# Patient Record
Sex: MALE | Race: WHITE | NOT HISPANIC OR LATINO | Employment: OTHER | URBAN - METROPOLITAN AREA
[De-identification: names, ages, dates, MRNs, and addresses within clinical notes are randomized per-mention and may not be internally consistent; named-entity substitution may affect disease eponyms.]

---

## 2017-03-28 LAB — HBA1C MFR BLD HPLC: 6.2 %

## 2017-04-17 DIAGNOSIS — R31.0 GROSS HEMATURIA: ICD-10-CM

## 2017-04-17 RX ORDER — ACETAMINOPHEN 325 MG/1
650 TABLET ORAL EVERY 6 HOURS PRN
Status: CANCELLED | OUTPATIENT
Start: 2017-04-17

## 2017-04-17 RX ORDER — ONDANSETRON 2 MG/ML
4 INJECTION INTRAMUSCULAR; INTRAVENOUS EVERY 6 HOURS PRN
Status: CANCELLED | OUTPATIENT
Start: 2017-04-17

## 2017-04-17 RX ORDER — SIMETHICONE 80 MG
80 TABLET,CHEWABLE ORAL 4 TIMES DAILY PRN
Status: CANCELLED | OUTPATIENT
Start: 2017-04-17

## 2017-04-17 RX ORDER — OXYCODONE HYDROCHLORIDE 10 MG/1
10 TABLET ORAL EVERY 4 HOURS PRN
Status: CANCELLED | OUTPATIENT
Start: 2017-04-17

## 2017-04-17 RX ORDER — DOCUSATE SODIUM 100 MG/1
100 CAPSULE, LIQUID FILLED ORAL 2 TIMES DAILY
Status: CANCELLED | OUTPATIENT
Start: 2017-04-17

## 2017-04-17 RX ORDER — TRAMADOL HYDROCHLORIDE 50 MG/1
50 TABLET ORAL EVERY 4 HOURS PRN
Status: CANCELLED | OUTPATIENT
Start: 2017-04-17

## 2017-04-17 RX ORDER — ACETAMINOPHEN 325 MG/1
650 TABLET ORAL EVERY 4 HOURS PRN
Status: CANCELLED | OUTPATIENT
Start: 2017-04-17

## 2017-04-17 RX ORDER — SODIUM CHLORIDE, SODIUM LACTATE, POTASSIUM CHLORIDE, CALCIUM CHLORIDE 600; 310; 30; 20 MG/100ML; MG/100ML; MG/100ML; MG/100ML
100 INJECTION, SOLUTION INTRAVENOUS CONTINUOUS
Status: CANCELLED | OUTPATIENT
Start: 2017-04-17

## 2017-04-17 RX ORDER — OXYCODONE HYDROCHLORIDE 5 MG/1
5 TABLET ORAL EVERY 4 HOURS PRN
Status: CANCELLED | OUTPATIENT
Start: 2017-04-17

## 2017-04-17 RX ORDER — METHOCARBAMOL 500 MG/1
500 TABLET, FILM COATED ORAL EVERY 6 HOURS PRN
Status: CANCELLED | OUTPATIENT
Start: 2017-04-17

## 2017-04-17 RX ORDER — PANTOPRAZOLE SODIUM 40 MG/1
40 TABLET, DELAYED RELEASE ORAL DAILY
Status: CANCELLED | OUTPATIENT
Start: 2017-04-18

## 2017-04-17 RX ORDER — SENNOSIDES 8.6 MG
1 TABLET ORAL DAILY
Status: CANCELLED | OUTPATIENT
Start: 2017-04-18

## 2017-04-17 RX ORDER — CALCIUM CARBONATE 200(500)MG
1000 TABLET,CHEWABLE ORAL DAILY PRN
Status: CANCELLED | OUTPATIENT
Start: 2017-04-17

## 2017-04-18 RX ORDER — LISINOPRIL 10 MG/1
10 TABLET ORAL DAILY
COMMUNITY
End: 2019-02-23 | Stop reason: HOSPADM

## 2017-04-18 RX ORDER — FUROSEMIDE 40 MG/1
40 TABLET ORAL DAILY
Status: ON HOLD | COMMUNITY
End: 2019-02-12

## 2017-04-18 RX ORDER — INSULIN ASPART 100 [IU]/ML
24 INJECTION, SUSPENSION SUBCUTANEOUS EVERY MORNING
Status: ON HOLD | COMMUNITY
End: 2018-11-09

## 2017-04-18 RX ORDER — INSULIN ASPART 100 [IU]/ML
16 INJECTION, SUSPENSION SUBCUTANEOUS EVERY EVENING
COMMUNITY
End: 2018-11-09 | Stop reason: HOSPADM

## 2017-04-18 RX ORDER — ATORVASTATIN CALCIUM 40 MG/1
40 TABLET, FILM COATED ORAL EVERY EVENING
COMMUNITY

## 2017-04-18 RX ORDER — METOPROLOL SUCCINATE 50 MG/1
50 TABLET, EXTENDED RELEASE ORAL DAILY
COMMUNITY
End: 2018-11-09 | Stop reason: HOSPADM

## 2017-04-18 RX ORDER — POTASSIUM CHLORIDE 20 MEQ/1
TABLET, EXTENDED RELEASE ORAL DAILY
COMMUNITY

## 2017-04-20 ENCOUNTER — ANESTHESIA EVENT (INPATIENT)
Dept: PERIOP | Facility: HOSPITAL | Age: 67
DRG: 464 | End: 2017-04-20
Payer: MEDICARE

## 2017-04-21 ENCOUNTER — HOSPITAL ENCOUNTER (INPATIENT)
Facility: HOSPITAL | Age: 67
LOS: 4 days | Discharge: HOME HEALTH | DRG: 464 | End: 2017-04-25
Attending: ORTHOPAEDIC SURGERY | Admitting: ORTHOPAEDIC SURGERY
Payer: MEDICARE

## 2017-04-21 ENCOUNTER — APPOINTMENT (INPATIENT)
Dept: RADIOLOGY | Facility: HOSPITAL | Age: 67
DRG: 464 | End: 2017-04-21
Payer: MEDICARE

## 2017-04-21 ENCOUNTER — ANESTHESIA (INPATIENT)
Dept: PERIOP | Facility: HOSPITAL | Age: 67
DRG: 464 | End: 2017-04-21
Payer: MEDICARE

## 2017-04-21 DIAGNOSIS — T84.012A FAILED TOTAL RIGHT KNEE REPLACEMENT, INITIAL ENCOUNTER (HCC): Primary | ICD-10-CM

## 2017-04-21 DIAGNOSIS — R31.9 HEMATURIA: ICD-10-CM

## 2017-04-21 LAB
ABO GROUP BLD: NORMAL
BLD GP AB SCN SERPL QL: NEGATIVE
GLUCOSE SERPL-MCNC: 162 MG/DL (ref 65–140)
INR PPP: 1.07 (ref 0.86–1.16)
PROTHROMBIN TIME: 13.9 SECONDS (ref 12–14.3)
RH BLD: POSITIVE
SPECIMEN EXPIRATION DATE: NORMAL

## 2017-04-21 PROCEDURE — 86850 RBC ANTIBODY SCREEN: CPT | Performed by: ORTHOPAEDIC SURGERY

## 2017-04-21 PROCEDURE — 82948 REAGENT STRIP/BLOOD GLUCOSE: CPT

## 2017-04-21 PROCEDURE — C1776 JOINT DEVICE (IMPLANTABLE): HCPCS | Performed by: ORTHOPAEDIC SURGERY

## 2017-04-21 PROCEDURE — 0SPC0JC REMOVAL OF SYNTHETIC SUBSTITUTE FROM RIGHT KNEE JOINT, PATELLAR SURFACE, OPEN APPROACH: ICD-10-PCS | Performed by: ORTHOPAEDIC SURGERY

## 2017-04-21 PROCEDURE — 0SPC09Z REMOVAL OF LINER FROM RIGHT KNEE JOINT, OPEN APPROACH: ICD-10-PCS | Performed by: ORTHOPAEDIC SURGERY

## 2017-04-21 PROCEDURE — 86900 BLOOD TYPING SEROLOGIC ABO: CPT | Performed by: ORTHOPAEDIC SURGERY

## 2017-04-21 PROCEDURE — C1713 ANCHOR/SCREW BN/BN,TIS/BN: HCPCS | Performed by: ORTHOPAEDIC SURGERY

## 2017-04-21 PROCEDURE — 73560 X-RAY EXAM OF KNEE 1 OR 2: CPT

## 2017-04-21 PROCEDURE — 0SUC09C SUPPLEMENT RIGHT KNEE JOINT WITH LINER, PATELLAR SURFACE, OPEN APPROACH: ICD-10-PCS | Performed by: ORTHOPAEDIC SURGERY

## 2017-04-21 PROCEDURE — 86901 BLOOD TYPING SEROLOGIC RH(D): CPT | Performed by: ORTHOPAEDIC SURGERY

## 2017-04-21 PROCEDURE — 85610 PROTHROMBIN TIME: CPT | Performed by: ORTHOPAEDIC SURGERY

## 2017-04-21 DEVICE — IMPLANTABLE DEVICE: Type: IMPLANTABLE DEVICE | Site: PATELLA | Status: FUNCTIONAL

## 2017-04-21 RX ORDER — OXYCODONE HYDROCHLORIDE 10 MG/1
10 TABLET ORAL EVERY 4 HOURS PRN
Status: DISCONTINUED | OUTPATIENT
Start: 2017-04-22 | End: 2017-04-25 | Stop reason: HOSPADM

## 2017-04-21 RX ORDER — OXYCODONE HYDROCHLORIDE 5 MG/1
5 TABLET ORAL EVERY 4 HOURS PRN
Status: DISCONTINUED | OUTPATIENT
Start: 2017-04-22 | End: 2017-04-25 | Stop reason: HOSPADM

## 2017-04-21 RX ORDER — MAGNESIUM HYDROXIDE 1200 MG/15ML
LIQUID ORAL AS NEEDED
Status: DISCONTINUED | OUTPATIENT
Start: 2017-04-21 | End: 2017-04-21 | Stop reason: HOSPADM

## 2017-04-21 RX ORDER — MIDAZOLAM HYDROCHLORIDE 1 MG/ML
INJECTION INTRAMUSCULAR; INTRAVENOUS AS NEEDED
Status: DISCONTINUED | OUTPATIENT
Start: 2017-04-21 | End: 2017-04-21 | Stop reason: SURG

## 2017-04-21 RX ORDER — NALBUPHINE HCL 10 MG/ML
2 AMPUL (ML) INJECTION
Status: ACTIVE | OUTPATIENT
Start: 2017-04-21 | End: 2017-04-22

## 2017-04-21 RX ORDER — MORPHINE SULFATE 0.5 MG/ML
INJECTION, SOLUTION EPIDURAL; INTRATHECAL; INTRAVENOUS AS NEEDED
Status: DISCONTINUED | OUTPATIENT
Start: 2017-04-21 | End: 2017-04-21 | Stop reason: SURG

## 2017-04-21 RX ORDER — FUROSEMIDE 40 MG/1
40 TABLET ORAL DAILY
Status: DISCONTINUED | OUTPATIENT
Start: 2017-04-22 | End: 2017-04-25 | Stop reason: HOSPADM

## 2017-04-21 RX ORDER — INSULIN ASPART 100 [IU]/ML
16 INJECTION, SUSPENSION SUBCUTANEOUS
Status: DISCONTINUED | OUTPATIENT
Start: 2017-04-22 | End: 2017-04-25 | Stop reason: HOSPADM

## 2017-04-21 RX ORDER — SODIUM CHLORIDE 9 MG/ML
125 INJECTION, SOLUTION INTRAVENOUS CONTINUOUS
Status: DISCONTINUED | OUTPATIENT
Start: 2017-04-21 | End: 2017-04-25 | Stop reason: HOSPADM

## 2017-04-21 RX ORDER — SENNOSIDES 8.6 MG
1 TABLET ORAL DAILY
Status: DISCONTINUED | OUTPATIENT
Start: 2017-04-22 | End: 2017-04-25 | Stop reason: HOSPADM

## 2017-04-21 RX ORDER — FENTANYL CITRATE 50 UG/ML
INJECTION, SOLUTION INTRAMUSCULAR; INTRAVENOUS AS NEEDED
Status: DISCONTINUED | OUTPATIENT
Start: 2017-04-21 | End: 2017-04-21 | Stop reason: SURG

## 2017-04-21 RX ORDER — ONDANSETRON 4 MG/1
4 TABLET, ORALLY DISINTEGRATING ORAL EVERY 8 HOURS SCHEDULED
Status: DISCONTINUED | OUTPATIENT
Start: 2017-04-21 | End: 2017-04-25 | Stop reason: HOSPADM

## 2017-04-21 RX ORDER — METHOCARBAMOL 500 MG/1
500 TABLET, FILM COATED ORAL EVERY 6 HOURS PRN
Status: DISCONTINUED | OUTPATIENT
Start: 2017-04-21 | End: 2017-04-25 | Stop reason: HOSPADM

## 2017-04-21 RX ORDER — ONDANSETRON 2 MG/ML
4 INJECTION INTRAMUSCULAR; INTRAVENOUS EVERY 4 HOURS PRN
Status: DISCONTINUED | OUTPATIENT
Start: 2017-04-21 | End: 2017-04-22

## 2017-04-21 RX ORDER — ONDANSETRON 2 MG/ML
4 INJECTION INTRAMUSCULAR; INTRAVENOUS EVERY 6 HOURS PRN
Status: DISCONTINUED | OUTPATIENT
Start: 2017-04-22 | End: 2017-04-25 | Stop reason: HOSPADM

## 2017-04-21 RX ORDER — METOCLOPRAMIDE HYDROCHLORIDE 5 MG/ML
5 INJECTION INTRAMUSCULAR; INTRAVENOUS EVERY 6 HOURS PRN
Status: DISCONTINUED | OUTPATIENT
Start: 2017-04-21 | End: 2017-04-22

## 2017-04-21 RX ORDER — INSULIN ASPART 100 [IU]/ML
24 INJECTION, SUSPENSION SUBCUTANEOUS
Status: DISCONTINUED | OUTPATIENT
Start: 2017-04-22 | End: 2017-04-25 | Stop reason: HOSPADM

## 2017-04-21 RX ORDER — METOPROLOL SUCCINATE 50 MG/1
50 TABLET, EXTENDED RELEASE ORAL DAILY
Status: DISCONTINUED | OUTPATIENT
Start: 2017-04-22 | End: 2017-04-25 | Stop reason: HOSPADM

## 2017-04-21 RX ORDER — LISINOPRIL 10 MG/1
10 TABLET ORAL DAILY
Status: DISCONTINUED | OUTPATIENT
Start: 2017-04-22 | End: 2017-04-25 | Stop reason: HOSPADM

## 2017-04-21 RX ORDER — ACETAMINOPHEN 325 MG/1
650 TABLET ORAL EVERY 6 HOURS PRN
Status: DISCONTINUED | OUTPATIENT
Start: 2017-04-21 | End: 2017-04-25 | Stop reason: HOSPADM

## 2017-04-21 RX ORDER — PANTOPRAZOLE SODIUM 40 MG/1
40 TABLET, DELAYED RELEASE ORAL
Status: DISCONTINUED | OUTPATIENT
Start: 2017-04-22 | End: 2017-04-25 | Stop reason: HOSPADM

## 2017-04-21 RX ORDER — POTASSIUM CHLORIDE 20 MEQ/1
20 TABLET, EXTENDED RELEASE ORAL DAILY
Status: DISCONTINUED | OUTPATIENT
Start: 2017-04-22 | End: 2017-04-25 | Stop reason: HOSPADM

## 2017-04-21 RX ORDER — ONDANSETRON 2 MG/ML
4 INJECTION INTRAMUSCULAR; INTRAVENOUS EVERY 6 HOURS PRN
Status: DISCONTINUED | OUTPATIENT
Start: 2017-04-21 | End: 2017-04-21 | Stop reason: HOSPADM

## 2017-04-21 RX ORDER — BRIMONIDINE TARTRATE 0.15 %
1 DROPS OPHTHALMIC (EYE) 3 TIMES DAILY
Status: DISCONTINUED | OUTPATIENT
Start: 2017-04-21 | End: 2017-04-25 | Stop reason: HOSPADM

## 2017-04-21 RX ORDER — PROPOFOL 10 MG/ML
INJECTION, EMULSION INTRAVENOUS CONTINUOUS PRN
Status: DISCONTINUED | OUTPATIENT
Start: 2017-04-21 | End: 2017-04-21 | Stop reason: SURG

## 2017-04-21 RX ORDER — CALCIUM CARBONATE 200(500)MG
1000 TABLET,CHEWABLE ORAL DAILY PRN
Status: DISCONTINUED | OUTPATIENT
Start: 2017-04-21 | End: 2017-04-25 | Stop reason: HOSPADM

## 2017-04-21 RX ORDER — DOCUSATE SODIUM 100 MG/1
100 CAPSULE, LIQUID FILLED ORAL 2 TIMES DAILY
Status: DISCONTINUED | OUTPATIENT
Start: 2017-04-21 | End: 2017-04-25 | Stop reason: HOSPADM

## 2017-04-21 RX ORDER — SODIUM CHLORIDE, SODIUM LACTATE, POTASSIUM CHLORIDE, CALCIUM CHLORIDE 600; 310; 30; 20 MG/100ML; MG/100ML; MG/100ML; MG/100ML
100 INJECTION, SOLUTION INTRAVENOUS CONTINUOUS
Status: DISCONTINUED | OUTPATIENT
Start: 2017-04-21 | End: 2017-04-25 | Stop reason: HOSPADM

## 2017-04-21 RX ORDER — SIMETHICONE 80 MG
80 TABLET,CHEWABLE ORAL 4 TIMES DAILY PRN
Status: DISCONTINUED | OUTPATIENT
Start: 2017-04-21 | End: 2017-04-25 | Stop reason: HOSPADM

## 2017-04-21 RX ORDER — ACETAMINOPHEN 325 MG/1
650 TABLET ORAL EVERY 4 HOURS PRN
Status: DISCONTINUED | OUTPATIENT
Start: 2017-04-21 | End: 2017-04-25 | Stop reason: HOSPADM

## 2017-04-21 RX ORDER — MELATONIN
3000 DAILY
Status: DISCONTINUED | OUTPATIENT
Start: 2017-04-22 | End: 2017-04-25 | Stop reason: HOSPADM

## 2017-04-21 RX ORDER — OXYCODONE HYDROCHLORIDE AND ACETAMINOPHEN 5; 325 MG/1; MG/1
2 TABLET ORAL EVERY 6 HOURS PRN
Status: DISCONTINUED | OUTPATIENT
Start: 2017-04-22 | End: 2017-04-25 | Stop reason: HOSPADM

## 2017-04-21 RX ORDER — ATORVASTATIN CALCIUM 40 MG/1
40 TABLET, FILM COATED ORAL EVERY EVENING
Status: DISCONTINUED | OUTPATIENT
Start: 2017-04-21 | End: 2017-04-25 | Stop reason: HOSPADM

## 2017-04-21 RX ORDER — ZOLPIDEM TARTRATE 5 MG/1
5 TABLET ORAL
Status: DISCONTINUED | OUTPATIENT
Start: 2017-04-22 | End: 2017-04-25 | Stop reason: HOSPADM

## 2017-04-21 RX ORDER — ASPIRIN 81 MG/1
81 TABLET, CHEWABLE ORAL
Status: DISCONTINUED | OUTPATIENT
Start: 2017-04-21 | End: 2017-04-25 | Stop reason: HOSPADM

## 2017-04-21 RX ORDER — TRANEXAMIC ACID 100 MG/ML
INJECTION, SOLUTION INTRAVENOUS AS NEEDED
Status: DISCONTINUED | OUTPATIENT
Start: 2017-04-21 | End: 2017-04-21 | Stop reason: HOSPADM

## 2017-04-21 RX ORDER — ONDANSETRON 2 MG/ML
INJECTION INTRAMUSCULAR; INTRAVENOUS AS NEEDED
Status: DISCONTINUED | OUTPATIENT
Start: 2017-04-21 | End: 2017-04-21 | Stop reason: SURG

## 2017-04-21 RX ORDER — ALLOPURINOL 300 MG/1
300 TABLET ORAL DAILY
Status: DISCONTINUED | OUTPATIENT
Start: 2017-04-22 | End: 2017-04-25 | Stop reason: HOSPADM

## 2017-04-21 RX ORDER — FOLIC ACID 1 MG/1
1 TABLET ORAL DAILY
Status: DISCONTINUED | OUTPATIENT
Start: 2017-04-22 | End: 2017-04-25 | Stop reason: HOSPADM

## 2017-04-21 RX ORDER — ROPIVACAINE HYDROCHLORIDE 5 MG/ML
INJECTION, SOLUTION EPIDURAL; INFILTRATION; PERINEURAL AS NEEDED
Status: DISCONTINUED | OUTPATIENT
Start: 2017-04-21 | End: 2017-04-21 | Stop reason: SURG

## 2017-04-21 RX ORDER — DIPHENHYDRAMINE HYDROCHLORIDE 50 MG/ML
25 INJECTION INTRAMUSCULAR; INTRAVENOUS EVERY 6 HOURS PRN
Status: DISCONTINUED | OUTPATIENT
Start: 2017-04-21 | End: 2017-04-22

## 2017-04-21 RX ORDER — BUPIVACAINE HYDROCHLORIDE 7.5 MG/ML
INJECTION, SOLUTION INTRASPINAL AS NEEDED
Status: DISCONTINUED | OUTPATIENT
Start: 2017-04-21 | End: 2017-04-21 | Stop reason: SURG

## 2017-04-21 RX ORDER — TRAMADOL HYDROCHLORIDE 50 MG/1
50 TABLET ORAL EVERY 4 HOURS PRN
Status: DISCONTINUED | OUTPATIENT
Start: 2017-04-22 | End: 2017-04-25 | Stop reason: HOSPADM

## 2017-04-21 RX ADMIN — DIPHENHYDRAMINE HYDROCHLORIDE 25 MG: 50 INJECTION, SOLUTION INTRAMUSCULAR; INTRAVENOUS at 19:08

## 2017-04-21 RX ADMIN — SODIUM CHLORIDE, SODIUM LACTATE, POTASSIUM CHLORIDE, AND CALCIUM CHLORIDE 100 ML/HR: .6; .31; .03; .02 INJECTION, SOLUTION INTRAVENOUS at 16:16

## 2017-04-21 RX ADMIN — ROPIVACAINE HYDROCHLORIDE 20 ML: 5 INJECTION, SOLUTION EPIDURAL; INFILTRATION; PERINEURAL at 12:24

## 2017-04-21 RX ADMIN — DOCUSATE SODIUM 100 MG: 100 CAPSULE, LIQUID FILLED ORAL at 18:52

## 2017-04-21 RX ADMIN — ATORVASTATIN CALCIUM 40 MG: 40 TABLET, FILM COATED ORAL at 22:25

## 2017-04-21 RX ADMIN — ASPIRIN 81 MG: 81 TABLET, CHEWABLE ORAL at 22:25

## 2017-04-21 RX ADMIN — BUPIVACAINE HYDROCHLORIDE IN DEXTROSE 2 ML: 7.5 INJECTION, SOLUTION SUBARACHNOID at 12:52

## 2017-04-21 RX ADMIN — BRIMONIDINE TARTRATE 1 DROP: 1.5 SOLUTION/ DROPS OPHTHALMIC at 22:26

## 2017-04-21 RX ADMIN — VANCOMYCIN HYDROCHLORIDE 2000 MG: 1 INJECTION, POWDER, LYOPHILIZED, FOR SOLUTION INTRAVENOUS at 13:01

## 2017-04-21 RX ADMIN — PROPOFOL 100 MCG/KG/MIN: 10 INJECTION, EMULSION INTRAVENOUS at 12:41

## 2017-04-21 RX ADMIN — Medication 3000 MG: at 12:41

## 2017-04-21 RX ADMIN — SODIUM CHLORIDE 125 ML/HR: 0.9 INJECTION, SOLUTION INTRAVENOUS at 11:37

## 2017-04-21 RX ADMIN — MORPHINE SULFATE 0.2 MG: 0.5 INJECTION, SOLUTION EPIDURAL; INTRATHECAL; INTRAVENOUS at 12:52

## 2017-04-21 RX ADMIN — SODIUM CHLORIDE: 0.9 INJECTION, SOLUTION INTRAVENOUS at 13:11

## 2017-04-21 RX ADMIN — ONDANSETRON HYDROCHLORIDE 4 MG: 2 INJECTION, SOLUTION INTRAVENOUS at 14:43

## 2017-04-21 RX ADMIN — CEFAZOLIN SODIUM 2000 MG: 10 INJECTION, POWDER, FOR SOLUTION INTRAVENOUS at 22:30

## 2017-04-21 RX ADMIN — SODIUM CHLORIDE: 0.9 INJECTION, SOLUTION INTRAVENOUS at 14:24

## 2017-04-21 RX ADMIN — BIMATOPROST 1 DROP: 0.1 SOLUTION/ DROPS OPHTHALMIC at 22:26

## 2017-04-21 RX ADMIN — MIDAZOLAM HYDROCHLORIDE 4 MG: 1 INJECTION, SOLUTION INTRAMUSCULAR; INTRAVENOUS at 12:21

## 2017-04-21 RX ADMIN — FENTANYL CITRATE 100 MCG: 50 INJECTION, SOLUTION INTRAMUSCULAR; INTRAVENOUS at 12:21

## 2017-04-22 LAB
ANION GAP SERPL CALCULATED.3IONS-SCNC: 7 MMOL/L (ref 4–13)
BACTERIA UR QL AUTO: ABNORMAL /HPF
BILIRUB UR QL STRIP: NEGATIVE
BUN SERPL-MCNC: 11 MG/DL (ref 5–25)
CALCIUM SERPL-MCNC: 8.4 MG/DL (ref 8.3–10.1)
CHLORIDE SERPL-SCNC: 108 MMOL/L (ref 100–108)
CLARITY UR: CLEAR
CO2 SERPL-SCNC: 28 MMOL/L (ref 21–32)
COLOR UR: YELLOW
CREAT SERPL-MCNC: 0.69 MG/DL (ref 0.6–1.3)
EST. AVERAGE GLUCOSE BLD GHB EST-MCNC: 134 MG/DL
GFR SERPL CREATININE-BSD FRML MDRD: >60 ML/MIN/1.73SQ M
GLUCOSE SERPL-MCNC: 105 MG/DL (ref 65–140)
GLUCOSE SERPL-MCNC: 114 MG/DL (ref 65–140)
GLUCOSE SERPL-MCNC: 119 MG/DL (ref 65–140)
GLUCOSE SERPL-MCNC: 122 MG/DL (ref 65–140)
GLUCOSE SERPL-MCNC: 124 MG/DL (ref 65–140)
GLUCOSE SERPL-MCNC: 186 MG/DL (ref 65–140)
GLUCOSE UR STRIP-MCNC: NEGATIVE MG/DL
HBA1C MFR BLD: 6.3 % (ref 4.2–6.3)
HCT VFR BLD AUTO: 33.4 % (ref 36.5–49.3)
HGB BLD-MCNC: 10.9 G/DL (ref 12–17)
HGB UR QL STRIP.AUTO: ABNORMAL
KETONES UR STRIP-MCNC: NEGATIVE MG/DL
LEUKOCYTE ESTERASE UR QL STRIP: ABNORMAL
NITRITE UR QL STRIP: NEGATIVE
NON-SQ EPI CELLS URNS QL MICRO: ABNORMAL /HPF
PH UR STRIP.AUTO: 5.5 [PH] (ref 4.5–8)
POTASSIUM SERPL-SCNC: 3.5 MMOL/L (ref 3.5–5.3)
PROT UR STRIP-MCNC: NEGATIVE MG/DL
RBC #/AREA URNS AUTO: ABNORMAL /HPF
SODIUM SERPL-SCNC: 143 MMOL/L (ref 136–145)
SP GR UR STRIP.AUTO: 1.01 (ref 1–1.03)
UROBILINOGEN UR QL STRIP.AUTO: 0.2 E.U./DL
WBC #/AREA URNS AUTO: ABNORMAL /HPF

## 2017-04-22 PROCEDURE — 97166 OT EVAL MOD COMPLEX 45 MIN: CPT

## 2017-04-22 PROCEDURE — G8978 MOBILITY CURRENT STATUS: HCPCS

## 2017-04-22 PROCEDURE — 97116 GAIT TRAINING THERAPY: CPT

## 2017-04-22 PROCEDURE — 85018 HEMOGLOBIN: CPT | Performed by: PHYSICIAN ASSISTANT

## 2017-04-22 PROCEDURE — 82948 REAGENT STRIP/BLOOD GLUCOSE: CPT

## 2017-04-22 PROCEDURE — 87086 URINE CULTURE/COLONY COUNT: CPT | Performed by: INTERNAL MEDICINE

## 2017-04-22 PROCEDURE — 80048 BASIC METABOLIC PNL TOTAL CA: CPT | Performed by: PHYSICIAN ASSISTANT

## 2017-04-22 PROCEDURE — 97163 PT EVAL HIGH COMPLEX 45 MIN: CPT

## 2017-04-22 PROCEDURE — 85014 HEMATOCRIT: CPT | Performed by: PHYSICIAN ASSISTANT

## 2017-04-22 PROCEDURE — G8987 SELF CARE CURRENT STATUS: HCPCS

## 2017-04-22 PROCEDURE — 83036 HEMOGLOBIN GLYCOSYLATED A1C: CPT | Performed by: INTERNAL MEDICINE

## 2017-04-22 PROCEDURE — 97110 THERAPEUTIC EXERCISES: CPT

## 2017-04-22 PROCEDURE — G8988 SELF CARE GOAL STATUS: HCPCS

## 2017-04-22 PROCEDURE — 81001 URINALYSIS AUTO W/SCOPE: CPT | Performed by: INTERNAL MEDICINE

## 2017-04-22 PROCEDURE — G8979 MOBILITY GOAL STATUS: HCPCS

## 2017-04-22 RX ORDER — POTASSIUM CHLORIDE 20 MEQ/1
20 TABLET, EXTENDED RELEASE ORAL DAILY
Status: DISCONTINUED | OUTPATIENT
Start: 2017-04-22 | End: 2017-04-25 | Stop reason: SDUPTHER

## 2017-04-22 RX ORDER — TAMSULOSIN HYDROCHLORIDE 0.4 MG/1
0.4 CAPSULE ORAL
Status: DISCONTINUED | OUTPATIENT
Start: 2017-04-22 | End: 2017-04-25 | Stop reason: HOSPADM

## 2017-04-22 RX ADMIN — DOCUSATE SODIUM 100 MG: 100 CAPSULE, LIQUID FILLED ORAL at 08:12

## 2017-04-22 RX ADMIN — BRIMONIDINE TARTRATE 1 DROP: 1.5 SOLUTION/ DROPS OPHTHALMIC at 16:09

## 2017-04-22 RX ADMIN — FOLIC ACID 1 MG: 1 TABLET ORAL at 08:14

## 2017-04-22 RX ADMIN — SENNOSIDES 8.6 MG: 8.6 TABLET, FILM COATED ORAL at 08:15

## 2017-04-22 RX ADMIN — DIPHENHYDRAMINE HYDROCHLORIDE 25 MG: 50 INJECTION, SOLUTION INTRAMUSCULAR; INTRAVENOUS at 08:07

## 2017-04-22 RX ADMIN — FUROSEMIDE 40 MG: 40 TABLET ORAL at 08:13

## 2017-04-22 RX ADMIN — METOPROLOL SUCCINATE 50 MG: 50 TABLET, EXTENDED RELEASE ORAL at 08:13

## 2017-04-22 RX ADMIN — TAMSULOSIN HYDROCHLORIDE 0.4 MG: 0.4 CAPSULE ORAL at 16:09

## 2017-04-22 RX ADMIN — RIVAROXABAN 10 MG: 20 TABLET, FILM COATED ORAL at 05:57

## 2017-04-22 RX ADMIN — PANTOPRAZOLE SODIUM 40 MG: 40 TABLET, DELAYED RELEASE ORAL at 06:01

## 2017-04-22 RX ADMIN — ALLOPURINOL 300 MG: 300 TABLET ORAL at 08:15

## 2017-04-22 RX ADMIN — BRIMONIDINE TARTRATE 1 DROP: 1.5 SOLUTION/ DROPS OPHTHALMIC at 21:44

## 2017-04-22 RX ADMIN — Medication 400 MG: at 08:15

## 2017-04-22 RX ADMIN — ASPIRIN 81 MG: 81 TABLET, CHEWABLE ORAL at 17:37

## 2017-04-22 RX ADMIN — OXYCODONE HYDROCHLORIDE AND ACETAMINOPHEN 2 TABLET: 5; 325 TABLET ORAL at 19:35

## 2017-04-22 RX ADMIN — BRIMONIDINE TARTRATE 1 DROP: 1.5 SOLUTION/ DROPS OPHTHALMIC at 08:17

## 2017-04-22 RX ADMIN — CEFAZOLIN SODIUM 2000 MG: 10 INJECTION, POWDER, FOR SOLUTION INTRAVENOUS at 05:45

## 2017-04-22 RX ADMIN — SODIUM CHLORIDE, SODIUM LACTATE, POTASSIUM CHLORIDE, AND CALCIUM CHLORIDE 100 ML/HR: .6; .31; .03; .02 INJECTION, SOLUTION INTRAVENOUS at 02:19

## 2017-04-22 RX ADMIN — INSULIN ASPART 24 UNITS: 100 INJECTION, SUSPENSION SUBCUTANEOUS at 07:48

## 2017-04-22 RX ADMIN — ATORVASTATIN CALCIUM 40 MG: 40 TABLET, FILM COATED ORAL at 17:37

## 2017-04-22 RX ADMIN — METFORMIN HYDROCHLORIDE 1000 MG: 500 TABLET, FILM COATED ORAL at 07:48

## 2017-04-22 RX ADMIN — LISINOPRIL 10 MG: 10 TABLET ORAL at 08:16

## 2017-04-22 RX ADMIN — POTASSIUM CHLORIDE 20 MEQ: 1500 TABLET, EXTENDED RELEASE ORAL at 14:34

## 2017-04-22 RX ADMIN — BIMATOPROST 1 DROP: 0.1 SOLUTION/ DROPS OPHTHALMIC at 21:22

## 2017-04-22 RX ADMIN — DIPHENHYDRAMINE HYDROCHLORIDE 25 MG: 50 INJECTION, SOLUTION INTRAMUSCULAR; INTRAVENOUS at 01:13

## 2017-04-22 RX ADMIN — INSULIN LISPRO 1 UNITS: 100 INJECTION, SOLUTION INTRAVENOUS; SUBCUTANEOUS at 16:13

## 2017-04-22 RX ADMIN — CHOLECALCIFEROL TAB 25 MCG (1000 UNIT) 3000 UNITS: 25 TAB at 08:16

## 2017-04-22 RX ADMIN — METFORMIN HYDROCHLORIDE 1000 MG: 500 TABLET, FILM COATED ORAL at 16:10

## 2017-04-22 RX ADMIN — POTASSIUM CHLORIDE 20 MEQ: 1500 TABLET, EXTENDED RELEASE ORAL at 08:15

## 2017-04-22 RX ADMIN — INSULIN ASPART 16 UNITS: 100 INJECTION, SUSPENSION SUBCUTANEOUS at 16:09

## 2017-04-22 RX ADMIN — DOCUSATE SODIUM 100 MG: 100 CAPSULE, LIQUID FILLED ORAL at 17:35

## 2017-04-23 LAB
BACTERIA UR CULT: NORMAL
GLUCOSE SERPL-MCNC: 106 MG/DL (ref 65–140)
GLUCOSE SERPL-MCNC: 122 MG/DL (ref 65–140)
GLUCOSE SERPL-MCNC: 125 MG/DL (ref 65–140)
GLUCOSE SERPL-MCNC: 160 MG/DL (ref 65–140)
GLUCOSE SERPL-MCNC: 192 MG/DL (ref 65–140)
HCT VFR BLD AUTO: 34.6 % (ref 36.5–49.3)
HGB BLD-MCNC: 11.3 G/DL (ref 12–17)

## 2017-04-23 PROCEDURE — 85018 HEMOGLOBIN: CPT | Performed by: PHYSICIAN ASSISTANT

## 2017-04-23 PROCEDURE — 85014 HEMATOCRIT: CPT | Performed by: PHYSICIAN ASSISTANT

## 2017-04-23 PROCEDURE — 97530 THERAPEUTIC ACTIVITIES: CPT

## 2017-04-23 PROCEDURE — 82948 REAGENT STRIP/BLOOD GLUCOSE: CPT

## 2017-04-23 PROCEDURE — 97116 GAIT TRAINING THERAPY: CPT

## 2017-04-23 RX ORDER — TAMSULOSIN HYDROCHLORIDE 0.4 MG/1
0.4 CAPSULE ORAL
Qty: 30 CAPSULE | Refills: 0 | Status: SHIPPED | OUTPATIENT
Start: 2017-04-23 | End: 2018-03-01 | Stop reason: SDUPTHER

## 2017-04-23 RX ADMIN — OXYCODONE HYDROCHLORIDE AND ACETAMINOPHEN 2 TABLET: 5; 325 TABLET ORAL at 22:09

## 2017-04-23 RX ADMIN — ASPIRIN 81 MG: 81 TABLET, CHEWABLE ORAL at 17:58

## 2017-04-23 RX ADMIN — INSULIN ASPART 24 UNITS: 100 INJECTION, SUSPENSION SUBCUTANEOUS at 08:42

## 2017-04-23 RX ADMIN — CHOLECALCIFEROL TAB 25 MCG (1000 UNIT) 3000 UNITS: 25 TAB at 08:40

## 2017-04-23 RX ADMIN — LISINOPRIL 10 MG: 10 TABLET ORAL at 08:41

## 2017-04-23 RX ADMIN — POTASSIUM CHLORIDE 20 MEQ: 1500 TABLET, EXTENDED RELEASE ORAL at 08:41

## 2017-04-23 RX ADMIN — METFORMIN HYDROCHLORIDE 1000 MG: 500 TABLET, FILM COATED ORAL at 17:57

## 2017-04-23 RX ADMIN — ATORVASTATIN CALCIUM 40 MG: 40 TABLET, FILM COATED ORAL at 17:58

## 2017-04-23 RX ADMIN — FOLIC ACID 1 MG: 1 TABLET ORAL at 08:40

## 2017-04-23 RX ADMIN — BRIMONIDINE TARTRATE 1 DROP: 1.5 SOLUTION/ DROPS OPHTHALMIC at 08:43

## 2017-04-23 RX ADMIN — BIMATOPROST 1 DROP: 0.1 SOLUTION/ DROPS OPHTHALMIC at 22:09

## 2017-04-23 RX ADMIN — TAMSULOSIN HYDROCHLORIDE 0.4 MG: 0.4 CAPSULE ORAL at 17:57

## 2017-04-23 RX ADMIN — ALLOPURINOL 300 MG: 300 TABLET ORAL at 08:41

## 2017-04-23 RX ADMIN — DOCUSATE SODIUM 100 MG: 100 CAPSULE, LIQUID FILLED ORAL at 08:41

## 2017-04-23 RX ADMIN — FUROSEMIDE 40 MG: 40 TABLET ORAL at 11:10

## 2017-04-23 RX ADMIN — BRIMONIDINE TARTRATE 1 DROP: 1.5 SOLUTION/ DROPS OPHTHALMIC at 22:22

## 2017-04-23 RX ADMIN — OXYCODONE HYDROCHLORIDE AND ACETAMINOPHEN 2 TABLET: 5; 325 TABLET ORAL at 01:36

## 2017-04-23 RX ADMIN — DOCUSATE SODIUM 100 MG: 100 CAPSULE, LIQUID FILLED ORAL at 17:58

## 2017-04-23 RX ADMIN — PANTOPRAZOLE SODIUM 40 MG: 40 TABLET, DELAYED RELEASE ORAL at 06:15

## 2017-04-23 RX ADMIN — INSULIN ASPART 16 UNITS: 100 INJECTION, SUSPENSION SUBCUTANEOUS at 17:58

## 2017-04-23 RX ADMIN — SENNOSIDES 8.6 MG: 8.6 TABLET, FILM COATED ORAL at 08:40

## 2017-04-23 RX ADMIN — METFORMIN HYDROCHLORIDE 1000 MG: 500 TABLET, FILM COATED ORAL at 08:40

## 2017-04-23 RX ADMIN — METOPROLOL SUCCINATE 50 MG: 50 TABLET, EXTENDED RELEASE ORAL at 08:40

## 2017-04-23 RX ADMIN — RIVAROXABAN 10 MG: 20 TABLET, FILM COATED ORAL at 08:41

## 2017-04-23 RX ADMIN — OXYCODONE HYDROCHLORIDE AND ACETAMINOPHEN 2 TABLET: 5; 325 TABLET ORAL at 08:42

## 2017-04-23 RX ADMIN — Medication 400 MG: at 08:41

## 2017-04-23 RX ADMIN — BRIMONIDINE TARTRATE 1 DROP: 1.5 SOLUTION/ DROPS OPHTHALMIC at 16:15

## 2017-04-24 ENCOUNTER — APPOINTMENT (INPATIENT)
Dept: PHYSICAL THERAPY | Facility: HOSPITAL | Age: 67
DRG: 464 | End: 2017-04-24
Payer: MEDICARE

## 2017-04-24 LAB
GLUCOSE SERPL-MCNC: 101 MG/DL (ref 65–140)
GLUCOSE SERPL-MCNC: 122 MG/DL (ref 65–140)
GLUCOSE SERPL-MCNC: 129 MG/DL (ref 65–140)
GLUCOSE SERPL-MCNC: 159 MG/DL (ref 65–140)
HCT VFR BLD AUTO: 32.2 % (ref 36.5–49.3)
HGB BLD-MCNC: 10.7 G/DL (ref 12–17)

## 2017-04-24 PROCEDURE — 97116 GAIT TRAINING THERAPY: CPT

## 2017-04-24 PROCEDURE — 85014 HEMATOCRIT: CPT | Performed by: PHYSICIAN ASSISTANT

## 2017-04-24 PROCEDURE — 85018 HEMOGLOBIN: CPT | Performed by: PHYSICIAN ASSISTANT

## 2017-04-24 PROCEDURE — 82948 REAGENT STRIP/BLOOD GLUCOSE: CPT

## 2017-04-24 PROCEDURE — 97110 THERAPEUTIC EXERCISES: CPT

## 2017-04-24 PROCEDURE — 97530 THERAPEUTIC ACTIVITIES: CPT

## 2017-04-24 RX ADMIN — RIVAROXABAN 10 MG: 20 TABLET, FILM COATED ORAL at 08:17

## 2017-04-24 RX ADMIN — BRIMONIDINE TARTRATE 1 DROP: 1.5 SOLUTION/ DROPS OPHTHALMIC at 16:43

## 2017-04-24 RX ADMIN — SENNOSIDES 8.6 MG: 8.6 TABLET, FILM COATED ORAL at 08:26

## 2017-04-24 RX ADMIN — BRIMONIDINE TARTRATE 1 DROP: 1.5 SOLUTION/ DROPS OPHTHALMIC at 21:00

## 2017-04-24 RX ADMIN — Medication 400 MG: at 08:17

## 2017-04-24 RX ADMIN — DOCUSATE SODIUM 100 MG: 100 CAPSULE, LIQUID FILLED ORAL at 08:17

## 2017-04-24 RX ADMIN — DOCUSATE SODIUM 100 MG: 100 CAPSULE, LIQUID FILLED ORAL at 19:43

## 2017-04-24 RX ADMIN — INSULIN ASPART 24 UNITS: 100 INJECTION, SUSPENSION SUBCUTANEOUS at 08:29

## 2017-04-24 RX ADMIN — METOPROLOL SUCCINATE 50 MG: 50 TABLET, EXTENDED RELEASE ORAL at 08:18

## 2017-04-24 RX ADMIN — OXYCODONE HYDROCHLORIDE AND ACETAMINOPHEN 2 TABLET: 5; 325 TABLET ORAL at 21:13

## 2017-04-24 RX ADMIN — INSULIN ASPART 16 UNITS: 100 INJECTION, SUSPENSION SUBCUTANEOUS at 16:43

## 2017-04-24 RX ADMIN — INSULIN LISPRO 1 UNITS: 100 INJECTION, SOLUTION INTRAVENOUS; SUBCUTANEOUS at 12:23

## 2017-04-24 RX ADMIN — POTASSIUM CHLORIDE 20 MEQ: 1500 TABLET, EXTENDED RELEASE ORAL at 08:17

## 2017-04-24 RX ADMIN — FOLIC ACID 1 MG: 1 TABLET ORAL at 08:26

## 2017-04-24 RX ADMIN — BRIMONIDINE TARTRATE 1 DROP: 1.5 SOLUTION/ DROPS OPHTHALMIC at 08:29

## 2017-04-24 RX ADMIN — PANTOPRAZOLE SODIUM 40 MG: 40 TABLET, DELAYED RELEASE ORAL at 06:14

## 2017-04-24 RX ADMIN — ATORVASTATIN CALCIUM 40 MG: 40 TABLET, FILM COATED ORAL at 19:43

## 2017-04-24 RX ADMIN — BIMATOPROST 1 DROP: 0.1 SOLUTION/ DROPS OPHTHALMIC at 21:13

## 2017-04-24 RX ADMIN — METFORMIN HYDROCHLORIDE 1000 MG: 500 TABLET, FILM COATED ORAL at 16:43

## 2017-04-24 RX ADMIN — ALLOPURINOL 300 MG: 300 TABLET ORAL at 08:28

## 2017-04-24 RX ADMIN — LISINOPRIL 10 MG: 10 TABLET ORAL at 08:26

## 2017-04-24 RX ADMIN — METFORMIN HYDROCHLORIDE 1000 MG: 500 TABLET, FILM COATED ORAL at 08:17

## 2017-04-24 RX ADMIN — FUROSEMIDE 40 MG: 40 TABLET ORAL at 08:26

## 2017-04-24 RX ADMIN — CHOLECALCIFEROL TAB 25 MCG (1000 UNIT) 3000 UNITS: 25 TAB at 08:17

## 2017-04-24 RX ADMIN — ASPIRIN 81 MG: 81 TABLET, CHEWABLE ORAL at 19:43

## 2017-04-24 RX ADMIN — OXYCODONE HYDROCHLORIDE AND ACETAMINOPHEN 2 TABLET: 5; 325 TABLET ORAL at 08:26

## 2017-04-24 RX ADMIN — TAMSULOSIN HYDROCHLORIDE 0.4 MG: 0.4 CAPSULE ORAL at 16:43

## 2017-04-24 RX ADMIN — OXYCODONE HYDROCHLORIDE 10 MG: 10 TABLET ORAL at 03:35

## 2017-04-25 VITALS
DIASTOLIC BLOOD PRESSURE: 90 MMHG | SYSTOLIC BLOOD PRESSURE: 164 MMHG | HEIGHT: 77 IN | OXYGEN SATURATION: 96 % | TEMPERATURE: 98 F | BODY MASS INDEX: 37.19 KG/M2 | WEIGHT: 315 LBS | HEART RATE: 86 BPM | RESPIRATION RATE: 20 BRPM

## 2017-04-25 LAB
ANION GAP SERPL CALCULATED.3IONS-SCNC: 8 MMOL/L (ref 4–13)
BUN SERPL-MCNC: 7 MG/DL (ref 5–25)
CALCIUM SERPL-MCNC: 8.4 MG/DL (ref 8.3–10.1)
CHLORIDE SERPL-SCNC: 105 MMOL/L (ref 100–108)
CO2 SERPL-SCNC: 29 MMOL/L (ref 21–32)
CREAT SERPL-MCNC: 0.59 MG/DL (ref 0.6–1.3)
ERYTHROCYTE [DISTWIDTH] IN BLOOD BY AUTOMATED COUNT: 13.1 % (ref 11.6–15.1)
GFR SERPL CREATININE-BSD FRML MDRD: >60 ML/MIN/1.73SQ M
GLUCOSE SERPL-MCNC: 101 MG/DL (ref 65–140)
GLUCOSE SERPL-MCNC: 115 MG/DL (ref 65–140)
GLUCOSE SERPL-MCNC: 122 MG/DL (ref 65–140)
GLUCOSE SERPL-MCNC: 89 MG/DL (ref 65–140)
HCT VFR BLD AUTO: 32.4 % (ref 36.5–49.3)
HGB BLD-MCNC: 10.7 G/DL (ref 12–17)
MCH RBC QN AUTO: 31.3 PG (ref 26.8–34.3)
MCHC RBC AUTO-ENTMCNC: 33 G/DL (ref 31.4–37.4)
MCV RBC AUTO: 95 FL (ref 82–98)
PLATELET # BLD AUTO: 189 THOUSANDS/UL (ref 149–390)
PMV BLD AUTO: 10.4 FL (ref 8.9–12.7)
POTASSIUM SERPL-SCNC: 3.2 MMOL/L (ref 3.5–5.3)
RBC # BLD AUTO: 3.42 MILLION/UL (ref 3.88–5.62)
SODIUM SERPL-SCNC: 142 MMOL/L (ref 136–145)
WBC # BLD AUTO: 5.6 THOUSAND/UL (ref 4.31–10.16)

## 2017-04-25 PROCEDURE — 80048 BASIC METABOLIC PNL TOTAL CA: CPT | Performed by: INTERNAL MEDICINE

## 2017-04-25 PROCEDURE — 97535 SELF CARE MNGMENT TRAINING: CPT

## 2017-04-25 PROCEDURE — 97116 GAIT TRAINING THERAPY: CPT

## 2017-04-25 PROCEDURE — 82948 REAGENT STRIP/BLOOD GLUCOSE: CPT

## 2017-04-25 PROCEDURE — 97530 THERAPEUTIC ACTIVITIES: CPT

## 2017-04-25 PROCEDURE — 97110 THERAPEUTIC EXERCISES: CPT

## 2017-04-25 PROCEDURE — 85027 COMPLETE CBC AUTOMATED: CPT | Performed by: INTERNAL MEDICINE

## 2017-04-25 RX ORDER — OXYCODONE HYDROCHLORIDE 5 MG/1
5 TABLET ORAL EVERY 4 HOURS PRN
Qty: 30 TABLET | Refills: 0
Start: 2017-04-25 | End: 2017-05-05

## 2017-04-25 RX ADMIN — SENNOSIDES 8.6 MG: 8.6 TABLET, FILM COATED ORAL at 09:04

## 2017-04-25 RX ADMIN — ATORVASTATIN CALCIUM 40 MG: 40 TABLET, FILM COATED ORAL at 17:00

## 2017-04-25 RX ADMIN — ALLOPURINOL 300 MG: 300 TABLET ORAL at 09:03

## 2017-04-25 RX ADMIN — Medication 400 MG: at 09:04

## 2017-04-25 RX ADMIN — ASPIRIN 81 MG: 81 TABLET, CHEWABLE ORAL at 17:00

## 2017-04-25 RX ADMIN — INSULIN ASPART 24 UNITS: 100 INJECTION, SUSPENSION SUBCUTANEOUS at 09:06

## 2017-04-25 RX ADMIN — RIVAROXABAN 10 MG: 20 TABLET, FILM COATED ORAL at 09:04

## 2017-04-25 RX ADMIN — CHOLECALCIFEROL TAB 25 MCG (1000 UNIT) 3000 UNITS: 25 TAB at 09:03

## 2017-04-25 RX ADMIN — LISINOPRIL 10 MG: 10 TABLET ORAL at 09:03

## 2017-04-25 RX ADMIN — PANTOPRAZOLE SODIUM 40 MG: 40 TABLET, DELAYED RELEASE ORAL at 06:29

## 2017-04-25 RX ADMIN — TAMSULOSIN HYDROCHLORIDE 0.4 MG: 0.4 CAPSULE ORAL at 17:00

## 2017-04-25 RX ADMIN — BRIMONIDINE TARTRATE 1 DROP: 1.5 SOLUTION/ DROPS OPHTHALMIC at 09:05

## 2017-04-25 RX ADMIN — POTASSIUM CHLORIDE 20 MEQ: 1500 TABLET, EXTENDED RELEASE ORAL at 09:05

## 2017-04-25 RX ADMIN — FUROSEMIDE 40 MG: 40 TABLET ORAL at 09:05

## 2017-04-25 RX ADMIN — METOPROLOL SUCCINATE 50 MG: 50 TABLET, EXTENDED RELEASE ORAL at 09:04

## 2017-04-25 RX ADMIN — METFORMIN HYDROCHLORIDE 1000 MG: 500 TABLET, FILM COATED ORAL at 09:05

## 2017-04-25 RX ADMIN — DOCUSATE SODIUM 100 MG: 100 CAPSULE, LIQUID FILLED ORAL at 17:00

## 2017-04-25 RX ADMIN — METFORMIN HYDROCHLORIDE 1000 MG: 500 TABLET, FILM COATED ORAL at 17:00

## 2017-04-25 RX ADMIN — FOLIC ACID 1 MG: 1 TABLET ORAL at 09:04

## 2017-04-25 RX ADMIN — INSULIN ASPART 16 UNITS: 100 INJECTION, SUSPENSION SUBCUTANEOUS at 17:01

## 2017-04-25 RX ADMIN — DOCUSATE SODIUM 100 MG: 100 CAPSULE, LIQUID FILLED ORAL at 09:04

## 2017-04-28 ENCOUNTER — ALLSCRIPTS OFFICE VISIT (OUTPATIENT)
Dept: OTHER | Facility: OTHER | Age: 67
End: 2017-04-28

## 2017-10-25 LAB
CREAT ?TM UR-SCNC: 197 UMOL/L
HBA1C MFR BLD HPLC: 6 %
MICROALBUMIN UR-MCNC: 1.5 MG/L (ref 0–20)
MICROALBUMIN/CREAT UR: 8 MG/G{CREAT}

## 2018-01-15 NOTE — PROGRESS NOTES
Chief Complaint  Pt returns to office for PVR  Pt developed post op urinary retention following knee surgery  Soriano placed 4/23/2017 and discharged home with soriano  VNA removed soriano this AM   Pt's PVR 271ml and pt refusing insertion of soriano  Discussed with Dr Laura Batista  Plan to increase Flomax to BID and follow up in 3-4 weeks with PVR and formal urinalysis PTV  Pt aware and agrees  Active Problems    1  Diabetes mellitus type 2, uncontrolled (250 02) (E11 65)   2  Diabetic foot ulcer (250 80,707 15) (E11 621,L97 509)   3  Diabetic neuropathy (250 60,357 2) (E11 40)   4  Gross hematuria (599 71) (R31 0)   5  Urinary retention (788 20) (R33 9)    Current Meds   1  Aspirin 81 MG TABS; Therapy: (Recorded:35Idv1333) to Recorded   2  Cephalexin 500 MG Oral Capsule; TAKE 1 CAPSULE 4 TIMES DAILY; Therapy: [id number] to (Radha Zaidi)  Requested for: 71GYQ8184; Last   Rx:06Arb0154 Ordered   3  Daily Multi Oral Tablet; Therapy: (Recorded:66Lpe7839) to Recorded   4  Glucophage 1000 MG Oral Tablet; Therapy: (Recorded:44Ynr2720) to Recorded   5  Iron 28 MG Oral Tablet; Therapy: (Recorded:66Dht4240) to Recorded   6  Lisinopril 20 MG Oral Tablet; Therapy: (Recorded:39Wiy7932) to Recorded   7  NovoFine 32G X 6 MM Miscellaneous; Therapy: 59GQL2729 to Recorded   8  NovoLOG Mix 70/30 FlexPen (70-30) 100 UNIT/ML Subcutaneous Suspension   Pen-injector; Therapy: 77YHH3682 to Recorded   9  Vitamin B12 100 MCG Oral Tablet; Therapy: (Recorded:90Zik0881) to Recorded    Allergies    1  No Known Drug Allergies    Vitals  Signs    Heart Rate: 80  Respiration: 20  Systolic: 155  Diastolic: 80  Height: 6 ft 5 in  Weight: 342 lb   BMI Calculated: 40 56  BSA Calculated: 2 81    Procedure    Procedure: Bladder Ultrasound Post Void Residual    Test indication: Urinary Retention  Equipment And Procedure: The patient did not measure voided amount  U/S Findings: us pvr 271 31ml  Pt's PVR      Assessment    1  Urinary retention (788 20) (R33 9)   2  Gross hematuria (599 71) (R31 0)    Plan  Gross hematuria    · (1) URINALYSIS WITH MICROSCOPIC; Status:Active; Requested for:28Apr2017;    Perform:[facility]; Due:28Apr2018; Ordered; For:Gross hematuria; Ordered By:Edgard Luna;  Urinary retention    · Tamsulosin HCl - 0 4 MG Oral Capsule; TAKE 1 CAPSULE TWICE DAILY  Jacky Ingles Rx By: Kobi Fuller; Dispense: 0 Days ; #:60 Capsule; Refill: 6; For: Urinary retention; ROMAINE = N; Verified Transmission to [id number] Last Updated By: [facility]; 4/28/2017 3:50:56 PM   · Follow-up visit in 3 weeks Evaluation and Treatment  Follow-up in 3-4 weeks with PVR  and urinalysis PTV  Status: Hold For - Scheduling,Retrospective By Protocol  Authorization  Requested for: 28Apr2017   Ordered;  For: Urinary retention; Ordered By: Kobi Fuller Performed:  Due: 20OLR3575; Last Updated By: Aysha Nova; 4/28/2017 3:56:03 PM    Future Appointments    Date/Time Provider Specialty Site   05/19/2017 01:30 PM [facility] [address]   Electronically signed by : Daksha Wilder, ; Apr 28 2017  3:56PM EST                       (Author)    Electronically signed by : CEE Shipman ; May  7 2017  7:32PM EST

## 2018-01-22 VITALS
BODY MASS INDEX: 37.19 KG/M2 | HEART RATE: 80 BPM | RESPIRATION RATE: 20 BRPM | DIASTOLIC BLOOD PRESSURE: 80 MMHG | WEIGHT: 315 LBS | SYSTOLIC BLOOD PRESSURE: 120 MMHG | HEIGHT: 77 IN

## 2018-03-01 DIAGNOSIS — N40.0 BENIGN PROSTATIC HYPERPLASIA WITHOUT LOWER URINARY TRACT SYMPTOMS: Primary | ICD-10-CM

## 2018-03-02 RX ORDER — TAMSULOSIN HYDROCHLORIDE 0.4 MG/1
CAPSULE ORAL
Qty: 60 CAPSULE | Refills: 6 | Status: SHIPPED | OUTPATIENT
Start: 2018-03-02 | End: 2018-11-09 | Stop reason: HOSPADM

## 2018-05-23 LAB — HBA1C MFR BLD HPLC: 6.2 %

## 2018-09-28 LAB — HBA1C MFR BLD HPLC: 5.9 %

## 2018-10-30 ENCOUNTER — HOSPITAL ENCOUNTER (OUTPATIENT)
Facility: HOSPITAL | Age: 68
Discharge: HOME | End: 2018-11-09
Attending: PHYSICAL MEDICINE & REHABILITATION | Admitting: PHYSICAL MEDICINE & REHABILITATION
Payer: MEDICARE

## 2018-10-30 DIAGNOSIS — E11.49 DIABETES MELLITUS TYPE 2 WITH NEUROLOGICAL MANIFESTATIONS (HCC): ICD-10-CM

## 2018-10-30 DIAGNOSIS — N40.0 PROSTATE HYPERTROPHY: ICD-10-CM

## 2018-10-30 DIAGNOSIS — J69.0 ASPIRATION PNEUMONITIS (HCC): Primary | ICD-10-CM

## 2018-10-30 DIAGNOSIS — I50.22 CHRONIC SYSTOLIC CHF (CONGESTIVE HEART FAILURE) (HCC): ICD-10-CM

## 2018-10-30 DIAGNOSIS — I10 ESSENTIAL HYPERTENSION: ICD-10-CM

## 2018-10-30 DIAGNOSIS — G20 PARKINSONISM (HCC): ICD-10-CM

## 2018-10-30 DIAGNOSIS — I48.20 ATRIAL FIBRILLATION, CHRONIC (HCC): ICD-10-CM

## 2018-10-30 DIAGNOSIS — R26.9 ABNORMAL GAIT: ICD-10-CM
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
## 2018-10-30 PROBLEM — I42.2 HYPERTROPHIC CARDIOMYOPATHY (HCC): Status: ACTIVE | Noted: 2018-10-30

## 2018-10-30 PROBLEM — G20.C PARKINSONISM (HCC): Status: ACTIVE | Noted: 2018-10-30

## 2018-10-30 LAB
GLUCOSE SERPL-MCNC: 142 MG/DL (ref 70–99)
GLUCOSE SERPL-MCNC: 142 MG/DL (ref 70–99)

## 2018-10-30 PROCEDURE — 82948 REAGENT STRIP/BLOOD GLUCOSE: CPT

## 2018-10-30 RX ORDER — LATANOPROST 50 UG/ML
1 SOLUTION/ DROPS OPHTHALMIC
Status: DISCONTINUED | OUTPATIENT
Start: 2018-10-30 | End: 2018-11-09 | Stop reason: HOSPADM

## 2018-10-30 RX ORDER — ALLOPURINOL 300 MG/1
300 TABLET ORAL DAILY
Status: DISCONTINUED | OUTPATIENT
Start: 2018-10-31 | End: 2018-11-09 | Stop reason: HOSPADM

## 2018-10-30 RX ORDER — POLYETHYLENE GLYCOL 3350 17 G/17G
17 POWDER, FOR SOLUTION ORAL DAILY
Status: DISCONTINUED | OUTPATIENT
Start: 2018-10-31 | End: 2018-11-09 | Stop reason: HOSPADM

## 2018-10-30 RX ORDER — FUROSEMIDE 40 MG/1
40 TABLET ORAL DAILY
Status: DISCONTINUED | OUTPATIENT
Start: 2018-10-31 | End: 2018-11-09 | Stop reason: HOSPADM

## 2018-10-30 RX ORDER — BRIMONIDINE TARTRATE 0.15 %
1 DROPS OPHTHALMIC (EYE) 3 TIMES DAILY
Status: DISCONTINUED | OUTPATIENT
Start: 2018-10-30 | End: 2018-11-09 | Stop reason: HOSPADM

## 2018-10-30 RX ORDER — AMIODARONE HYDROCHLORIDE 200 MG/1
200 TABLET ORAL
Status: DISCONTINUED | OUTPATIENT
Start: 2018-10-31 | End: 2018-11-09 | Stop reason: HOSPADM

## 2018-10-30 RX ORDER — TAMSULOSIN HYDROCHLORIDE 0.4 MG/1
0.4 CAPSULE ORAL
Status: DISCONTINUED | OUTPATIENT
Start: 2018-10-30 | End: 2018-11-09 | Stop reason: HOSPADM

## 2018-10-30 RX ORDER — FOLIC ACID 1 MG/1
1 TABLET ORAL DAILY
Status: DISCONTINUED | OUTPATIENT
Start: 2018-10-31 | End: 2018-11-09 | Stop reason: HOSPADM

## 2018-10-30 RX ORDER — MELATONIN
3000 DAILY
Status: DISCONTINUED | OUTPATIENT
Start: 2018-10-31 | End: 2018-10-31

## 2018-10-30 RX ORDER — METOPROLOL SUCCINATE 50 MG/1
100 TABLET, EXTENDED RELEASE ORAL DAILY
Status: DISCONTINUED | OUTPATIENT
Start: 2018-10-31 | End: 2018-11-09 | Stop reason: HOSPADM

## 2018-10-30 RX ORDER — POTASSIUM CHLORIDE 750 MG/1
20 TABLET, EXTENDED RELEASE ORAL DAILY
Status: DISCONTINUED | OUTPATIENT
Start: 2018-10-31 | End: 2018-11-09 | Stop reason: HOSPADM

## 2018-10-30 RX ORDER — LISINOPRIL 10 MG/1
10 TABLET ORAL DAILY
Status: DISCONTINUED | OUTPATIENT
Start: 2018-10-31 | End: 2018-11-09 | Stop reason: HOSPADM

## 2018-10-30 RX ORDER — ACETAMINOPHEN 325 MG/1
650 TABLET ORAL EVERY 6 HOURS PRN
Status: DISCONTINUED | OUTPATIENT
Start: 2018-10-30 | End: 2018-11-09 | Stop reason: HOSPADM

## 2018-10-30 RX ORDER — AMOXICILLIN 250 MG
1 CAPSULE ORAL
Status: DISCONTINUED | OUTPATIENT
Start: 2018-10-30 | End: 2018-11-09 | Stop reason: HOSPADM

## 2018-10-30 RX ORDER — ATORVASTATIN CALCIUM 40 MG/1
40 TABLET, FILM COATED ORAL EVERY EVENING
Status: DISCONTINUED | OUTPATIENT
Start: 2018-10-30 | End: 2018-11-09 | Stop reason: HOSPADM

## 2018-10-30 RX ORDER — ASPIRIN 81 MG/1
81 TABLET ORAL DAILY
Status: DISCONTINUED | OUTPATIENT
Start: 2018-10-31 | End: 2018-11-09 | Stop reason: HOSPADM

## 2018-10-30 RX ORDER — INSULIN GLARGINE 100 [IU]/ML
18 INJECTION, SOLUTION SUBCUTANEOUS
Status: DISCONTINUED | OUTPATIENT
Start: 2018-10-30 | End: 2018-10-31

## 2018-10-30 RX ADMIN — METFORMIN HYDROCHLORIDE 1000 MG: 500 TABLET ORAL at 18:59

## 2018-10-30 RX ADMIN — TAMSULOSIN HYDROCHLORIDE 0.4 MG: 0.4 CAPSULE ORAL at 19:05

## 2018-10-30 RX ADMIN — RIVAROXABAN 20 MG: 20 TABLET, FILM COATED ORAL at 18:59

## 2018-10-30 RX ADMIN — SENNOSIDES AND DOCUSATE SODIUM 1 TABLET: 8.6; 5 TABLET ORAL at 21:41

## 2018-10-30 RX ADMIN — LATANOPROST 1 DROP: 50 SOLUTION OPHTHALMIC at 21:41

## 2018-10-30 RX ADMIN — ATORVASTATIN CALCIUM 40 MG: 40 TABLET, FILM COATED ORAL at 21:40

## 2018-10-30 RX ADMIN — INSULIN GLARGINE 18 UNITS: 100 INJECTION, SOLUTION SUBCUTANEOUS at 21:40

## 2018-10-30 RX ADMIN — ACETAMINOPHEN 650 MG: 325 TABLET ORAL at 23:29

## 2018-10-30 RX ADMIN — BRIMONIDINE TARTRATE 1 DROP: 1.5 SOLUTION OPHTHALMIC at 21:40

## 2018-10-31 LAB
GLUCOSE SERPL-MCNC: 128 MG/DL (ref 70–99)
GLUCOSE SERPL-MCNC: 140 MG/DL (ref 70–99)
GLUCOSE SERPL-MCNC: 191 MG/DL (ref 70–99)
GLUCOSE SERPL-MCNC: 213 MG/DL (ref 70–99)

## 2018-10-31 PROCEDURE — 82948 REAGENT STRIP/BLOOD GLUCOSE: CPT

## 2018-10-31 PROCEDURE — 93005 ELECTROCARDIOGRAM TRACING: CPT

## 2018-10-31 RX ORDER — LANOLIN ALCOHOL/MO/W.PET/CERES
1000 CREAM (GRAM) TOPICAL DAILY
Status: DISCONTINUED | OUTPATIENT
Start: 2018-10-31 | End: 2018-11-09 | Stop reason: HOSPADM

## 2018-10-31 RX ORDER — MELATONIN
1000 DAILY
Status: DISCONTINUED | OUTPATIENT
Start: 2018-11-01 | End: 2018-11-09 | Stop reason: HOSPADM

## 2018-10-31 RX ORDER — INSULIN GLARGINE 100 [IU]/ML
18 INJECTION, SOLUTION SUBCUTANEOUS
Status: DISCONTINUED | OUTPATIENT
Start: 2018-11-01 | End: 2018-11-05

## 2018-10-31 RX ADMIN — ACETAMINOPHEN 650 MG: 325 TABLET ORAL at 08:08

## 2018-10-31 RX ADMIN — BRIMONIDINE TARTRATE 1 DROP: 1.5 SOLUTION OPHTHALMIC at 08:11

## 2018-10-31 RX ADMIN — METFORMIN HYDROCHLORIDE 1000 MG: 500 TABLET ORAL at 18:07

## 2018-10-31 RX ADMIN — ATORVASTATIN CALCIUM 40 MG: 40 TABLET, FILM COATED ORAL at 18:08

## 2018-10-31 RX ADMIN — CYANOCOBALAMIN TAB 1000 MCG 1000 MCG: 1000 TAB at 11:46

## 2018-10-31 RX ADMIN — METFORMIN HYDROCHLORIDE 1000 MG: 500 TABLET ORAL at 08:05

## 2018-10-31 RX ADMIN — LATANOPROST 1 DROP: 50 SOLUTION OPHTHALMIC at 21:50

## 2018-10-31 RX ADMIN — LISINOPRIL 10 MG: 10 TABLET ORAL at 08:05

## 2018-10-31 RX ADMIN — INSULIN LISPRO 1 UNITS: 100 INJECTION, SOLUTION INTRAVENOUS; SUBCUTANEOUS at 16:44

## 2018-10-31 RX ADMIN — POTASSIUM CHLORIDE 20 MEQ: 750 TABLET, EXTENDED RELEASE ORAL at 08:05

## 2018-10-31 RX ADMIN — FUROSEMIDE 40 MG: 40 TABLET ORAL at 08:05

## 2018-10-31 RX ADMIN — VITAMIN D, TAB 1000IU (100/BT) 3000 UNITS: 25 TAB at 08:06

## 2018-10-31 RX ADMIN — RIVAROXABAN 20 MG: 20 TABLET, FILM COATED ORAL at 18:08

## 2018-10-31 RX ADMIN — ALLOPURINOL 300 MG: 300 TABLET ORAL at 08:05

## 2018-10-31 RX ADMIN — Medication 1 TABLET: at 08:05

## 2018-10-31 RX ADMIN — SENNOSIDES AND DOCUSATE SODIUM 1 TABLET: 8.6; 5 TABLET ORAL at 21:43

## 2018-10-31 RX ADMIN — METOPROLOL SUCCINATE 100 MG: 50 TABLET, EXTENDED RELEASE ORAL at 08:05

## 2018-10-31 RX ADMIN — MAGNESIUM OXIDE TAB 400 MG (241.3 MG ELEMENTAL MG) 400 MG: 400 (241.3 MG) TAB at 08:05

## 2018-10-31 RX ADMIN — INSULIN LISPRO 1 UNITS: 100 INJECTION, SOLUTION INTRAVENOUS; SUBCUTANEOUS at 09:54

## 2018-10-31 RX ADMIN — INSULIN LISPRO 1 UNITS: 100 INJECTION, SOLUTION INTRAVENOUS; SUBCUTANEOUS at 21:42

## 2018-10-31 RX ADMIN — ASPIRIN 81 MG: 81 TABLET, COATED ORAL at 08:08

## 2018-10-31 RX ADMIN — BRIMONIDINE TARTRATE 1 DROP: 1.5 SOLUTION OPHTHALMIC at 16:45

## 2018-10-31 RX ADMIN — POLYETHYLENE GLYCOL 3350 17 G: 17 POWDER, FOR SOLUTION ORAL at 08:06

## 2018-10-31 RX ADMIN — BRIMONIDINE TARTRATE 1 DROP: 1.5 SOLUTION OPHTHALMIC at 21:50

## 2018-10-31 RX ADMIN — TAMSULOSIN HYDROCHLORIDE 0.4 MG: 0.4 CAPSULE ORAL at 18:08

## 2018-10-31 RX ADMIN — AMIODARONE HYDROCHLORIDE 200 MG: 200 TABLET ORAL at 08:06

## 2018-10-31 RX ADMIN — FOLIC ACID 1 MG: 1 TABLET ORAL at 08:06

## 2018-10-31 NOTE — ASSESSMENT & PLAN NOTE
As above, the patient's abnormal gait is likely multifactorial in nature and will likely benefit from rehabilitation therapies

## 2018-10-31 NOTE — ASSESSMENT & PLAN NOTE
The patient has the abnormal gait, likely multifactorial with contributions from parkinsonism and diabetic peripheral polyneuropathy and physical deconditioning and cerebral microangiopathy, among others  He is an appropriate candidate for rehabilitation therapies to improve gait quality and safety  He needs to be independent with elevations at home  He currently needs assistance  We will proceed with a comprehensive rehabilitation program for gait assessment and improvement

## 2018-10-31 NOTE — ASSESSMENT & PLAN NOTE
We will follow daily weights as well as symptoms and continue the current regimen  Internal Medicine consultation is pending

## 2018-10-31 NOTE — PROGRESS NOTES
10/31/18 0930   Vital Signs   Pulse 67   Blood Pressure 125/66   BP Location Right arm   BP Method Automatic   Patient Position - Orthostatic VS Sitting   Oxygen Therapy   SpO2 94 %

## 2018-10-31 NOTE — ASSESSMENT & PLAN NOTE
As above, cardiology management is ongoing  We will continue the current medication regimen as he is hemodynamically stable

## 2018-10-31 NOTE — ASSESSMENT & PLAN NOTE
I reviewed the progress note from the [facility] indicating that the patient should continue amiodarone and metoprolol as well as anticoagulation  He continues on these medications  EKG today indicates sinus rhythm with bundle branch block  He states that he has to cardiologists and is not sure Danni Ndiaye is in charge"  I will try to contact the primary care physician to clarify medications and management strategies  The patient is currently hemodynamically stable without signs of decompensation

## 2018-10-31 NOTE — ASSESSMENT & PLAN NOTE
He has evidence of pneumonitis on CT scan and aspiration on video swallow  Speech language pathology has been consulted for swallowing  He is on the modified liquids

## 2018-10-31 NOTE — PROGRESS NOTES
10/31/18 0900   Restrictions/Precautions   Precautions Fall Risk   QI: [address] Provided by [place] No physical assistance   Eating CARE Score 6   QI: Oral Hygiene   Assistance Needed Set-up / [address] Provided by [place] No physical assistance   Oral Hygiene CARE Score 5   Grooming   Able To Shave;Comb/Brush Hair;Wash/Dry Face;Brush/Clean Teeth;Wash/Dry Hands   Grooming (FIM) 5 - [place] sets up supplies or applies device   QI: Shower/Bathe Self   Assistance Needed Physical assistance   Assistance Provided by [place] 25%-49%   Shower/Bathe Self CARE Score 3   Bathing   Assessed Bath Style Shower   Able to Gather/Transport No   Able to Adjust Water Temperature No   Able to Wash/Rinse/Dry (body part) Left Arm;Right Arm;L Upper Leg;R Upper Leg;L Lower Leg/Foot;R Lower Leg/Foot;Chest;Perineal Area   Limitations Noted in Balance; Safety   Positioning Seated;Standing   Bathing (FIM) 4 - Patient completes 8/10 or 9/10 parts   Tub/Shower Transfer   Limitations Noted In Balance;Problem Solving; Safety   Adaptive Equipment Grab Bars;Transfer Bench   Shower Transfer (FIM) 3 - [place] needs to lift, boost or assist to stand OR sit   QI: Upper Body Dressing   Assistance Needed Physical assistance   Assistance Provided by [place] 25%-49%   Upper Body Dressing CARE Score 3   QI: Lower Body Dressing   Assistance Needed Physical assistance   Assistance Provided by [place] 25%-49%   Lower Body Dressing CARE Score 3   QI: Putting On/Taking Off Footwear   Assistance Needed Supervision   Putting On/Taking Off Footwear CARE Score 4   Dressing/Undressing Clothing   Remove UB Clothes Button Shirt   Remove LB Clothes Pants; Shoes   Don UB Clothes Button Shirt   Don LB Clothes Pants; Shoes   Limitations Noted In Balance; Safety   Positioning Supported Sit   UB Dressing (FIM) 4 - Patient completes 75% of all tasks   LB Dressing (FIM) 4 - Patient completes 75% of all tasks   QI: [place] Dunn Assistance Needed Physical assistance   Assistance Provided by [place] 25%-49%   Toileting Hygiene CARE Score 3   Toileting   Able to [address] down yes, up no  Limitations Noted In Balance;Problem Solving; Safety   Adaptive Equipment Grab Bar   Toileting (FIM) 3 - Patient completes  50-74% of all tasks   QI: Toilet Transfer   Assistance Needed Physical assistance   Assistance Provided by [place] 50%-74%   Toilet Transfer CARE Score 2   Toilet Transfer   Surface Assessed Raised Toilet   Limitations Noted In Balance;Problem Solving; Safety   Adaptive Equipment Grab Bar   Toilet Transfer (FIM) 3 - Patient completes 50 - 74% of all tasks   Transfers   Sit to Stand 4  Minimal assistance   Stand to Sit 3  Moderate assistance   Other Comments   Assessment Pt  seen for mod  complex  eval , 60 minutes  total  (4814-2708) tolerated well with stable vitals  Noted impulsivity and poor walker safety throughout

## 2018-10-31 NOTE — ASSESSMENT & PLAN NOTE
He has the diagnosis of parkinsonism but is on no medications  I will contact the neurologist regarding further management and possible starting medication  In addition, rehabilitation therapies to work on strength and balance and gait quality and movement pattern will proceed

## 2018-10-31 NOTE — ASSESSMENT & PLAN NOTE
Lab Results   Component Value Date    HGBA1C 6 3 04/22/2017     The Accu-Chek blood glucose values are variable  The patient was on a stable dose of 70/ 30 insulin, 2 times per day  He is currently on Lantus with coverage  I will review with Internal Medicine and plan to reestablish the patient's regimen of 2 times per day 70 /30 insulin if appropriate    Recent Labs      10/30/18   2117  10/31/18   0634  10/31/18   0949  10/31/18   1145   POCGLU  142*  128*  213*  140*       Blood Sugar Average: Last 72 hrs:  (P) 153

## 2018-10-31 NOTE — PLAN OF CARE
Problem: PAIN - ADULT  Goal: Verbalizes/displays adequate comfort level or baseline comfort level  Interventions:  - Encourage patient to monitor pain and request assistance  - Assess pain using appropriate pain scale  - Administer analgesics based on type and severity of pain and evaluate response  - Implement non-pharmacological measures as appropriate and evaluate response  - Consider cultural and social influences on pain and pain management  - Notify physician/advanced practitioner if interventions unsuccessful or patient reports new pain  Outcome: Progressing      Problem: SAFETY ADULT  Goal: Maintain or return to baseline ADL function  INTERVENTIONS:  -  Assess patient's ability to carry out ADLs; assess patient's baseline for ADL function and identify physical deficits which impact ability to perform ADLs (bathing, care of mouth/teeth, toileting, grooming, dressing, etc )  - Assess/evaluate cause of self-care deficits   - Assess range of motion  - Assess patient's mobility; develop plan if impaired  - Assess patient's need for assistive devices and provide as appropriate  - Encourage maximum independence but intervene and supervise when necessary  ¯ Involve family in performance of ADLs  ¯ Assess for home care needs following discharge   ¯ Request OT consult to assist with ADL evaluation and planning for discharge  ¯ Provide patient education as appropriate  Outcome: Progressing    Goal: Maintain or return mobility status to optimal level  INTERVENTIONS:  - Assess patient's baseline mobility status (ambulation, transfers, stairs, etc )    - Identify cognitive and physical deficits and behaviors that affect mobility  - Identify mobility aids required to assist with transfers and/or ambulation (gait belt, sit-to-stand, lift, walker, cane, etc )  - [place] fall precautions as indicated by assessment  - Record patient progress and toleration of activity level on Mobility SBAR; progress patient to next Phase/Stage  - Instruct patient to call for assistance with activity based on assessment  - Request Rehabilitation consult to assist with strengthening/weightbearing, etc   Outcome: Progressing      Problem: DISCHARGE PLANNING  Goal: Discharge to home or other facility with appropriate resources  INTERVENTIONS:  - Identify barriers to discharge w/patient and caregiver  - Arrange for needed discharge resources and transportation as appropriate  - Identify discharge learning needs (meds, wound care, etc )  - Arrange for interpretive services to assist at discharge as needed  - Refer to Case Management Department for coordinating discharge planning if the patient needs post-hospital services based on physician/advanced practitioner order or complex needs related to functional status, cognitive ability, or social support system  Outcome: Progressing

## 2018-10-31 NOTE — ASSESSMENT & PLAN NOTE
Blood pressure is adequate on the current regimen  Will continue to monitor the blood pressure  Internal Medicine is consulted for further blood pressure management

## 2018-10-31 NOTE — PLAN OF CARE
Problem: Potential for Falls  Goal: Patient will remain free of falls  INTERVENTIONS:  - Assess patient frequently for physical needs  -  Identify cognitive and physical deficits and behaviors that affect risk of falls    -  [place] fall precautions as indicated by assessment   - Educate patient/family on patient safety including physical limitations  - Instruct patient to call for assistance with activity based on assessment  - Modify environment to reduce risk of injury  - Consider OT/PT consult to assist with strengthening/mobility   Outcome: Progressing      Problem: MUSCULOSKELETAL - ADULT  Goal: Maintain or return mobility to safest level of function  INTERVENTIONS:  - Assess patient's ability to carry out ADLs; assess patient's baseline for ADL function and identify physical deficits which impact ability to perform ADLs (bathing, care of mouth/teeth, toileting, grooming, dressing, etc )  - Assess/evaluate cause of self-care deficits   - Assess range of motion  - Assess patient's mobility; develop plan if impaired  - Assess patient's need for assistive devices and provide as appropriate  - Encourage maximum independence but intervene and supervise when necessary  - Involve family in performance of ADLs  - Assess for home care needs following discharge   - Request OT consult to assist with ADL evaluation and planning for discharge  - Provide patient education as appropriate  Outcome: Progressing    Goal: Maintain proper alignment of affected body part  INTERVENTIONS:  - Support, maintain and protect limb and body alignment  - Provide pt/fam with appropriate education  Outcome: Progressing

## 2018-10-31 NOTE — PROGRESS NOTES
Progress Note - Miguel Ángel Sheth 1950, 77 y o  male MRN: [id number]    Unit/Bed#: [facility] 269-02 Encounter: [id number]    Primary Care Provider: No primary care provider on file  Date and time admitted to hospital: 10/30/2018  4:37 PM        Chronic systolic CHF (congestive heart failure) ([facility] )   Assessment & Plan    We will follow daily weights as well as symptoms and continue the current regimen  Internal Medicine consultation is pending  Hypertrophic cardiomyopathy ([facility] )   Assessment & Plan    As above, cardiology management is ongoing  We will continue the current medication regimen as he is hemodynamically stable  Atrial fibrillation, chronic (HCC)   Assessment & Plan    I reviewed the progress note from the [facility] indicating that the patient should continue amiodarone and metoprolol as well as anticoagulation  He continues on these medications  EKG today indicates sinus rhythm with bundle branch block  He states that he has to cardiologists and is not sure Claudio Metzegr is in charge"  I will try to contact the primary care physician to clarify medications and management strategies  The patient is currently hemodynamically stable without signs of decompensation  Diabetes mellitus type 2 with neurological manifestations [facility])   Assessment & Plan    Lab Results   Component Value Date    HGBA1C 6 3 04/22/2017     The Accu-Chek blood glucose values are variable  The patient was on a stable dose of 70/ 30 insulin, 2 times per day  He is currently on Lantus with coverage  I will review with Internal Medicine and plan to reestablish the patient's regimen of 2 times per day 70 /30 insulin if appropriate  Recent Labs      10/30/18   2117  10/31/18   0634  10/31/18   0949  10/31/18   1145   POCGLU  142*  128*  213*  140*       Blood Sugar Average: Last 72 hrs:  (P) 153     HTN (hypertension)   Assessment & Plan    Blood pressure is adequate on the current regimen    Will continue to monitor the blood pressure  Internal Medicine is consulted for further blood pressure management  Aspiration pneumonitis [facility])   Assessment & Plan    He has evidence of pneumonitis on CT scan and aspiration on video swallow  Speech language pathology has been consulted for swallowing  He is on the modified liquids  Abnormal gait   Assessment & Plan    As above, the patient's abnormal gait is likely multifactorial in nature and will likely benefit from rehabilitation therapies  Fall (on) (from) unspecified stairs and steps, sequela   Assessment & Plan    The patient has the abnormal gait, likely multifactorial with contributions from parkinsonism and diabetic peripheral polyneuropathy and physical deconditioning and cerebral microangiopathy, among others  He is an appropriate candidate for rehabilitation therapies to improve gait quality and safety  He needs to be independent with elevations at home  He currently needs assistance  We will proceed with a comprehensive rehabilitation program for gait assessment and improvement  * Parkinsonism [facility])   Assessment & Plan    He has the diagnosis of parkinsonism but is on no medications  I will contact the neurologist regarding further management and possible starting medication  In addition, rehabilitation therapies to work on strength and balance and gait quality and movement pattern will proceed  Subjective/Objective     Subjective:   He reports feeling well today  He states that sleep is adequate and appetite is adequate as well  He reports no new difficulties with bowel or bladder function  He admits chronic constipation and is currently on increased bowel medications  He reports no chest pain or shortness of breath or other new discomfort or difficulties  Objective:  Vitals: Blood pressure 130/69, pulse 69, temperature 98 1 °F (36 7 °C), temperature source Temporal, resp   rate 18, height 6' 5" (1 956 m), weight (!) 147 kg (324 lb 5 oz), SpO2 95 %  ,Body mass index is 38 46 kg/m²  Intake/Output Summary (Last 24 hours) at 10/31/18 1231  Last data filed at 10/31/18 0257   Gross per 24 hour   Intake                0 ml   Output              400 ml   Net             -400 ml       Invasive Devices          No matching active lines, drains, or airways          Physical Exam: Vital signs are reviewed  The head is normocephalic and atraumatic  Neck has functional range of motion  Mucous membranes are moist without erythema or exudate  Cardiac rhythm is regular without significant ectopy  Lungs are clear without wheeze or rhonchi  Abdomen is soft and nontender and obese  Limb examination reveals diminished pulses in the feet but pulses are present at the wrists  Both lower limbs have no calf tenderness or edema  There is significant deformity of the left foot, consistent with his history of Charcot foot and surgery to correct it  The skin of the feet is intact except for small superficial abrasions on the dorsum of toes bilaterally  Neurologic examination is remarkable for diminished facial expression and diminished blinking and bradykinesia  There is no resting tremor  Lab, Imaging and other studies: I have personally reviewed pertinent reports      VTE Pharmacologic Prophylaxis: Reason for no pharmacologic prophylaxis He is on Xarelto  VTE Mechanical Prophylaxis: reason for no mechanical VTE prophylaxis He is on Xarelto

## 2018-11-01 PROBLEM — R06.00 DYSPNEA ON EXERTION: Status: ACTIVE | Noted: 2018-11-01

## 2018-11-01 PROBLEM — R06.09 DYSPNEA ON EXERTION: Status: ACTIVE | Noted: 2018-11-01

## 2018-11-01 LAB
ALBUMIN SERPL BCP-MCNC: 3.4 G/DL (ref 3–5.2)
ALP SERPL-CCNC: 67 U/L (ref 43–122)
ALT SERPL W P-5'-P-CCNC: 28 U/L (ref 9–52)
ANION GAP SERPL CALCULATED.3IONS-SCNC: 8 MMOL/L (ref 5–14)
AST SERPL W P-5'-P-CCNC: 18 U/L (ref 17–59)
BILIRUB SERPL-MCNC: 1 MG/DL
BUN SERPL-MCNC: 13 MG/DL (ref 5–25)
CALCIUM SERPL-MCNC: 8.8 MG/DL (ref 8.4–10.2)
CHLORIDE SERPL-SCNC: 99 MMOL/L (ref 97–108)
CO2 SERPL-SCNC: 31 MMOL/L (ref 22–30)
CREAT SERPL-MCNC: 0.65 MG/DL (ref 0.7–1.5)
D-DIMER: 2.07 MG/L
ERYTHROCYTE [DISTWIDTH] IN BLOOD BY AUTOMATED COUNT: 13.3 %
GFR SERPL CREATININE-BSD FRML MDRD: 99 ML/MIN/1.73SQ M
GLUCOSE P FAST SERPL-MCNC: 115 MG/DL (ref 70–99)
GLUCOSE SERPL-MCNC: 115 MG/DL (ref 70–99)
GLUCOSE SERPL-MCNC: 125 MG/DL (ref 70–99)
GLUCOSE SERPL-MCNC: 139 MG/DL (ref 70–99)
GLUCOSE SERPL-MCNC: 144 MG/DL (ref 70–99)
GLUCOSE SERPL-MCNC: 163 MG/DL (ref 70–99)
GLUCOSE SERPL-MCNC: 188 MG/DL (ref 70–99)
HCT VFR BLD AUTO: 33.9 % (ref 41–53)
HGB BLD-MCNC: 11.4 G/DL (ref 13.5–17.5)
MCH RBC QN AUTO: 32.2 PG (ref 26–34)
MCHC RBC AUTO-ENTMCNC: 33.7 G/DL (ref 31–36)
MCV RBC AUTO: 96 FL (ref 80–100)
NT-PROBNP SERPL-MCNC: 63.5 PG/ML (ref 0–299)
PLATELET # BLD AUTO: 240 THOUSANDS/UL (ref 150–450)
PMV BLD AUTO: 8.1 FL (ref 8.9–12.7)
POTASSIUM SERPL-SCNC: 3.8 MMOL/L (ref 3.6–5)
PROT SERPL-MCNC: 5.8 G/DL (ref 5.9–8.4)
RBC # BLD AUTO: 3.54 MILLION/UL (ref 4.5–5.9)
SODIUM SERPL-SCNC: 138 MMOL/L (ref 137–147)
TROPONIN I SERPL-MCNC: <0.01 NG/ML (ref 0–0.03)
WBC # BLD AUTO: 6.6 THOUSAND/UL (ref 4.5–11)

## 2018-11-01 PROCEDURE — 80053 COMPREHEN METABOLIC PANEL: CPT | Performed by: PHYSICAL MEDICINE & REHABILITATION

## 2018-11-01 PROCEDURE — 85379 FIBRIN DEGRADATION QUANT: CPT | Performed by: PHYSICAL MEDICINE & REHABILITATION

## 2018-11-01 PROCEDURE — 83880 ASSAY OF NATRIURETIC PEPTIDE: CPT | Performed by: PHYSICAL MEDICINE & REHABILITATION

## 2018-11-01 PROCEDURE — 84484 ASSAY OF TROPONIN QUANT: CPT | Performed by: INTERNAL MEDICINE

## 2018-11-01 PROCEDURE — 85027 COMPLETE CBC AUTOMATED: CPT | Performed by: PHYSICAL MEDICINE & REHABILITATION

## 2018-11-01 PROCEDURE — 82948 REAGENT STRIP/BLOOD GLUCOSE: CPT

## 2018-11-01 PROCEDURE — 93005 ELECTROCARDIOGRAM TRACING: CPT

## 2018-11-01 PROCEDURE — 99305 1ST NF CARE MODERATE MDM 35: CPT | Performed by: INTERNAL MEDICINE

## 2018-11-01 RX ORDER — THIAMINE MONONITRATE (VIT B1) 100 MG
100 TABLET ORAL DAILY
Status: DISCONTINUED | OUTPATIENT
Start: 2018-11-01 | End: 2018-11-09 | Stop reason: HOSPADM

## 2018-11-01 RX ADMIN — INSULIN GLARGINE 18 UNITS: 100 INJECTION, SOLUTION SUBCUTANEOUS at 08:29

## 2018-11-01 RX ADMIN — BRIMONIDINE TARTRATE 1 DROP: 1.5 SOLUTION OPHTHALMIC at 08:33

## 2018-11-01 RX ADMIN — INSULIN LISPRO 1 UNITS: 100 INJECTION, SOLUTION INTRAVENOUS; SUBCUTANEOUS at 12:21

## 2018-11-01 RX ADMIN — POTASSIUM CHLORIDE 20 MEQ: 750 TABLET, EXTENDED RELEASE ORAL at 08:29

## 2018-11-01 RX ADMIN — LATANOPROST 1 DROP: 50 SOLUTION OPHTHALMIC at 21:36

## 2018-11-01 RX ADMIN — METFORMIN HYDROCHLORIDE 1000 MG: 500 TABLET ORAL at 17:59

## 2018-11-01 RX ADMIN — TAMSULOSIN HYDROCHLORIDE 0.4 MG: 0.4 CAPSULE ORAL at 17:59

## 2018-11-01 RX ADMIN — FOLIC ACID 1 MG: 1 TABLET ORAL at 08:28

## 2018-11-01 RX ADMIN — SENNOSIDES AND DOCUSATE SODIUM 1 TABLET: 8.6; 5 TABLET ORAL at 21:33

## 2018-11-01 RX ADMIN — ATORVASTATIN CALCIUM 40 MG: 40 TABLET, FILM COATED ORAL at 18:00

## 2018-11-01 RX ADMIN — CYANOCOBALAMIN TAB 1000 MCG 1000 MCG: 1000 TAB at 08:28

## 2018-11-01 RX ADMIN — METOPROLOL SUCCINATE 100 MG: 50 TABLET, EXTENDED RELEASE ORAL at 08:29

## 2018-11-01 RX ADMIN — BRIMONIDINE TARTRATE 1 DROP: 1.5 SOLUTION OPHTHALMIC at 21:36

## 2018-11-01 RX ADMIN — RIVAROXABAN 20 MG: 20 TABLET, FILM COATED ORAL at 17:59

## 2018-11-01 RX ADMIN — METFORMIN HYDROCHLORIDE 1000 MG: 500 TABLET ORAL at 08:28

## 2018-11-01 RX ADMIN — VITAMIN D, TAB 1000IU (100/BT) 1000 UNITS: 25 TAB at 08:28

## 2018-11-01 RX ADMIN — AMIODARONE HYDROCHLORIDE 200 MG: 200 TABLET ORAL at 08:28

## 2018-11-01 RX ADMIN — MAGNESIUM OXIDE TAB 400 MG (241.3 MG ELEMENTAL MG) 400 MG: 400 (241.3 MG) TAB at 08:28

## 2018-11-01 RX ADMIN — ALLOPURINOL 300 MG: 300 TABLET ORAL at 08:29

## 2018-11-01 RX ADMIN — BRIMONIDINE TARTRATE 1 DROP: 1.5 SOLUTION OPHTHALMIC at 16:56

## 2018-11-01 RX ADMIN — THIAMINE HCL TAB 100 MG 100 MG: 100 TAB at 14:25

## 2018-11-01 RX ADMIN — FUROSEMIDE 40 MG: 40 TABLET ORAL at 08:28

## 2018-11-01 RX ADMIN — ASPIRIN 81 MG: 81 TABLET, COATED ORAL at 08:29

## 2018-11-01 RX ADMIN — Medication 1 TABLET: at 08:28

## 2018-11-01 RX ADMIN — LISINOPRIL 10 MG: 10 TABLET ORAL at 08:29

## 2018-11-01 NOTE — ASSESSMENT & PLAN NOTE
77-year-old gentleman hospitalized at [facility] for rehab program after a fall admitted at [facility] due to unsteady gait transferred to [facility] for rehab program patient fall was accidental no syncope no major injury no fracture but he was unsteady on gait patient was workup prior to that for Parkinson's disease and follow by Neurology but no antiparkinson medication given patient here during physiotherapy he did at least 2 hr p  hysiotherapy while going from 1 place to another short of breath block was very short distance denied any dizziness no lightheaded no chest pain no other symptom no postural hypotension blood pressure 128/65 standing  122/65 mm sitting  130/68 standing pulse ox 95 on room exam was negative no fluid overload no shortness of breath time of exam lungs was clear EKG normal sinus rhythm with left bundle branch block no change from admission he had a long history of AFib but now in sinus rhythm on anti called relation heart rate well controlled 68 per minute all the lab reviewed all within normal range BNP normal  Patient does have a long history of drinking drink vadka 4-6 glass daily he 5 drinks almost half a gal on daily basis noted not on thiamine previously had MRI CT of the head and 2D echo  2D echo ejection fraction 35-40 range MRI of the brain did show some vascular ischemia but no acute distress   Most probably shortness of breath have came from over exertion patient is heavy and activity is very limited at with exercise he became short of breath but at present time no evidence of acute congestive heart failure  No change in treatment plan with continue same medication he is fully anticoagulated  And his symptoms all resolved continue to observe but he can continue physiotherapy

## 2018-11-01 NOTE — NURSING NOTE
Patient was seen by speech therapist earlier this afternoon  No new orders  Noticed in patient's room that he has a drinking container with ice and he said that it was brought by his wife  Advised the patient that he's not allowed to have ice because when it melts it's no longer nectar thick and he's currently on nectar thick fluids only  Patient agreed  Will continue to monitor

## 2018-11-01 NOTE — PLAN OF CARE
DISCHARGE PLANNING     Discharge to home or other facility with appropriate resources Progressing        MUSCULOSKELETAL - ADULT     Maintain or return mobility to safest level of function Progressing     Maintain proper alignment of affected body part Progressing        PAIN - ADULT     Verbalizes/displays adequate comfort level or baseline comfort level Progressing        Potential for Falls     Patient will remain free of falls Progressing        SAFETY ADULT     Maintain or return to baseline ADL function Progressing     Maintain or return mobility status to optimal level Progressing

## 2018-11-01 NOTE — ASSESSMENT & PLAN NOTE
Patient with long-standing history of AFib had ablation done in [place] on 2 /18 then patient became had AFib again then cardioversion done now patient is still in sinus rhythm EKG yesterday and today sinus rhythm with rate 68 per minute bundle branch block patient is on amidrone and beta-blocker tolerating but also fully anticoagulated

## 2018-11-01 NOTE — ASSESSMENT & PLAN NOTE
Patient was seen by neurologist for abnormal gait questionable Parkinson's diagnosis made no medication is started may consider 2nd opinion of Neurology see medication may be helpful Will discuss with Dr Vickey Bradley

## 2018-11-01 NOTE — ASSESSMENT & PLAN NOTE
History of fall at home for [facility] no acute injury multiple x-ray negative no loss of consciousness

## 2018-11-01 NOTE — NURSING NOTE
Patient was in physiotherapy when had c/o of sob and some chest pain dr Katie Shine was here and did check patient vital signs ok pulse ox ok but md wanted 02 on which was done patient was on therapy mat sitting straight up did feel better patient taken back to his room ekg ordered and done also lab work ordered and done dr Katie Shine paged dr Lizzette Rayo  She did come see the patient looked at ekg and labs were back md was thinking that with his back to back therapies was too much due to his not doing much activitiy at home patient back on program

## 2018-11-01 NOTE — ASSESSMENT & PLAN NOTE
Denied any chest pain continue Lasix 40 mg p o   Daily continue metoprolol and lisinopril  Keep record of intake output  Daily weight at present time not in CHF

## 2018-11-01 NOTE — ASSESSMENT & PLAN NOTE
Follow by Cardiology had a cardiac catheterization no intervention done  Ejection [id number]  35-45  No evidence of CHF at present time neck pain not in go chest clear continue Lasix BNP stat done63 5 no evidence fluid overload  Continue present treatment

## 2018-11-01 NOTE — ASSESSMENT & PLAN NOTE
Follow by neurologist will need 2nd opinion patient does not have shoveling gait and  regitty also not present but facial impression more mass face  MRI done at [facility] review

## 2018-11-01 NOTE — CONSULTS
VTE Prophylaxis: Sequential compression device Mickiebam Molina)   / sequential compression device     Recommendations for Discharge:  · For [facility] rehab    Counseling / Coordination of Care Time: 45 minutes  Greater than 50% of total time spent on patient counseling and coordination of care  Collaboration of Care: Were Recommendations Directly Discussed with Primary Treatment Team? - Yes     History of Present Illness:    Angelika Wong is a 77 y o  male who is originally admitted to the [facility] rehab service due to unsteady gait  We are consulted for medical management due to multiple medical problems patient with history of AFib hypertension cardiomyopathy diabetes acute episode happened today rehab became acutely short of breath patient was seen right away +heart most probably secondary to over exertion patient is obese no evidence of acute congestive heart failure Cardiac Is status remained stable denied any chest vital was stable lungs was clear no fluid overload no evidence of infection patient became stable after rest denied any complaint  Review of Systems:    Review of Systems   Constitutional: Negative for appetite change, chills, fatigue and fever  HENT: Negative for hearing loss, sore throat and trouble swallowing  Eyes: Negative for photophobia, discharge and visual disturbance  Respiratory: Positive for shortness of breath  Negative for chest tightness  Cardiovascular: Negative for chest pain and palpitations  Gastrointestinal: Positive for constipation  Negative for abdominal pain, blood in stool and vomiting  Endocrine: Negative for polydipsia and polyuria  Genitourinary: Negative for difficulty urinating, dysuria, flank pain and hematuria  Musculoskeletal: Negative for back pain and gait problem  Skin: Negative for rash  Allergic/Immunologic: Negative for environmental allergies and food allergies  Neurological: Negative for dizziness, seizures, syncope and headaches  Hematological: Does not bruise/bleed easily  Psychiatric/Behavioral: Negative for behavioral problems  All other systems reviewed and are negative  Past Medical and Surgical History:     Past Medical History:   Diagnosis Date    A-fib ([address] )     Arthritis     Balance problems     sometimes    CHF (congestive heart failure) (HCC)     CPAP (continuous positive airway pressure) dependence     Diabetes mellitus (HCC)     Diabetic neuropathy (HCC)     bilat feet    Glaucoma     bilat    Gout     Hard of hearing     doesn t use his hearing aids    Hiatal hernia     History of total right knee replacement     History of total shoulder replacement     left    Hyperlipidemia     Hypertension     Knee pain, left     Knee pain, right     Obesity     Sleep apnea     Use of cane as ambulatory aid     Wears glasses        Past Surgical History:   Procedure Laterality Date    CARDIAC CATHETERIZATION      x2    CARDIAC CATHETERIZATION      COLONOSCOPY      COLONOSCOPY N/A 7/15/2016    Procedure: COLONOSCOPY;  Surgeon: Wilmer Crews MD;  Location: [facility];   Service:     EYE SURGERY      laser eye surgery    FOOT SURGERY Left     HIATAL HERNIA REPAIR      JOINT REPLACEMENT      left shoulder/right knee    DE REVISE KNEE JOINT REPLACE,1 PART Right 4/21/2017    Procedure: KNEE REVISION PARTIAL VERSUS COMPLETE REVISION;  Surgeon: Lesa Llamas MD;  Location: [facility];  Service: Orthopedics    WISDOM TOOTH EXTRACTION         Meds/Allergies:    all medications and allergies reviewed    Allergies: No Known Allergies    Social History:     Marital Status: /Civil Union    Substance Use History:   History   Alcohol Use    Yes     Comment: socially     History   Smoking Status    Never Smoker   Smokeless Tobacco    Never Used     History   Drug Use No       Family History:    non-contributory    Physical Exam:     Vitals:   Blood Pressure: 117/59 (11/01/18 0745)  Pulse: 72 (11/01/18 0745)  Temperature: (!) 95 9 °F (35 5 °C) (11/01/18 0745)  Temp Source: Temporal (11/01/18 0745)  Respirations: 18 (11/01/18 0745)  Height: 6' 5" (195 6 cm) (10/30/18 2045)  Weight - Scale: (!) 148 kg (325 lb 14 4 oz) (11/01/18 0638)  SpO2: 97 % (11/01/18 0745)    Physical Exam   Constitutional: He is oriented to person, place, and time  He appears well-developed and well-nourished  HENT:   Head: Normocephalic and atraumatic  Right Ear: External ear normal    Left Ear: External ear normal    Mouth/Throat: Oropharynx is clear and moist    Eyes: Pupils are equal, round, and reactive to light  Conjunctivae and EOM are normal    Neck: Normal range of motion  Neck supple  Cardiovascular: Normal rate, regular rhythm, normal heart sounds and intact distal pulses  Pulmonary/Chest: Effort normal and breath sounds normal    Abdominal: Soft  Bowel sounds are normal  He exhibits no mass  There is no tenderness  There is no rebound and no guarding  Obese   Genitourinary:   Genitourinary Comments: deferred   Musculoskeletal: Normal range of motion  Neurological: He is alert and oriented to person, place, and time  Slow to respond but alert orient back memory to give me all doctor's name most of medical information   Skin: Skin is warm and dry  No rash noted  On right toe small ecchymotic area size of dileep   Psychiatric: He has a normal mood and affect  Nursing note and vitals reviewed  Additional Data:     Lab Results: I have personally reviewed pertinent reports          Results from last 7 days  Lab Units 11/01/18  0630   WBC Thousand/uL 6 60   HEMOGLOBIN g/dL 11 4*   HEMATOCRIT % 33 9*   PLATELETS Thousands/uL 240       Results from last 7 days  Lab Units 11/01/18  0630   POTASSIUM mmol/L 3 8   CHLORIDE mmol/L 99   CO2 mmol/L 31*   BUN mg/dL 13   CREATININE mg/dL 0 65*   ANION GAP mmol/L 8   CALCIUM mg/dL 8 8   ALBUMIN g/dL 3 4   TOTAL BILIRUBIN mg/dL 1 00   ALK PHOS U/L 67   ALT U/L 28   AST U/L 18 Lab Results   Component Value Date/Time    HGBA1C 6 3 04/22/2017 05:51 AM       Results from last 7 days  Lab Units 11/01/18  1122 11/01/18  0622 10/31/18  2106 10/31/18  1609 10/31/18  1145 10/31/18  0949 10/31/18  0634 10/30/18  2117 10/30/18  1718   POC GLUCOSE mg/dl 188* 144* 191* 163* 140* 213* 128* 142* 142*           Imaging: I have personally reviewed pertinent reports  No orders to display       EKG, Pathology, and Other Studies Reviewed on Admission:   · EKG:  Normal sinus with bundle-branch block    ** Please Note: This note has been constructed using a voice recognition system  **    Roge Ocampo 1950, 77 y o  male MRN: [id number]    Unit/Bed#: Samantha Florez 269-02 Encounter: [id number]    Primary Care Provider: No primary care provider on file     Date and time admitted to hospital: 10/30/2018  4:37 PM      Consults    Dyspnea on exertion   Assessment & Plan    77year-old gentleman hospitalized at [facility] for rehab program after a fall admitted at [facility] due to unsteady gait transferred to [facility] for rehab program patient fall was accidental no syncope no major injury no fracture but he was unsteady on gait patient was workup prior to that for Parkinson's disease and follow by Neurology but no antiparkinson medication given patient here during physiotherapy he did at least 2 hr p  hysiotherapy while going from 1 place to another short of breath block was very short distance denied any dizziness no lightheaded no chest pain no other symptom no postural hypotension blood pressure 128/65 standing  122/65 mm sitting  130/68 standing pulse ox 95 on room exam was negative no fluid overload no shortness of breath time of exam lungs was clear EKG normal sinus rhythm with left bundle branch block no change from admission he had a long history of AFib but now in sinus rhythm on anti called relation heart rate well controlled 68 per minute all the lab reviewed all within normal range BNP normal  Patient does have a long history of drinking drink vadka 4-6 glass daily he 5 drinks almost half a gal on daily basis noted not on thiamine previously had MRI CT of the head and 2D echo  2D echo ejection fraction 35-40 range MRI of the brain did show some vascular ischemia but no acute distress   Most probably shortness of breath have came from over exertion patient is heavy and activity is very limited at with exercise he became short of breath but at present time no evidence of acute congestive heart failure  No change in treatment plan with continue same medication he is fully anticoagulated  And his symptoms all resolved continue to observe but he can continue physiotherapy       Chronic systolic CHF (congestive heart failure) ([facility])   Assessment & Plan    Denied any chest pain continue Lasix 40 mg p o   Daily continue metoprolol and lisinopril  Keep record of intake output  Daily weight at present time not in CHF     Hypertrophic cardiomyopathy [facility])   Assessment & Plan    Follow by Cardiology had a cardiac catheterization no intervention done  Ejection [id number]  35-45  No evidence of CHF at present time neck pain not in go chest clear continue Lasix BNP stat done63 5 no evidence fluid overload  Continue present treatment     Atrial fibrillation, chronic [facility])   Assessment & Plan    Patient with long-standing history of AFib had ablation done in [place] on 2 /18 then patient became had AFib again then cardioversion done now patient is still in sinus rhythm EKG yesterday and today sinus rhythm with rate 68 per minute bundle branch block patient is on amidrone and beta-blocker tolerating but also fully anticoagulated     Diabetes mellitus type 2 with neurological manifestations [facility])   Assessment & Plan    Lab Results   Component Value Date    HGBA1C 6 3 04/22/2017       Recent Labs      10/31/18   1609  10/31/18   2106  11/01/18   0622  11/01/18   1122   POCGLU  163*  191* 144*  188*       Blood Sugar Average: Last 72 hrs:  (P) 161 [id number]     HTN (hypertension)   Assessment & Plan    Blood pressure well controlled continue present medication number postural hypertension     Aspiration pneumonitis (HCC)   Assessment & Plan    Problem resolved no evidence of aspiration pneumonia at present time     Abnormal gait   Assessment & Plan    Patient was seen by neurologist for abnormal gait questionable Parkinson's diagnosis made no medication is started may consider 2nd opinion of Neurology see medication may be helpful Will discuss with Dr Dameon Jensen (on) (from) unspecified stairs and steps, sequela   Assessment & Plan    History of fall at home for went [facility] no acute injury multiple x-ray negative no loss of consciousness     * Parkinsonism [facility])   Assessment & Plan    Follow by neurologist will need 2nd opinion patient does not have shoveling gait and  regitty also not present but facial impression more mass face  MRI done at [facility] review

## 2018-11-02 LAB
ANION GAP SERPL CALCULATED.3IONS-SCNC: 9 MMOL/L (ref 5–14)
ATRIAL RATE: 67 BPM
ATRIAL RATE: 68 BPM
BUN SERPL-MCNC: 13 MG/DL (ref 5–25)
CALCIUM SERPL-MCNC: 9.1 MG/DL (ref 8.4–10.2)
CHLORIDE SERPL-SCNC: 99 MMOL/L (ref 97–108)
CO2 SERPL-SCNC: 30 MMOL/L (ref 22–30)
CREAT SERPL-MCNC: 0.6 MG/DL (ref 0.7–1.5)
GFR SERPL CREATININE-BSD FRML MDRD: 102 ML/MIN/1.73SQ M
GLUCOSE P FAST SERPL-MCNC: 112 MG/DL (ref 70–99)
GLUCOSE SERPL-MCNC: 112 MG/DL (ref 70–99)
GLUCOSE SERPL-MCNC: 114 MG/DL (ref 70–99)
GLUCOSE SERPL-MCNC: 122 MG/DL (ref 70–99)
GLUCOSE SERPL-MCNC: 136 MG/DL (ref 70–99)
GLUCOSE SERPL-MCNC: 153 MG/DL (ref 70–99)
GLUCOSE SERPL-MCNC: 154 MG/DL (ref 70–99)
MAGNESIUM SERPL-MCNC: 1.8 MG/DL (ref 1.6–2.3)
P AXIS: 47 DEGREES
P AXIS: 59 DEGREES
POTASSIUM SERPL-SCNC: 3.9 MMOL/L (ref 3.6–5)
PR INTERVAL: 220 MS
PR INTERVAL: 224 MS
QRS AXIS: -7 DEGREES
QRS AXIS: 11 DEGREES
QRSD INTERVAL: 180 MS
QRSD INTERVAL: 182 MS
QT INTERVAL: 510 MS
QT INTERVAL: 510 MS
QTC INTERVAL: 538 MS
QTC INTERVAL: 542 MS
SODIUM SERPL-SCNC: 138 MMOL/L (ref 137–147)
T WAVE AXIS: 155 DEGREES
T WAVE AXIS: 156 DEGREES
TSH SERPL DL<=0.05 MIU/L-ACNC: 2.84 UIU/ML (ref 0.47–4.68)
VENTRICULAR RATE: 67 BPM
VENTRICULAR RATE: 68 BPM

## 2018-11-02 PROCEDURE — 80048 BASIC METABOLIC PNL TOTAL CA: CPT | Performed by: INTERNAL MEDICINE

## 2018-11-02 PROCEDURE — 99304 1ST NF CARE SF/LOW MDM 25: CPT | Performed by: PSYCHIATRY & NEUROLOGY

## 2018-11-02 PROCEDURE — 83735 ASSAY OF MAGNESIUM: CPT | Performed by: INTERNAL MEDICINE

## 2018-11-02 PROCEDURE — 93010 ELECTROCARDIOGRAM REPORT: CPT | Performed by: INTERNAL MEDICINE

## 2018-11-02 PROCEDURE — 84443 ASSAY THYROID STIM HORMONE: CPT | Performed by: INTERNAL MEDICINE

## 2018-11-02 PROCEDURE — 82948 REAGENT STRIP/BLOOD GLUCOSE: CPT

## 2018-11-02 RX ADMIN — FOLIC ACID 1 MG: 1 TABLET ORAL at 08:06

## 2018-11-02 RX ADMIN — BRIMONIDINE TARTRATE 1 DROP: 1.5 SOLUTION OPHTHALMIC at 22:07

## 2018-11-02 RX ADMIN — LATANOPROST 1 DROP: 50 SOLUTION OPHTHALMIC at 22:06

## 2018-11-02 RX ADMIN — BRIMONIDINE TARTRATE 1 DROP: 1.5 SOLUTION OPHTHALMIC at 16:45

## 2018-11-02 RX ADMIN — THIAMINE HCL TAB 100 MG 100 MG: 100 TAB at 08:01

## 2018-11-02 RX ADMIN — METFORMIN HYDROCHLORIDE 1000 MG: 500 TABLET ORAL at 16:43

## 2018-11-02 RX ADMIN — BRIMONIDINE TARTRATE 1 DROP: 1.5 SOLUTION OPHTHALMIC at 08:07

## 2018-11-02 RX ADMIN — POTASSIUM CHLORIDE 20 MEQ: 750 TABLET, EXTENDED RELEASE ORAL at 08:04

## 2018-11-02 RX ADMIN — VITAMIN D, TAB 1000IU (100/BT) 1000 UNITS: 25 TAB at 08:03

## 2018-11-02 RX ADMIN — AMIODARONE HYDROCHLORIDE 200 MG: 200 TABLET ORAL at 08:03

## 2018-11-02 RX ADMIN — ATORVASTATIN CALCIUM 40 MG: 40 TABLET, FILM COATED ORAL at 18:07

## 2018-11-02 RX ADMIN — ACETAMINOPHEN 650 MG: 325 TABLET ORAL at 08:02

## 2018-11-02 RX ADMIN — FUROSEMIDE 40 MG: 40 TABLET ORAL at 08:02

## 2018-11-02 RX ADMIN — ASPIRIN 81 MG: 81 TABLET, COATED ORAL at 08:03

## 2018-11-02 RX ADMIN — METOPROLOL SUCCINATE 100 MG: 50 TABLET, EXTENDED RELEASE ORAL at 08:01

## 2018-11-02 RX ADMIN — TAMSULOSIN HYDROCHLORIDE 0.4 MG: 0.4 CAPSULE ORAL at 16:43

## 2018-11-02 RX ADMIN — CYANOCOBALAMIN TAB 1000 MCG 1000 MCG: 1000 TAB at 08:03

## 2018-11-02 RX ADMIN — INSULIN GLARGINE 18 UNITS: 100 INJECTION, SOLUTION SUBCUTANEOUS at 07:58

## 2018-11-02 RX ADMIN — POLYETHYLENE GLYCOL 3350 17 G: 17 POWDER, FOR SOLUTION ORAL at 08:01

## 2018-11-02 RX ADMIN — INSULIN LISPRO 1 UNITS: 100 INJECTION, SOLUTION INTRAVENOUS; SUBCUTANEOUS at 21:07

## 2018-11-02 RX ADMIN — METFORMIN HYDROCHLORIDE 1000 MG: 500 TABLET ORAL at 08:01

## 2018-11-02 RX ADMIN — Medication 1 TABLET: at 08:02

## 2018-11-02 RX ADMIN — MAGNESIUM OXIDE TAB 400 MG (241.3 MG ELEMENTAL MG) 400 MG: 400 (241.3 MG) TAB at 08:02

## 2018-11-02 RX ADMIN — LISINOPRIL 10 MG: 10 TABLET ORAL at 08:02

## 2018-11-02 RX ADMIN — ALLOPURINOL 300 MG: 300 TABLET ORAL at 08:03

## 2018-11-02 RX ADMIN — RIVAROXABAN 20 MG: 20 TABLET, FILM COATED ORAL at 16:43

## 2018-11-02 RX ADMIN — SENNOSIDES AND DOCUSATE SODIUM 1 TABLET: 8.6; 5 TABLET ORAL at 21:09

## 2018-11-02 NOTE — PROGRESS NOTES
Progress Note - Ruperto Higgins 1950, 77 y o  male MRN: [id number]    Unit/Bed#: Marizol Howard 269-02 Encounter: [id number]    Primary Care Provider: No primary care provider on file  Date and time admitted to hospital: 10/30/2018  4:37 PM        Dyspnea on exertion   Assessment & Plan    He had an episode of shortness of breath during physical therapy this morning  He reported increased effort of breathing but denied chest pain or shortness of breath or nausea or diaphoresis  During this time, the pulse oximetry was generally around 90%  The heart rate remained between 60 in 80  The blood pressure was in the acceptable ranges well  His symptoms gradually resolved with change in posture  He noted that he felt more short of breath when he was sitting upright and felt that his chest was being compressed by his abdominal bulk  He also felt that he was more short of breath lying flat when his abdominal bulk was compressing his chest   He felt comfortable at 45° elevation  I reviewed this issue with Dr Amy Graves from Internal Medicine  We agreed that in the absence of any significant pathology noted on EKG and lab work, we  believe that the symptoms were related to physical deconditioning and a close monitoring at this point will be appropriate  Chronic systolic CHF (congestive heart failure) ([facility] [address] )   Assessment & Plan    We will follow daily weights as well as symptoms and continue the current regimen  He had an episode of shortness of breath during activity and physical therapy  During this time, his pulse oximetry remained over 95% and his heart rate remained generally in the normal range as did his blood pressure he returns to comfortable reading after changes in posture  He continues on Lasix  Troponin today was negative  ProBNP was in the normal range as well  Hypertrophic cardiomyopathy ([facility] [address] )   Assessment & Plan    As above, cardiology management is ongoing    We will continue the current medication regimen as he is hemodynamically stable  Atrial fibrillation, chronic (HCC)   Assessment & Plan    I reviewed the progress note from the [facility] indicating that the patient should continue amiodarone and metoprolol as well as anticoagulation  He continues on these medications  EKG today indicates sinus rhythm with bundle branch block  He states that he has 2 cardiologists and is not sure Ramsey Milder is in charge"  I did discuss this with the primary care physician Dr Julio César Nogueira  The patient has the local cardiologist Dr Clara Anand who is directing his care  His cardiologist at [facility] was involved primarily for cardiac dysrhythmia and ablation procedure  Follow-up is scheduled  Diabetes mellitus type 2 with neurological manifestations [facility])   Assessment & Plan    Lab Results   Component Value Date    HGBA1C 6 3 04/22/2017     The Accu-Chek blood glucose values are variable  The patient was on a stable dose of 70/ 30 insulin, 2 times per day  He is currently on Lantus with coverage  I will review with Internal Medicine and plan to reestablish the patient's regimen of 2 times per day 70 /30 insulin if appropriate  I discussed this with the patient's primary care physician, Dr Julio César Nogueira, and he is in agreement  Recent Labs      10/31/18   2106  11/01/18   0622  11/01/18   1122  11/01/18   1605   POCGLU  191*  144*  188*  139*       Blood Sugar Average: Last 72 hrs:  (P) 159     HTN (hypertension)   Assessment & Plan    Blood pressure is adequate on the current regimen  Will continue to monitor the blood pressure  Internal Medicine is consulted for further blood pressure management  Aspiration pneumonitis [facility])   Assessment & Plan    He has evidence of pneumonitis on CT scan and aspiration on video swallow  Speech language pathology has been consulted for swallowing  He is on the modified liquids    He denies any difficulties with chewing and swallowing now   He hopes to advance past the thickened liquids soon  Abnormal gait   Assessment & Plan    As above, the patient's abnormal gait is likely multifactorial in nature and will likely benefit from rehabilitation therapies  Fall (on) (from) unspecified stairs and steps, sequela   Assessment & Plan    The patient has the abnormal gait, likely multifactorial with contributions from parkinsonism and diabetic peripheral polyneuropathy and physical deconditioning and cerebral microangiopathy, among others  He is an appropriate candidate for rehabilitation therapies to improve gait quality and safety  He needs to be independent with elevations at home  He currently needs assistance  We will proceed with a comprehensive rehabilitation program for gait assessment and improvement  * Parkinsonism [facility])   Assessment & Plan    He has the diagnosis of parkinsonism but is on no medications  I will spoke with the primary care physician who prefers to avoid starting Parkinson's medications now but rather utilizing therapy interventions to reduce his Parkinson's symptoms of abnormal gait and falls  I did contact the patient's neurologist and their office indicates that he is not in town but he will contact me in the near future when he returns  We will not institute any new medications at the present time  Subjective/Objective     Subjective:   He currently feels well  He had the episode of shortness of breath earlier today during physical therapy  This resolved with rest and change in position  Evaluation included EKG and blood work  Troponin and proBNP or within the normal range  D-dimer was mildly elevated but he is on anticoagulation and the pulse oximetry was generally around 95%  He denies any new bowel or bladder difficulties  He reports poor sleep because of the need to move in bed and the limitations of the bed  He slept better in the chair  At best was changed to address this issue  He reports adequate appetite  He denies any other new medical problems  Objective:  Vitals: Blood pressure 113/61, pulse 66, temperature 97 9 °F (36 6 °C), temperature source Temporal, resp  rate 20, height 6' 5" (1 956 m), weight (!) 148 kg (325 lb 14 4 oz), SpO2 94 %  ,Body mass index is 38 65 kg/m²  Intake/Output Summary (Last 24 hours) at 11/01/18 2114  Last data filed at 11/01/18 0007   Gross per 24 hour   Intake                0 ml   Output              100 ml   Net             -100 ml       Invasive Devices          No matching active lines, drains, or airways          Physical Exam: Labs and vital signs are noted  Head is normocephalic and atraumatic  The neck has functional range of motion without masses  Mucous membranes are moist without exudate or erythema  Cardiac rhythm is regular without significant ectopy  Lungs are clear in all fields without wheeze or rhonchi  Abdomen is soft and nontender and obese  Limb examination reveals no peripheral edema  Both calves are nontender  Range of motion is generally adequate except for tight flexors at the hips and the knees and the changes consistent with the Charcot  repair of the left foot  Neurologic examination continues remarkable for bradykinesia and diminished facial expression but no significant cogwheeling or tremor  Lab, Imaging and other studies: I have personally reviewed pertinent reports      VTE Pharmacologic Prophylaxis: Reason for no pharmacologic prophylaxis He is on Xarelto  VTE Mechanical Prophylaxis: reason for no mechanical VTE prophylaxis He is on Xarelto

## 2018-11-02 NOTE — ASSESSMENT & PLAN NOTE
I reviewed the progress note from the [facility] [place] indicating that the patient should continue amiodarone and metoprolol as well as anticoagulation  He continues on these medications  EKG today indicates sinus rhythm with bundle branch block  He states that he has 2 cardiologists and is not sure Debra Mason is in charge"  I did discuss this with the primary care physician Dr Yasmany Kinsey  The patient has the local cardiologist Dr Waldemar Carson who is directing his care  His cardiologist at [facility] was involved primarily for cardiac dysrhythmia and ablation procedure  Follow-up is scheduled

## 2018-11-02 NOTE — ASSESSMENT & PLAN NOTE
He has evidence of pneumonitis on CT scan and aspiration on video swallow  Speech language pathology has been consulted for swallowing  He is on the modified liquids  Vital-stim is used to improve swallow function  He denies any difficulties with chewing and swallowing now  He hopes to advance past the thickened liquids soon

## 2018-11-02 NOTE — ASSESSMENT & PLAN NOTE
As above, the patient's abnormal gait is likely multifactorial in nature and will likely benefit from rehabilitation therapies  Currently, he is most safe with the walker  He is not in favor of using this device  We will continue to work towards using the cane

## 2018-11-02 NOTE — ASSESSMENT & PLAN NOTE
He has the diagnosis of parkinsonism but is on no medications  I will spoke with the primary care physician who prefers to avoid starting Parkinson's medications now but rather utilizing therapy interventions to reduce his Parkinson's symptoms of abnormal gait and falls  I did contact the patient's neurologist and their office indicates that he is not in town but he will contact me in the near future when he returns  We will not institute any new medications at the present time  The patient is in agreement with [facility] neurology (Dr Imtiaz Darden) consult for another opinion

## 2018-11-02 NOTE — PROGRESS NOTES
Progress Note - Nathalia Skill 1950, 77 y o  male MRN: [id number]    Unit/Bed#: Dana Simpson 269-02 Encounter: [id number]    Primary Care Provider: No primary care provider on file  Date and time admitted to hospital: 10/30/2018  4:37 PM        Dyspnea on exertion   Assessment & Plan    He had an episode of shortness of breath during physical therapy November 1st  He reported increased effort of breathing but denied chest pain or shortness of breath or nausea or diaphoresis  During this time, the pulse oximetry was generally around 90%  The heart rate remained between 60 in 80  The blood pressure was in the acceptable ranges well  His symptoms gradually resolved with change in posture  He noted that he felt more short of breath when he was sitting upright and felt that his chest was being compressed by his abdominal bulk  He also felt that he was more short of breath lying flat when his abdominal bulk was compressing his chest   He felt comfortable at 45° elevation  I reviewed this issue with Dr Calvin Nice from Internal Medicine  We agreed that in the absence of any significant pathology noted on EKG and lab work, we  believe that the symptoms were related to physical deconditioning and a close monitoring at this point will be appropriate  Chronic systolic CHF (congestive heart failure) ([facility] [address] )   Assessment & Plan    We will follow daily weights as well as symptoms and continue the current regimen  He had an episode of shortness of breath during activity and physical therapy on November 1st   During this time, his pulse oximetry remained over 95% and his heart rate remained generally in the normal range as did his blood pressure  He  returns to comfortable breathing after changes in posture  He continues on Lasix  Troponin today was negative  ProBNP was in the normal range as well       Hypertrophic cardiomyopathy ([address] )   Assessment & Plan    As above, cardiology management is ongoing  We will continue the current medication regimen as he is hemodynamically stable  Atrial fibrillation, chronic ([address] )   Assessment & Plan    I reviewed the progress note from [facility] indicating that the patient should continue amiodarone and metoprolol as well as anticoagulation  He continues on these medications  EKG  indicates sinus rhythm with bundle branch block  He states that he has 2 cardiologists   I did discuss this with the primary care physician Dr Sudarshan Carroll  The patient has the local cardiologist Dr Sary Blake who is directing his care  His cardiologist at [facility] was involved primarily for cardiac dysrhythmia and ablation procedure  Follow-up is scheduled  I did contact the office of Dr Jose Marquez at [address] [place] [address] and made arrangements to fax the vital signs and medications and recent EKG to him  I advised that he could contact us with any further input  Diabetes mellitus type 2 with neurological manifestations [facility])   Assessment & Plan    Lab Results   Component Value Date    HGBA1C 6 3 04/22/2017     The Accu-Chek blood glucose values are variable  The patient was on a stable dose of 70/ 30 insulin, 2 times per day  He is currently on Lantus with coverage  I will review with Internal Medicine and plan to reestablish the patient's regimen of 2 times per day 70 /30 insulin if appropriate  I discussed this with the patient's primary care physician, Dr Sudarshan Carroll, and he is in agreement  Recent Labs      11/01/18   1122  11/01/18   1605  11/01/18   2124  11/02/18   0626   POCGLU  188*  139*  125*  122*       Blood Sugar Average: Last 72 hrs:  (P) 318 [id number]     HTN (hypertension)   Assessment & Plan    Blood pressure is adequate on the current regimen  Will continue to monitor the blood pressure  Internal Medicine is consulted for further blood pressure management       Aspiration pneumonitis [facility])   Assessment & Plan    He has evidence of pneumonitis on CT scan and aspiration on video swallow  Speech language pathology has been consulted for swallowing  He is on the modified liquids  Vital-stim is used to improve swallow function  He denies any difficulties with chewing and swallowing now  He hopes to advance past the thickened liquids soon  Abnormal gait   Assessment & Plan    As above, the patient's abnormal gait is likely multifactorial in nature and will likely benefit from rehabilitation therapies  Currently, he is most safe with the walker  He is not in favor of using this device  We will continue to work towards using the cane  Fall (on) (from) unspecified stairs and steps, sequela   Assessment & Plan    The patient has the abnormal gait, likely multifactorial with contributions from parkinsonism and diabetic peripheral polyneuropathy and physical deconditioning and cerebral microangiopathy, among others  He is an appropriate candidate for rehabilitation therapies to improve gait quality and safety  He needs to be independent with home making activities at home  He currently needs assistance  We will proceed with a comprehensive rehabilitation program for gait assessment and improvement  * Parkinsonism [facility])   Assessment & Plan    He has the diagnosis of parkinsonism but is on no medications  I will spoke with the primary care physician who prefers to avoid starting Parkinson's medications now but rather utilizing therapy interventions to reduce his Parkinson's symptoms of abnormal gait and falls  I did contact the patient's neurologist and their office indicates that he is not in town but he will contact me in the near future when he returns  We will not institute any new medications at the present time  The patient is in agreement with [facility] neurology (Dr Jl Zuniga) consult for another opinion  Subjective/Objective     Subjective:   He reports feeling well    He still has difficulty sleeping with the current bed  Appetite and voiding and bowel movements are adequate  He denies any chest pain or shortness of breath  He denies any new difficulties today including any other EENT, ophthalmological, cardiac, pulmonary, GI, , hematologic, dermatologic, psychiatric, neurologic, and musculoskeletal difficulties other than those consistent with the HPI and past medical history  Objective:  Vitals: Blood pressure 128/67, pulse 66, temperature (!) 97 2 °F (36 2 °C), temperature source Temporal, resp  rate 18, height 6' 5" (1 956 m), weight (!) 146 kg (321 lb), SpO2 95 %  ,Body mass index is 38 07 kg/m²  Intake/Output Summary (Last 24 hours) at 11/02/18 1114  Last data filed at 11/02/18 0115   Gross per 24 hour   Intake                0 ml   Output              150 ml   Net             -150 ml       Invasive Devices          No matching active lines, drains, or airways          Physical Exam: The vital signs are reviewed and are acceptable  Head is normocephalic and atraumatic  No masses are noted  Neck has functional range of motion  Mucous membranes moist   Cardiac rhythm is regular without ectopy  Lungs are clear without rhonchi or wheeze  Abdomen is obese and soft and nontender  Limb examination is remarkable for pulses present in the wrist and the feet  Both calves are nontender  There is no significant edema  Range of motion is generally functional but there is difficulty at both shoulders consistent with his previous surgery and degenerative changes  Neurologic examination is stable, remarkable for diminished face expression and bradykinesia  There is no rest tremor or cogwheel  Lab, Imaging and other studies: I have personally reviewed pertinent reports      VTE Pharmacologic Prophylaxis: Reason for no pharmacologic prophylaxis Xarelto  VTE Mechanical Prophylaxis: reason for no mechanical VTE prophylaxis Xarelto

## 2018-11-02 NOTE — ASSESSMENT & PLAN NOTE
Lab Results   Component Value Date    HGBA1C 6 3 04/22/2017     The Accu-Chek blood glucose values are variable  The patient was on a stable dose of 70/ 30 insulin, 2 times per day  He is currently on Lantus with coverage  I will review with Internal Medicine and plan to reestablish the patient's regimen of 2 times per day 70 /30 insulin if appropriate  I discussed this with the patient's primary care physician, Dr Ramon Daniels, and he is in agreement    Recent Labs      10/31/18   2106  11/01/18   0622  11/01/18   1122  11/01/18   1605   POCGLU  191*  144*  188*  139*       Blood Sugar Average: Last 72 hrs:  (P) 159

## 2018-11-02 NOTE — ASSESSMENT & PLAN NOTE
He has evidence of pneumonitis on CT scan and aspiration on video swallow  Speech language pathology has been consulted for swallowing  He is on the modified liquids  He denies any difficulties with chewing and swallowing now  He hopes to advance past the thickened liquids soon

## 2018-11-02 NOTE — ASSESSMENT & PLAN NOTE
We will follow daily weights as well as symptoms and continue the current regimen  He had an episode of shortness of breath during activity and physical therapy on November 1st   During this time, his pulse oximetry remained over 95% and his heart rate remained generally in the normal range as did his blood pressure  He  returns to comfortable breathing after changes in posture  He continues on Lasix  Troponin today was negative  ProBNP was in the normal range as well

## 2018-11-02 NOTE — PROGRESS NOTES
Pt  Tolerated session well with progress toward goals  He is resistant to RW use, stating that he can't use it at work, and it is too cumbersome to use in his home  Pt 's preference is to amb  Without any AD  During team rounds, team discussed that an AD will most likely be recommended at d/c  Tentative d/c date set for 11/9/18 11/02/18 0900   Pain Assessment   Pain Assessment No/denies pain   Diversional Activities Television; Other (Comment)  (electronics)   Toilet Transfer   Surface Assessed Raised Toilet   Transfer Technique Standard   Limitations Noted In Balance;Problem Solving; Safety   Adaptive Equipment Grab Bar   Findings ambulated to/from toilet with SPC and without AD with close sup -min  assist to guard balance  Amb  at fast pace, especially when urgent need to urinate, relies heavily on grab bar and begins sitting before being fully in front of toilet  Kitchen Mobility   Kitchen-Mobility Level Cane   Kitchen Activity Transport items   Kitchen Mobility Comments transported full cup of water table to/from counter with close sup -minimal assist to guard balance  Cues for motor control  Light Housekeeping   Light Housekeeping Level Cane   Rafat Less Housekeeping Level of Assistance Minimum assistance   Light Housekeeping grocery item transport in handled bags, placement of items on all height (except bottom shelf) shelves  Noted LOB with scissoring LLE crossing in front of RLE  Pt  impulsive with decreased problem solving for cane management to prop cane while using UEs for the activity  Pt  dropped cane 1X and impulsively bent down to  with assist to guard balance  Performed activity quickly without attention to accuracy with grocery placement     Communication   Communication (Pat  provides vague and inconsistent information)   Transfers   Sit to Stand 5  Supervision   Additional items Impulsive;Verbal cues   Stand to Sit 4  Minimal assistance   Additional items Impulsive;Verbal cues Cognition   Overall Cognitive Status Impaired   Attention Attends with cues to redirect   Orientation Level Oriented X4   Comments (delayed/slowed problem solving, flat affect )   OT Therapy Minutes   OT Time In 0900   OT Time Out 1000   OT Total Time (minutes) 60   OT Mode of treatment - Individual (minutes) (60)

## 2018-11-02 NOTE — PLAN OF CARE
DISCHARGE PLANNING     Discharge to home or other facility with appropriate resources Progressing        MUSCULOSKELETAL - ADULT     Maintain or return mobility to safest level of function Progressing     Maintain proper alignment of affected body part Progressing        PAIN - ADULT     Verbalizes/displays adequate comfort level or baseline comfort level Progressing        Potential for Falls     Patient will remain free of falls Progressing        Prexisting or High Potential for Compromised Skin Integrity     Skin integrity is maintained or improved Progressing        SAFETY ADULT     Maintain or return to baseline ADL function Progressing     Maintain or return mobility status to optimal level Progressing

## 2018-11-02 NOTE — ASSESSMENT & PLAN NOTE
The patient has the abnormal gait, likely multifactorial with contributions from parkinsonism and diabetic peripheral polyneuropathy and physical deconditioning and cerebral microangiopathy, among others  He is an appropriate candidate for rehabilitation therapies to improve gait quality and safety  He needs to be independent with home making activities at home  He currently needs assistance  We will proceed with a comprehensive rehabilitation program for gait assessment and improvement

## 2018-11-02 NOTE — ASSESSMENT & PLAN NOTE
He had an episode of shortness of breath during physical therapy November 1st  He reported increased effort of breathing but denied chest pain or shortness of breath or nausea or diaphoresis  During this time, the pulse oximetry was generally around 90%  The heart rate remained between 60 in 80  The blood pressure was in the acceptable ranges well  His symptoms gradually resolved with change in posture  He noted that he felt more short of breath when he was sitting upright and felt that his chest was being compressed by his abdominal bulk  He also felt that he was more short of breath lying flat when his abdominal bulk was compressing his chest   He felt comfortable at 45° elevation  I reviewed this issue with Dr Dylan Bales from Internal Medicine  We agreed that in the absence of any significant pathology noted on EKG and lab work, we  believe that the symptoms were related to physical deconditioning and a close monitoring at this point will be appropriate

## 2018-11-02 NOTE — CONSULTS
Consultation - Neurology   Devon Rubio 77 y o  male MRN: [id number]  Unit/Bed#: Les Philip 749-35 Encounter: [id number]      Assessment/Plan   Assessment:  1  Clearly apparent parkinsonian features, absent the traditional rest tremor  Given the asymmetry of findings very likely, as thought by earlier neurology evaluation, to be a primary parkinsonian process  However, given the reported small vessel ischemic changes on recent brain MRI could certainly have a potential cerebrovascular component as well  2  Given his appearance on examination, it would appear that his current degree of findings could certainly be delaying his progress in therapy  3  Historical underlying cardiac rhythm disturbance  Plan:  1  Would certainly consider here initiating Parkinson's treatment  However, before doing so, I would like to have Cardiology comment as to the safety of the medications used  2  Patient, in discussions, may be resistant to initiating treatment  Discussed with Dr Belkis Paredes, who does have a call into the patient's cardiologist to discuss the situation and potential medication use  Once accomplished, he will be getting back to Neurology  Neurology remains available to advise  Reason for Consultation:  Possible parkinsonism  HPI:   Otilia Duvall is a 77 y o  right handed male who is being seen with regard to the potential for a parkinsonian process that could well be interfering with his progress in rehab  Reports from Neurology evaluations at [facility] were reviewed  Those reports detail what appears to be a primary parkinsonian process with symptomatic issues when last seen not at a level suggesting the need for medication intervention  He is now at [facility] for aggressive inpatient rehabilitation  He has been noted to be very slow in actions and has had significant issues with balance      Patient himself feels as though his issues have been exaggerated in the aftermath of his extended in activity  TSH reviewed and normal     Report from a brain MRI performed in January of this year reviewed  Study demonstrated age-appropriate atrophy but also chronic ischemic small vessel change felt to be slightly advanced in comparison to earlier study  Review of Systems   Constitutional: Negative  HENT: Positive for trouble swallowing (aspirating thin liquids)  Eyes: Positive for visual disturbance (blurred vision (which is old))  Respiratory: Negative  Cardiovascular: Negative  Gastrointestinal: Positive for constipation  Endocrine: Negative  Genitourinary: Negative  Musculoskeletal: Positive for arthralgias  Skin: Negative for rash  Allergic/Immunologic: Negative  Neurological:        As above  Hematological: Bruises/bleeds easily (On Xarelto)  Psychiatric/Behavioral: Negative  Historical Information   Past Medical History:   Diagnosis Date    A[facility]fib ([facility] )     Arthritis     Balance problems     sometimes    [facility]est[facility]     CPAP (continuous positive airway pressure) dependence     Diabetes mellitus (HCC)     Diabetic neuropathy (HCC)     bilat feet    Glaucoma     bilat    Gout     Hard of hearing     doesn t use his hearing aids    Hiatal hernia     History of total right knee replacement     History of total shoulder replacement     left    Hyperlipidemia     Hypertension     Knee pain, left     Knee pain, right     Obesity     Sleep apnea     Use of cane as ambulatory aid     Wears glasses      Past Surgical History:   Procedure Laterality Date    CARDIAC CATHETERIZATION      x2    CARDIAC CATHETERIZATION      COLONOSCOPY      COLONOSCOPY N/A 7/15/2016    Procedure: COLONOSCOPY;  Surgeon: David Knowles MD;  Location: [facility];   Service:     EYE SURGERY      laser eye surgery    FOOT SURGERY Left     HIATAL HERNIA REPAIR      JOINT REPLACEMENT      left shoulder/right knee    [facility] REVISE KNEE JOINT REPLACE,1 PART Right 4/21/2017    Procedure: KNEE REVISION PARTIAL VERSUS COMPLETE REVISION;  Surgeon: Willian Pierre MD;  Location: [facility];  Service: Orthopedics    WISDOM TOOTH EXTRACTION       Social History    History reviewed  No pertinent family history  No Known Allergies      PTA meds:   Prior to Admission Medications   Prescriptions Last Dose Informant Patient Reported? Taking? Bimatoprost (LUMIGAN OP)   Yes No   Sig: Apply 1 drop to eye daily at bedtime Both eyes    Brimonidine Tartrate (ALPHAGAN P OP)   Yes No   Sig: Apply 1 drop to eye 3 (three) times a day Right eye   Cholecalciferol (VITAMIN D3) 3000 UNITS TABS   Yes No   Sig: Take 3,000 Units by mouth daily     Cyanocobalamin (VITAMIN B 12 PO)   Yes No   Sig: Take 1 tablet by mouth daily     Multiple Vitamin (MULTIVITAMIN) tablet   Yes No   Sig: Take 1 tablet by mouth daily  allopurinol (ZYLOPRIM) 300 mg tablet   Yes No   Sig: Take 300 mg by mouth daily  aspirin 81 MG tablet   Yes No   Sig: Take 81 mg by mouth daily   atorvastatin (LIPITOR) 40 mg tablet   Yes No   Sig: Take 40 mg by mouth every evening   folic acid (FOLVITE) 1 mg tablet   Yes No   Sig: Take 1 mg by mouth daily  furosemide (LASIX) 40 mg tablet   Yes No   Sig: Take 40 mg by mouth daily   insulin aspart protamine-insulin aspart (NovoLOG 70/30) 100 units/mL injection   Yes No   Sig: Inject 24 Units under the skin every morning   insulin aspart protamine-insulin aspart (NovoLOG 70/30) 100 units/mL injection   Yes No   Sig: Inject 16 Units under the skin every evening   lisinopril (ZESTRIL) 10 mg tablet   Yes No   Sig: Take 10 mg by mouth daily   magnesium oxide (MAG-OX) 400 mg   Yes No   Sig: Take 400 mg by mouth daily   metFORMIN (GLUCOPHAGE) 1000 MG tablet   Yes No   Sig: Take 1,000 mg by mouth 2 (two) times a day with meals     metoprolol succinate (TOPROL-XL) 50 mg 24 hr tablet   Yes No   Sig: Take 50 mg by mouth daily   potassium chloride (K-DUR,KLOR-CON) 20 mEq tablet Yes No   Sig: Take 20 mEq by mouth daily   rivaroxaban (XARELTO) 20 mg tablet   No No   Sig: Take 0 5 tablets by mouth daily with dinner for 7 days After 7 days resume your normal dose of 20 mg daily  tamsulosin (FLOMAX) 0 4 mg   No No   Sig: TAKE 1 CAPSULE TWICE A DAY      Facility-Administered Medications: None         Objective   Vitals:Blood pressure 128/67, pulse 66, temperature (!) 97 2 °F (36 2 °C), temperature source Temporal, resp  rate 18, height 6' 5" (1 956 m), weight (!) 146 kg (321 lb), SpO2 95 %  Physical Exam  Head normocephalic  Eyes nonicteric  Lungs clear to auscultation  Rhythm did appear to be regular today  GI (abdomen) soft nontender with bowel sounds present  Distal lower extremity edema noted  Neurologic Exam  Alert  Pleasantly interactive  Fully oriented  Voice extremely soft  Required assist to stand  When up he had no difficulty with gait ignition  His strides were not particularly short or shuffling, but he required the assist of one as he demonstrate definite issues with postural stability, exhibiting significant pulsive difficulties  Cranial Nerves:   I: Olfactory sense intact bilaterally  II: Visual fields full to confrontation  Pupils equal, round, reactive to light with normal accomodation  Fundus: with bilaterally marginated discs  III,IV,VI: Extraocular muscles EOMI, no nystagmus  V: Masseter and pterygoid strength  Sensation in the V1 through V3 distributions intact to pinprick and light touch bilaterally  VII: Face is symmetric with no weakness noted  VIII:  Impaired appreciation of finger rub bilaterally  IX/X: Uvula midline  Soft palate elevation symmetric  XI: Trapezius and SCM strength 5/5 bilaterally  XII: Tongue midline with no atrophy or fasciculations with appropriate movement  Accurate with finger-to-nose and heel-to-shin maneuvers bilaterally    Good symmetrical strength throughout the four extremities with no upper extremity drift   With sensory testing, patient related a diminished appreciation of pin in a stocking distribution to the mid calf bilaterally  Position sense appreciation appeared intact in the distal four extremities  Muscle stretch reflexes bilaterally one throughout the upper extremities, bilaterally trace at the knees and bilaterally absent at the ankles  Toe response is downgoing bilaterally  In addition to a soft voice, with extremely poor facial and general animation  Slow in all movements  Rest tone was mildly increased on the right with mild cogwheeling apparent at the right wrist   This increased with contralateral distraction maneuver  Rest tone on the left appeared normal   However, with contralateral distraction maneuver subtle tone increasing cogwheeling also became apparent at the wrist on the left  No typical rest tremor evident  However, he did have a subtle tremor of the outstretched upper extremities, more consistent with an essential or exaggerated physiologic tremor  Lab Results:   CBC:   Results from last 7 days  Lab Units 11/01/18  0630   WBC Thousand/uL 6 60   RBC Million/uL 3 54*   HEMOGLOBIN g/dL 11 4*   HEMATOCRIT % 33 9*   MCV fL 96   PLATELETS Thousands/uL 240   , BMP/CMP:   Results from last 7 days  Lab Units 11/02/18  0624 11/01/18  0630   POTASSIUM mmol/L 3 9 3 8   CHLORIDE mmol/L 99 99   CO2 mmol/L 30 31*   BUN mg/dL 13 13   CREATININE mg/dL 0 60* 0 65*   CALCIUM mg/dL 9 1 8 8   AST U/L  --  18   ALT U/L  --  28   ALK PHOS U/L  --  67   EGFR ml/min/1 73sq m 102 99   , TSH:   Results from last 7 days  Lab Units 11/02/18  0624   TSH 3RD GENERATON uIU/mL 2 840     Imaging Studies: I have personally reviewed pertinent reports        Counseling / Coordination of Care  I spent a total of 55 min with the patient with greater than 50% of that time spent counseling and coordinating his care, specifically discussing his diagnosis, additional tests, and discussing the case with his care team, as detailed above

## 2018-11-02 NOTE — ASSESSMENT & PLAN NOTE
He has the diagnosis of parkinsonism but is on no medications  I will spoke with the primary care physician who prefers to avoid starting Parkinson's medications now but rather utilizing therapy interventions to reduce his Parkinson's symptoms of abnormal gait and falls  I did contact the patient's neurologist and their office indicates that he is not in town but he will contact me in the near future when he returns  We will not institute any new medications at the present time

## 2018-11-02 NOTE — ASSESSMENT & PLAN NOTE
He had an episode of shortness of breath during physical therapy this morning  He reported increased effort of breathing but denied chest pain or shortness of breath or nausea or diaphoresis  During this time, the pulse oximetry was generally around 90%  The heart rate remained between 60 in 80  The blood pressure was in the acceptable ranges well  His symptoms gradually resolved with change in posture  He noted that he felt more short of breath when he was sitting upright and felt that his chest was being compressed by his abdominal bulk  He also felt that he was more short of breath lying flat when his abdominal bulk was compressing his chest   He felt comfortable at 45° elevation  I reviewed this issue with Dr Blanquita Purcell from Internal Medicine  We agreed that in the absence of any significant pathology noted on EKG and lab work, we  believe that the symptoms were related to physical deconditioning and a close monitoring at this point will be appropriate

## 2018-11-02 NOTE — PLAN OF CARE
Problem: MUSCULOSKELETAL - ADULT  Goal: Maintain or return mobility to safest level of function  INTERVENTIONS:  - Assess patient's ability to carry out ADLs; assess patient's baseline for ADL function and identify physical deficits which impact ability to perform ADLs (bathing, care of mouth/teeth, toileting, grooming, dressing, etc )  - Assess/evaluate cause of self-care deficits   - Assess range of motion  - Assess patient's mobility; develop plan if impaired  - Assess patient's need for assistive devices and provide as appropriate  - Encourage maximum independence but intervene and supervise when necessary  - Involve family in performance of ADLs  - Assess for home care needs following discharge   - Request OT consult to assist with ADL evaluation and planning for discharge  - Provide patient education as appropriate   Outcome: Progressing    Goal: Maintain proper alignment of affected body part  INTERVENTIONS:  - Support, maintain and protect limb and body alignment  - Provide pt/fam with appropriate education   Outcome: Progressing      Problem: PAIN - ADULT  Goal: Verbalizes/displays adequate comfort level or baseline comfort level  Interventions:  - Encourage patient to monitor pain and request assistance  - Assess pain using appropriate pain scale  - Administer analgesics based on type and severity of pain and evaluate response  - Implement non-pharmacological measures as appropriate and evaluate response  - Consider cultural and social influences on pain and pain management  - Notify physician/advanced practitioner if interventions unsuccessful or patient reports new pain   Outcome: Progressing      Problem: SAFETY ADULT  Goal: Maintain or return to baseline ADL function  INTERVENTIONS:  -  Assess patient's ability to carry out ADLs; assess patient's baseline for ADL function and identify physical deficits which impact ability to perform ADLs (bathing, care of mouth/teeth, toileting, grooming, dressing, etc )  - Assess/evaluate cause of self-care deficits   - Assess range of motion  - Assess patient's mobility; develop plan if impaired  - Assess patient's need for assistive devices and provide as appropriate  - Encourage maximum independence but intervene and supervise when necessary  ¯ Involve family in performance of ADLs  ¯ Assess for home care needs following discharge   ¯ Request OT consult to assist with ADL evaluation and planning for discharge  ¯ Provide patient education as appropriate   Outcome: Progressing    Goal: Maintain or return mobility status to optimal level  INTERVENTIONS:  - Assess patient's baseline mobility status (ambulation, transfers, stairs, etc )    - Identify cognitive and physical deficits and behaviors that affect mobility  - Identify mobility aids required to assist with transfers and/or ambulation (gait belt, sit-to-stand, lift, walker, cane, etc )  - [place] fall precautions as indicated by assessment  - Record patient progress and toleration of activity level on Mobility SBAR; progress patient to next Phase/Stage  - Instruct patient to call for assistance with activity based on assessment  - Request Rehabilitation consult to assist with strengthening/weightbearing, etc    Outcome: Progressing      Problem: DISCHARGE PLANNING  Goal: Discharge to home or other facility with appropriate resources  INTERVENTIONS:  - Identify barriers to discharge w/patient and caregiver  - Arrange for needed discharge resources and transportation as appropriate  - Identify discharge learning needs (meds, wound care, etc )  - Arrange for interpretive services to assist at discharge as needed  - Refer to Case Management Department for coordinating discharge planning if the patient needs post-hospital services based on physician/advanced practitioner order or complex needs related to functional status, cognitive ability, or social support system   Outcome: Progressing      Problem: Prexisting or High Potential for Compromised Skin Integrity  Goal: Skin integrity is maintained or improved  INTERVENTIONS:  - Identify patients at risk for skin breakdown  - Assess and monitor skin integrity  - Assess and monitor nutrition and hydration status  - Monitor labs (i e  albumin)  - Assess for incontinence   - Turn and reposition patient  - Assist with mobility/ambulation  - Relieve pressure over bony prominences  - Avoid friction and shearing  - Provide appropriate hygiene as needed including keeping skin clean and dry  - Evaluate need for skin moisturizer/barrier cream  - Collaborate with interdisciplinary team (i e  Nutrition, Rehabilitation, etc )   - Patient/family teaching   Outcome: Progressing

## 2018-11-02 NOTE — ASSESSMENT & PLAN NOTE
Lab Results   Component Value Date    HGBA1C 6 3 04/22/2017     The Accu-Chek blood glucose values are variable  The patient was on a stable dose of 70/ 30 insulin, 2 times per day  He is currently on Lantus with coverage  I will review with Internal Medicine and plan to reestablish the patient's regimen of 2 times per day 70 /30 insulin if appropriate  I discussed this with the patient's primary care physician, Dr Geovanni Gregorio, and he is in agreement    Recent Labs      11/01/18   1122  11/01/18   1605  11/01/18   2124  11/02/18   0626   POCGLU  188*  139*  125*  122*       Blood Sugar Average: Last 72 hrs:  (P) 382 [id number]

## 2018-11-02 NOTE — ASSESSMENT & PLAN NOTE
I reviewed the progress note from [facility] indicating that the patient should continue amiodarone and metoprolol as well as anticoagulation  He continues on these medications  EKG  indicates sinus rhythm with bundle branch block  He states that he has 2 cardiologists   I did discuss this with the primary care physician Dr Shadi Silva  The patient has the local cardiologist Dr Bob Choi who is directing his care  His cardiologist at [facility] was involved primarily for cardiac dysrhythmia and ablation procedure  Follow-up is scheduled  I did contact the office of Dr Matty Ramos at [address] [place] [address] and made arrangements to fax the vital signs and medications and recent EKG to him  I advised that he could contact us with any further input

## 2018-11-03 ENCOUNTER — DOCUMENTATION (OUTPATIENT)
Dept: INPATIENT UNIT | Facility: HOSPITAL | Age: 68
End: 2018-11-03

## 2018-11-03 LAB
GLUCOSE SERPL-MCNC: 120 MG/DL (ref 70–99)
GLUCOSE SERPL-MCNC: 121 MG/DL (ref 70–99)
GLUCOSE SERPL-MCNC: 155 MG/DL (ref 70–99)

## 2018-11-03 PROCEDURE — 82948 REAGENT STRIP/BLOOD GLUCOSE: CPT

## 2018-11-03 RX ADMIN — BRIMONIDINE TARTRATE 1 DROP: 1.5 SOLUTION OPHTHALMIC at 21:16

## 2018-11-03 RX ADMIN — SENNOSIDES AND DOCUSATE SODIUM 1 TABLET: 8.6; 5 TABLET ORAL at 21:15

## 2018-11-03 RX ADMIN — TAMSULOSIN HYDROCHLORIDE 0.4 MG: 0.4 CAPSULE ORAL at 16:52

## 2018-11-03 RX ADMIN — BRIMONIDINE TARTRATE 1 DROP: 1.5 SOLUTION OPHTHALMIC at 08:34

## 2018-11-03 RX ADMIN — FOLIC ACID 1 MG: 1 TABLET ORAL at 08:32

## 2018-11-03 RX ADMIN — POTASSIUM CHLORIDE 20 MEQ: 750 TABLET, EXTENDED RELEASE ORAL at 08:32

## 2018-11-03 RX ADMIN — Medication 1 TABLET: at 08:32

## 2018-11-03 RX ADMIN — ATORVASTATIN CALCIUM 40 MG: 40 TABLET, FILM COATED ORAL at 17:23

## 2018-11-03 RX ADMIN — LATANOPROST 1 DROP: 50 SOLUTION OPHTHALMIC at 21:16

## 2018-11-03 RX ADMIN — AMIODARONE HYDROCHLORIDE 200 MG: 200 TABLET ORAL at 08:32

## 2018-11-03 RX ADMIN — FUROSEMIDE 40 MG: 40 TABLET ORAL at 08:40

## 2018-11-03 RX ADMIN — ALLOPURINOL 300 MG: 300 TABLET ORAL at 08:32

## 2018-11-03 RX ADMIN — METFORMIN HYDROCHLORIDE 1000 MG: 500 TABLET ORAL at 08:32

## 2018-11-03 RX ADMIN — MAGNESIUM OXIDE TAB 400 MG (241.3 MG ELEMENTAL MG) 400 MG: 400 (241.3 MG) TAB at 08:32

## 2018-11-03 RX ADMIN — POLYETHYLENE GLYCOL 3350 17 G: 17 POWDER, FOR SOLUTION ORAL at 08:34

## 2018-11-03 RX ADMIN — RIVAROXABAN 20 MG: 20 TABLET, FILM COATED ORAL at 16:52

## 2018-11-03 RX ADMIN — METOPROLOL SUCCINATE 100 MG: 50 TABLET, EXTENDED RELEASE ORAL at 08:41

## 2018-11-03 RX ADMIN — BRIMONIDINE TARTRATE 1 DROP: 1.5 SOLUTION OPHTHALMIC at 16:53

## 2018-11-03 RX ADMIN — VITAMIN D, TAB 1000IU (100/BT) 1000 UNITS: 25 TAB at 08:32

## 2018-11-03 RX ADMIN — LISINOPRIL 10 MG: 10 TABLET ORAL at 08:40

## 2018-11-03 RX ADMIN — THIAMINE HCL TAB 100 MG 100 MG: 100 TAB at 08:32

## 2018-11-03 RX ADMIN — INSULIN LISPRO 1 UNITS: 100 INJECTION, SOLUTION INTRAVENOUS; SUBCUTANEOUS at 12:02

## 2018-11-03 RX ADMIN — METFORMIN HYDROCHLORIDE 1000 MG: 500 TABLET ORAL at 16:52

## 2018-11-03 RX ADMIN — CYANOCOBALAMIN TAB 1000 MCG 1000 MCG: 1000 TAB at 08:32

## 2018-11-03 RX ADMIN — INSULIN GLARGINE 18 UNITS: 100 INJECTION, SOLUTION SUBCUTANEOUS at 08:33

## 2018-11-03 RX ADMIN — ASPIRIN 81 MG: 81 TABLET, COATED ORAL at 08:32

## 2018-11-03 NOTE — PROGRESS NOTES
Progress Note - Meri Lombard 77 y o  male MRN: [id number]    Unit/Bed#: PALMA 269-02 Encounter: [id number]      Assessment:       Parkinsonism ([facility] [address] )   Failed total right knee replacement (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)   Atrial fibrillation, chronic (HCC)   Hypertrophic cardiomyopathy (HCC)   Chronic systolic CHF (congestive heart failure) (HCC)   Dyspnea on exertion     Plan:  Case discussed with nursing and rehabilitation team  Continue comprehensive inpatient multidisciplinary rehabilitation program        Medication Dose/Rate, Route, Frequency   allopurinol (ZYLOPRIM) tablet 300 mg 300 mg, PO, Daily   amiodarone tablet 200 mg 200 mg, PO, Daily With Breakfast   aspirin (ECOTRIN LOW STRENGTH) EC tablet 81 mg 81 mg, PO, Daily   atorvastatin (LIPITOR) tablet 40 mg 40 mg, PO, QPM   brimonidine (ALPHAGAN P) 0 15 % ophthalmic solution 1 drop 1 drop, RIGHT EYE, TID   cholecalciferol (VITAMIN D3) tablet 1,000 Units 1,000 Units, PO, Daily   cyanocobalamin (VITAMIN B-12) tablet 1,000 mcg 1,000 mcg, PO, Daily   folic acid (FOLVITE) tablet 1 mg 1 mg, PO, Daily   furosemide (LASIX) tablet 40 mg 40 mg, PO, Daily   insulin glargine (LANTUS) subcutaneous injection 18 Units 0 18 mL 18 Units, SC, Daily With Breakfast   insulin lispro (HumaLOG) 100 units/mL subcutaneous injection 1-5 Units 1 Units, SC, HS   insulin lispro (HumaLOG) 100 units/mL subcutaneous injection 1-5 Units 1 Units, SC, TID AC   latanoprost (XALATAN) 0 005 % ophthalmic solution 1 drop 1 drop, Both Eyes, HS   lisinopril (ZESTRIL) tablet 10 mg 10 mg, PO, Daily   magnesium oxide (MAG-OX) tablet 400 mg 400 mg, PO, Daily   metFORMIN (GLUCOPHAGE) tablet 1,000 mg 1,000 mg, PO, BID With Meals   metoprolol succinate (TOPROL-XL) 24 hr tablet 100 mg 100 mg, PO, Daily   multivitamin-minerals (CENTRUM) tablet 1 tablet 1 tablet, PO, Daily   polyethylene glycol (MIRALAX) packet 17 g 17 g, PO, Daily   potassium chloride (K-DUR,KLOR-CON) CR tablet 20 mEq 20 mEq, PO, Daily   rivaroxaban (XARELTO) tablet 20 mg 20 mg, PO, Daily With Dinner   senna-docusate sodium (SENOKOT S) 8 6-50 mg per tablet 1 tablet 1 tablet, PO, HS   tamsulosin (FLOMAX) capsule 0 4 mg 0 4 mg, PO, Daily With Dinner   thiamine (VITAMIN B1) tablet 100 mg 100 mg, PO, Daily      PRN     Medication Dose/Rate, Route, Frequency   acetaminophen (TYLENOL) tablet 650 mg 650 mg, PO, Q6H PRN        Subjective:   Denied complaints    Objective:     Vitals: Blood pressure 133/70, pulse 57, temperature 98 2 °F (36 8 °C), temperature source Temporal, resp  rate 18, height 6' 5" (1 956 m), weight (!) 146 kg (321 lb), SpO2 96 %  ,Body mass index is 38 07 kg/m²  Intake/Output Summary (Last 24 hours) at 11/03/18 0821  Last data filed at 11/03/18 0541   Gross per 24 hour   Intake                0 ml   Output              100 ml   Net             -100 ml       Physical Exam:   The vital signs are reviewed and are acceptable  Adult white male in no acute distress  Head is normocephalic and atraumatic  No masses are noted  Neck has functional range of motion  Mucous membranes moist     Cardiac rhythm is regular without ectopy  Lungs are clear without rhonchi or wheeze  Normal respiratory rate and effort    Abdomen is obese and soft and nontender  Limb examination is remarkable for pulses present in the wrist and the feet  Both calves are nontender  There is no significant edema  Range of motion is generally functional but there is difficulty at both shoulders consistent with his previous surgery and degenerative changes  Neurologic examination is stable, remarkable for diminished face expression and bradykinesia  There is no rest tremor or cogwheel

## 2018-11-03 NOTE — NURSING NOTE
Patient c/o SOB in lying position  Raised HOB, VSS 37 2 58, 20,  133/70, RA 96%  Per patient feels a little better  Transferred to recliner chair with minAx1 with SPC unsteady gait, denies dizziness, SOB at this time  Will continue to monitor

## 2018-11-04 LAB
GLUCOSE SERPL-MCNC: 114 MG/DL (ref 70–99)
GLUCOSE SERPL-MCNC: 132 MG/DL (ref 70–99)
GLUCOSE SERPL-MCNC: 134 MG/DL (ref 70–99)
GLUCOSE SERPL-MCNC: 135 MG/DL (ref 70–99)

## 2018-11-04 PROCEDURE — 82948 REAGENT STRIP/BLOOD GLUCOSE: CPT

## 2018-11-04 RX ADMIN — BRIMONIDINE TARTRATE 1 DROP: 1.5 SOLUTION OPHTHALMIC at 17:03

## 2018-11-04 RX ADMIN — POLYETHYLENE GLYCOL 3350 17 G: 17 POWDER, FOR SOLUTION ORAL at 08:22

## 2018-11-04 RX ADMIN — VITAMIN D, TAB 1000IU (100/BT) 1000 UNITS: 25 TAB at 08:22

## 2018-11-04 RX ADMIN — ATORVASTATIN CALCIUM 40 MG: 40 TABLET, FILM COATED ORAL at 18:02

## 2018-11-04 RX ADMIN — Medication 1 TABLET: at 08:22

## 2018-11-04 RX ADMIN — SENNOSIDES AND DOCUSATE SODIUM 1 TABLET: 8.6; 5 TABLET ORAL at 21:00

## 2018-11-04 RX ADMIN — MAGNESIUM OXIDE TAB 400 MG (241.3 MG ELEMENTAL MG) 400 MG: 400 (241.3 MG) TAB at 08:22

## 2018-11-04 RX ADMIN — THIAMINE HCL TAB 100 MG 100 MG: 100 TAB at 08:22

## 2018-11-04 RX ADMIN — METOPROLOL SUCCINATE 100 MG: 50 TABLET, EXTENDED RELEASE ORAL at 08:25

## 2018-11-04 RX ADMIN — ASPIRIN 81 MG: 81 TABLET, COATED ORAL at 08:22

## 2018-11-04 RX ADMIN — AMIODARONE HYDROCHLORIDE 200 MG: 200 TABLET ORAL at 08:22

## 2018-11-04 RX ADMIN — TAMSULOSIN HYDROCHLORIDE 0.4 MG: 0.4 CAPSULE ORAL at 17:04

## 2018-11-04 RX ADMIN — ALLOPURINOL 300 MG: 300 TABLET ORAL at 08:22

## 2018-11-04 RX ADMIN — LATANOPROST 1 DROP: 50 SOLUTION OPHTHALMIC at 21:00

## 2018-11-04 RX ADMIN — POTASSIUM CHLORIDE 20 MEQ: 750 TABLET, EXTENDED RELEASE ORAL at 08:22

## 2018-11-04 RX ADMIN — METFORMIN HYDROCHLORIDE 1000 MG: 500 TABLET ORAL at 17:04

## 2018-11-04 RX ADMIN — FOLIC ACID 1 MG: 1 TABLET ORAL at 08:22

## 2018-11-04 RX ADMIN — METFORMIN HYDROCHLORIDE 1000 MG: 500 TABLET ORAL at 08:22

## 2018-11-04 RX ADMIN — FUROSEMIDE 40 MG: 40 TABLET ORAL at 08:22

## 2018-11-04 RX ADMIN — BRIMONIDINE TARTRATE 1 DROP: 1.5 SOLUTION OPHTHALMIC at 21:00

## 2018-11-04 RX ADMIN — BRIMONIDINE TARTRATE 1 DROP: 1.5 SOLUTION OPHTHALMIC at 08:31

## 2018-11-04 RX ADMIN — CYANOCOBALAMIN TAB 1000 MCG 1000 MCG: 1000 TAB at 08:22

## 2018-11-04 RX ADMIN — LISINOPRIL 10 MG: 10 TABLET ORAL at 08:25

## 2018-11-04 RX ADMIN — INSULIN GLARGINE 18 UNITS: 100 INJECTION, SOLUTION SUBCUTANEOUS at 08:25

## 2018-11-04 RX ADMIN — RIVAROXABAN 20 MG: 20 TABLET, FILM COATED ORAL at 17:04

## 2018-11-05 LAB
GLUCOSE SERPL-MCNC: 103 MG/DL (ref 70–99)
GLUCOSE SERPL-MCNC: 104 MG/DL (ref 70–99)
GLUCOSE SERPL-MCNC: 120 MG/DL (ref 70–99)
GLUCOSE SERPL-MCNC: 98 MG/DL (ref 70–99)

## 2018-11-05 PROCEDURE — 82948 REAGENT STRIP/BLOOD GLUCOSE: CPT

## 2018-11-05 RX ORDER — INSULIN ASPART 100 [IU]/ML
18 INJECTION, SUSPENSION SUBCUTANEOUS
Status: DISCONTINUED | OUTPATIENT
Start: 2018-11-06 | End: 2018-11-09 | Stop reason: HOSPADM

## 2018-11-05 RX ADMIN — METFORMIN HYDROCHLORIDE 1000 MG: 500 TABLET ORAL at 08:49

## 2018-11-05 RX ADMIN — BRIMONIDINE TARTRATE 1 DROP: 1.5 SOLUTION OPHTHALMIC at 16:57

## 2018-11-05 RX ADMIN — LATANOPROST 1 DROP: 50 SOLUTION OPHTHALMIC at 21:28

## 2018-11-05 RX ADMIN — ACETAMINOPHEN 650 MG: 325 TABLET ORAL at 23:52

## 2018-11-05 RX ADMIN — FOLIC ACID 1 MG: 1 TABLET ORAL at 08:35

## 2018-11-05 RX ADMIN — METOPROLOL SUCCINATE 100 MG: 50 TABLET, EXTENDED RELEASE ORAL at 08:34

## 2018-11-05 RX ADMIN — ALLOPURINOL 300 MG: 300 TABLET ORAL at 08:36

## 2018-11-05 RX ADMIN — FUROSEMIDE 40 MG: 40 TABLET ORAL at 08:36

## 2018-11-05 RX ADMIN — VITAMIN D, TAB 1000IU (100/BT) 1000 UNITS: 25 TAB at 08:35

## 2018-11-05 RX ADMIN — BRIMONIDINE TARTRATE 1 DROP: 1.5 SOLUTION OPHTHALMIC at 08:38

## 2018-11-05 RX ADMIN — INSULIN GLARGINE 18 UNITS: 100 INJECTION, SOLUTION SUBCUTANEOUS at 08:40

## 2018-11-05 RX ADMIN — AMIODARONE HYDROCHLORIDE 200 MG: 200 TABLET ORAL at 08:37

## 2018-11-05 RX ADMIN — SENNOSIDES AND DOCUSATE SODIUM 1 TABLET: 8.6; 5 TABLET ORAL at 21:28

## 2018-11-05 RX ADMIN — ATORVASTATIN CALCIUM 40 MG: 40 TABLET, FILM COATED ORAL at 18:32

## 2018-11-05 RX ADMIN — THIAMINE HCL TAB 100 MG 100 MG: 100 TAB at 08:34

## 2018-11-05 RX ADMIN — CYANOCOBALAMIN TAB 1000 MCG 1000 MCG: 1000 TAB at 08:36

## 2018-11-05 RX ADMIN — ACETAMINOPHEN 650 MG: 325 TABLET ORAL at 03:15

## 2018-11-05 RX ADMIN — LISINOPRIL 10 MG: 10 TABLET ORAL at 08:49

## 2018-11-05 RX ADMIN — RIVAROXABAN 20 MG: 20 TABLET, FILM COATED ORAL at 16:58

## 2018-11-05 RX ADMIN — TAMSULOSIN HYDROCHLORIDE 0.4 MG: 0.4 CAPSULE ORAL at 16:56

## 2018-11-05 RX ADMIN — METFORMIN HYDROCHLORIDE 1000 MG: 500 TABLET ORAL at 16:56

## 2018-11-05 RX ADMIN — MAGNESIUM OXIDE TAB 400 MG (241.3 MG ELEMENTAL MG) 400 MG: 400 (241.3 MG) TAB at 08:34

## 2018-11-05 RX ADMIN — Medication 1 TABLET: at 08:34

## 2018-11-05 RX ADMIN — BRIMONIDINE TARTRATE 1 DROP: 1.5 SOLUTION OPHTHALMIC at 21:28

## 2018-11-05 RX ADMIN — ASPIRIN 81 MG: 81 TABLET, COATED ORAL at 08:36

## 2018-11-05 RX ADMIN — POTASSIUM CHLORIDE 20 MEQ: 750 TABLET, EXTENDED RELEASE ORAL at 08:34

## 2018-11-05 NOTE — ASSESSMENT & PLAN NOTE
He has evidence of pneumonitis on CT scan and aspiration on video swallow  Speech language pathology has been consulted for swallowing  He is on the modified liquids  Vital-stim is used to improve swallow function  Speech therapy indicates that they are contacting the other hospital to obtain records to optimize care  He denies any difficulties with chewing and swallowing now  He hopes to advance past the thickened liquids soon

## 2018-11-05 NOTE — ASSESSMENT & PLAN NOTE
He had an episode of shortness of breath during physical therapy November 1st  He reported increased effort of breathing but denied chest pain or shortness of breath or nausea or diaphoresis  During this time, the pulse oximetry was generally around 90%  The heart rate remained between 60 in 80  The blood pressure was in the acceptable ranges well  His symptoms gradually resolved with change in posture  He noted that he felt more short of breath when he was sitting upright and felt that his chest was being compressed by his abdominal bulk  He also felt that he was more short of breath lying flat when his abdominal bulk was compressing his chest   He felt comfortable at 45° elevation  I reviewed this issue with Dr Tyesha Ash from Internal Medicine  We agreed that in the absence of any significant pathology noted on EKG and lab work, we  believe that the symptoms were related to physical deconditioning and a close monitoring at this point will be appropriate

## 2018-11-05 NOTE — ASSESSMENT & PLAN NOTE
He has the diagnosis of parkinsonism but is on no medications  I will spoke with the primary care physician who prefers to avoid starting Parkinson's medications now but rather utilizing therapy interventions to reduce his Parkinson's symptoms of abnormal gait and falls  I did contact the patient's neurologist and their office indicates that he is not in town but he will contact me in the near future when he returns  We will not institute any new medications at the present time  Dr Jl Zuniga of Neurology saw the patient and identifies the Parkinson's symptoms  He suggests that medications may be helpful but also notes the potential adverse impact on cardiac rhythm  The patient prefers no medicines now

## 2018-11-05 NOTE — ASSESSMENT & PLAN NOTE
I reviewed the progress note from [facility] indicating that the patient should continue amiodarone and metoprolol as well as anticoagulation  He continues on these medications  EKG  indicates sinus rhythm with bundle branch block  He states that he has 2 cardiologists   I did discuss this with the primary care physician Dr Palomo Levy  The patient has the local cardiologist Dr Concha Duenas who is directing his care  His cardiologist at [facility] was involved primarily for cardiac dysrhythmia and ablation procedure  I contacted both Cardiology offices and asked him to contact me with any further input

## 2018-11-05 NOTE — PLAN OF CARE
Problem: Potential for Falls  Goal: Patient will remain free of falls  INTERVENTIONS:  - Assess patient frequently for physical needs  -  Identify cognitive and physical deficits and behaviors that affect risk of falls    -  [place] fall precautions as indicated by assessment   - Educate patient/family on patient safety including physical limitations  - Instruct patient to call for assistance with activity based on assessment  - Modify environment to reduce risk of injury  - Consider OT/PT consult to assist with strengthening/mobility   Outcome: Progressing      Problem: MUSCULOSKELETAL - ADULT  Goal: Maintain or return mobility to safest level of function  INTERVENTIONS:  - Assess patient's ability to carry out ADLs; assess patient's baseline for ADL function and identify physical deficits which impact ability to perform ADLs (bathing, care of mouth/teeth, toileting, grooming, dressing, etc )  - Assess/evaluate cause of self-care deficits   - Assess range of motion  - Assess patient's mobility; develop plan if impaired  - Assess patient's need for assistive devices and provide as appropriate  - Encourage maximum independence but intervene and supervise when necessary  - Involve family in performance of ADLs  - Assess for home care needs following discharge   - Request OT consult to assist with ADL evaluation and planning for discharge  - Provide patient education as appropriate   Outcome: Progressing    Goal: Maintain proper alignment of affected body part  INTERVENTIONS:  - Support, maintain and protect limb and body alignment  - Provide pt/fam with appropriate education   Outcome: Progressing      Problem: PAIN - ADULT  Goal: Verbalizes/displays adequate comfort level or baseline comfort level  Interventions:  - Encourage patient to monitor pain and request assistance  - Assess pain using appropriate pain scale  - Administer analgesics based on type and severity of pain and evaluate response  - Implement non-pharmacological measures as appropriate and evaluate response  - Consider cultural and social influences on pain and pain management  - Notify physician/advanced practitioner if interventions unsuccessful or patient reports new pain   Outcome: Progressing      Problem: SAFETY ADULT  Goal: Maintain or return to baseline ADL function  INTERVENTIONS:  -  Assess patient's ability to carry out ADLs; assess patient's baseline for ADL function and identify physical deficits which impact ability to perform ADLs (bathing, care of mouth/teeth, toileting, grooming, dressing, etc )  - Assess/evaluate cause of self-care deficits   - Assess range of motion  - Assess patient's mobility; develop plan if impaired  - Assess patient's need for assistive devices and provide as appropriate  - Encourage maximum independence but intervene and supervise when necessary  ¯ Involve family in performance of ADLs  ¯ Assess for home care needs following discharge   ¯ Request OT consult to assist with ADL evaluation and planning for discharge  ¯ Provide patient education as appropriate   Outcome: Progressing    Goal: Maintain or return mobility status to optimal level  INTERVENTIONS:  - Assess patient's baseline mobility status (ambulation, transfers, stairs, etc )    - Identify cognitive and physical deficits and behaviors that affect mobility  - Identify mobility aids required to assist with transfers and/or ambulation (gait belt, sit-to-stand, lift, walker, cane, etc )  - [place] fall precautions as indicated by assessment  - Record patient progress and toleration of activity level on Mobility SBAR; progress patient to next Phase/Stage  - Instruct patient to call for assistance with activity based on assessment  - Request Rehabilitation consult to assist with strengthening/weightbearing, etc    Outcome: Progressing      Problem: DISCHARGE PLANNING  Goal: Discharge to home or other facility with appropriate resources  INTERVENTIONS:  - Identify barriers to discharge w/patient and caregiver  - Arrange for needed discharge resources and transportation as appropriate  - Identify discharge learning needs (meds, wound care, etc )  - Arrange for interpretive services to assist at discharge as needed  - Refer to Case Management Department for coordinating discharge planning if the patient needs post-hospital services based on physician/advanced practitioner order or complex needs related to functional status, cognitive ability, or social support system   Outcome: Progressing      Problem: Prexisting or High Potential for Compromised Skin Integrity  Goal: Skin integrity is maintained or improved  INTERVENTIONS:  - Identify patients at risk for skin breakdown  - Assess and monitor skin integrity  - Assess and monitor nutrition and hydration status  - Monitor labs (i e  albumin)  - Assess for incontinence   - Turn and reposition patient  - Assist with mobility/ambulation  - Relieve pressure over bony prominences  - Avoid friction and shearing  - Provide appropriate hygiene as needed including keeping skin clean and dry  - Evaluate need for skin moisturizer/barrier cream  - Collaborate with interdisciplinary team (i e  Nutrition, Rehabilitation, etc )   - Patient/family teaching   Outcome: Progressing

## 2018-11-05 NOTE — PROGRESS NOTES
Progress Note - Fidelina Leavitt 1950, 77 y o  male MRN: [id number]    Unit/Bed#: [facility] 269-02 Encounter: [id number]    Primary Care Provider: No primary care provider on file  Date and time admitted to hospital: 10/30/2018  4:37 PM        Dyspnea on exertion   Assessment & Plan    He had an episode of shortness of breath during physical therapy November 1st  He reported increased effort of breathing but denied chest pain or shortness of breath or nausea or diaphoresis  During this time, the pulse oximetry was generally around 90%  The heart rate remained between 60 in 80  The blood pressure was in the acceptable ranges well  His symptoms gradually resolved with change in posture  He noted that he felt more short of breath when he was sitting upright and felt that his chest was being compressed by his abdominal bulk  He also felt that he was more short of breath lying flat when his abdominal bulk was compressing his chest   He felt comfortable at 45° elevation  I reviewed this issue with Dr Latasha Rojas from Internal Medicine  We agreed that in the absence of any significant pathology noted on EKG and lab work, we  believe that the symptoms were related to physical deconditioning and a close monitoring at this point will be appropriate  Chronic systolic CHF (congestive heart failure) ([facility] [address] )   Assessment & Plan    We will follow daily weights as well as symptoms and continue the current regimen  He had an episode of shortness of breath during activity and physical therapy on November 1st   During this time, his pulse oximetry remained over 95% and his heart rate remained generally in the normal range as did his blood pressure  He  returns to comfortable breathing after changes in posture  He continues on Lasix  Troponin today was negative  ProBNP was in the normal range as well       Hypertrophic cardiomyopathy ([facility] [address] )   Assessment & Plan    As above, cardiology management is ongoing  We will continue the current medication regimen as he is hemodynamically stable  Atrial fibrillation, chronic ([address] )   Assessment & Plan    I reviewed the progress note from [facility] indicating that the patient should continue amiodarone and metoprolol as well as anticoagulation  He continues on these medications  EKG  indicates sinus rhythm with bundle branch block  He states that he has 2 cardiologists   I did discuss this with the primary care physician Dr Britney Sanchez  The patient has the local cardiologist Dr Kelsey Gonsalves who is directing his care  His cardiologist at [facility] was involved primarily for cardiac dysrhythmia and ablation procedure  I contacted both Cardiology offices and asked him to contact me with any further input  Diabetes mellitus type 2 with neurological manifestations [facility])   Assessment & Plan    Lab Results   Component Value Date    HGBA1C 6 3 04/22/2017     The Accu-Chek blood glucose values are variable  The patient was on a stable dose of 70/ 30 insulin, 2 times per day  He is currently on Lantus with coverage  I will review with Internal Medicine and plan to reestablish the patient's regimen of  70 /30 insulin  I discussed this with the patient's primary care physician, Dr Britney Sanchez, and he is in agreement  I am starting the morning dose of 70 30 insulin on November 6, 18 units  Recent Labs      11/04/18   2057  11/05/18   0634  11/05/18   1153  11/05/18   1703   POCGLU  135*  98  120*  103*       Blood Sugar Average: Last 72 hrs:  (P) 127 4     HTN (hypertension)   Assessment & Plan    Blood pressure is adequate on the current regimen  Will continue to monitor the blood pressure  Internal Medicine is consulted for further blood pressure management  Aspiration pneumonitis [facility])   Assessment & Plan    He has evidence of pneumonitis on CT scan and aspiration on video swallow  Speech language pathology has been consulted for swallowing  He is on the modified liquids  Vital-stim is used to improve swallow function  Speech therapy indicates that they are contacting the other hospital to obtain records to optimize care  He denies any difficulties with chewing and swallowing now  He hopes to advance past the thickened liquids soon  Abnormal gait   Assessment & Plan    As above, the patient's abnormal gait is likely multifactorial in nature and will likely benefit from rehabilitation therapies  Currently, he is most safe with the walker  He is not in favor of using this device  We will continue to work towards using the cane  Fall (on) (from) unspecified stairs and steps, sequela   Assessment & Plan    The patient has the abnormal gait, likely multifactorial with contributions from parkinsonism and diabetic peripheral polyneuropathy and physical deconditioning and cerebral microangiopathy, among others  He is an appropriate candidate for rehabilitation therapies to improve gait quality and safety  He needs to be independent with light homemaking ambulatory activities at home  He currently needs supervision and cues     We will proceed with a comprehensive rehabilitation program for gait assessment and improvement  * Parkinsonism [facility])   Assessment & Plan    He has the diagnosis of parkinsonism but is on no medications  I will spoke with the primary care physician who prefers to avoid starting Parkinson's medications now but rather utilizing therapy interventions to reduce his Parkinson's symptoms of abnormal gait and falls  I did contact the patient's neurologist and their office indicates that he is not in town but he will contact me in the near future when he returns  We will not institute any new medications at the present time  Dr Imtiaz Darden of Neurology saw the patient and identifies the Parkinson's symptoms  He suggests that medications may be helpful but also notes the potential adverse impact on cardiac rhythm    The patient prefers no medicines now  Subjective/Objective     Subjective: The patient reports feeling well today  He states that he slept adequately  He reports adequate bowel and bladder function  Appetite is good  He denies any other new chest pain or shortness of breath or other EENT, ophthalmological, cardiac, pulmonary, GI, , hematologic, dermatologic, psychiatric other neurologic of or musculoskeletal difficulties other than those consistent with the past medical history and history of present illness  Objective:  Vitals: Blood pressure 145/73, pulse 69, temperature (!) 96 3 °F (35 7 °C), temperature source Temporal, resp  rate 20, height 6' 5" (1 956 m), weight (!) 146 kg (321 lb 4 8 oz), SpO2 98 %  ,Body mass index is 38 1 kg/m²  No intake or output data in the 24 hours ending 11/05/18 1722    Invasive Devices          No matching active lines, drains, or airways          Physical Exam:   Vital signs are noted  Head is normocephalic and atraumatic  Neck has diminished range of motion  Mucous membranes are moist   There is no significant erythema or exudate  Cardiac rhythm is regular without ectopy  Lungs are clear without crackles  Abdomen is soft and obese and nontender  Limb examination reveals that both calves are nontender and there is no edema  Range of motion is preserved  Neurologic examination is remarkable for parkinsonian features including diminished facial expression and diminished blink and mild cogwheel  He walks with contact guard and supervision with a single-point cane with the therapist     Lab, Imaging and other studies: I have personally reviewed pertinent reports      VTE Pharmacologic Prophylaxis: Reason for no pharmacologic prophylaxis Xarelto  VTE Mechanical Prophylaxis: reason for no mechanical VTE prophylaxis Xarelto

## 2018-11-05 NOTE — ASSESSMENT & PLAN NOTE
The patient has the abnormal gait, likely multifactorial with contributions from parkinsonism and diabetic peripheral polyneuropathy and physical deconditioning and cerebral microangiopathy, among others  He is an appropriate candidate for rehabilitation therapies to improve gait quality and safety  He needs to be independent with light homemaking ambulatory activities at home  He currently needs supervision and cues     We will proceed with a comprehensive rehabilitation program for gait assessment and improvement

## 2018-11-06 LAB
GLUCOSE SERPL-MCNC: 105 MG/DL (ref 70–99)
GLUCOSE SERPL-MCNC: 135 MG/DL (ref 70–99)
GLUCOSE SERPL-MCNC: 97 MG/DL (ref 70–99)
GLUCOSE SERPL-MCNC: 99 MG/DL (ref 70–99)

## 2018-11-06 PROCEDURE — 82948 REAGENT STRIP/BLOOD GLUCOSE: CPT

## 2018-11-06 RX ADMIN — METFORMIN HYDROCHLORIDE 1000 MG: 500 TABLET ORAL at 17:55

## 2018-11-06 RX ADMIN — FOLIC ACID 1 MG: 1 TABLET ORAL at 08:33

## 2018-11-06 RX ADMIN — POLYETHYLENE GLYCOL 3350 17 G: 17 POWDER, FOR SOLUTION ORAL at 08:33

## 2018-11-06 RX ADMIN — SENNOSIDES AND DOCUSATE SODIUM 1 TABLET: 8.6; 5 TABLET ORAL at 21:54

## 2018-11-06 RX ADMIN — VITAMIN D, TAB 1000IU (100/BT) 1000 UNITS: 25 TAB at 08:33

## 2018-11-06 RX ADMIN — ACETAMINOPHEN 650 MG: 325 TABLET ORAL at 23:59

## 2018-11-06 RX ADMIN — ATORVASTATIN CALCIUM 40 MG: 40 TABLET, FILM COATED ORAL at 17:56

## 2018-11-06 RX ADMIN — METFORMIN HYDROCHLORIDE 1000 MG: 500 TABLET ORAL at 08:32

## 2018-11-06 RX ADMIN — POTASSIUM CHLORIDE 20 MEQ: 750 TABLET, EXTENDED RELEASE ORAL at 08:32

## 2018-11-06 RX ADMIN — ALLOPURINOL 300 MG: 300 TABLET ORAL at 08:33

## 2018-11-06 RX ADMIN — FUROSEMIDE 40 MG: 40 TABLET ORAL at 08:33

## 2018-11-06 RX ADMIN — LISINOPRIL 10 MG: 10 TABLET ORAL at 08:32

## 2018-11-06 RX ADMIN — ASPIRIN 81 MG: 81 TABLET, COATED ORAL at 08:33

## 2018-11-06 RX ADMIN — AMIODARONE HYDROCHLORIDE 200 MG: 200 TABLET ORAL at 08:32

## 2018-11-06 RX ADMIN — BRIMONIDINE TARTRATE 1 DROP: 1.5 SOLUTION OPHTHALMIC at 21:55

## 2018-11-06 RX ADMIN — RIVAROXABAN 20 MG: 20 TABLET, FILM COATED ORAL at 17:55

## 2018-11-06 RX ADMIN — BRIMONIDINE TARTRATE 1 DROP: 1.5 SOLUTION OPHTHALMIC at 16:58

## 2018-11-06 RX ADMIN — BRIMONIDINE TARTRATE 1 DROP: 1.5 SOLUTION OPHTHALMIC at 08:40

## 2018-11-06 RX ADMIN — INSULIN ASPART 18 UNITS: 100 INJECTION, SUSPENSION SUBCUTANEOUS at 08:34

## 2018-11-06 RX ADMIN — METOPROLOL SUCCINATE 100 MG: 50 TABLET, EXTENDED RELEASE ORAL at 08:36

## 2018-11-06 RX ADMIN — CYANOCOBALAMIN TAB 1000 MCG 1000 MCG: 1000 TAB at 08:33

## 2018-11-06 RX ADMIN — THIAMINE HCL TAB 100 MG 100 MG: 100 TAB at 08:32

## 2018-11-06 RX ADMIN — LATANOPROST 1 DROP: 50 SOLUTION OPHTHALMIC at 21:56

## 2018-11-06 RX ADMIN — MAGNESIUM OXIDE TAB 400 MG (241.3 MG ELEMENTAL MG) 400 MG: 400 (241.3 MG) TAB at 08:32

## 2018-11-06 RX ADMIN — Medication 1 TABLET: at 08:33

## 2018-11-06 RX ADMIN — TAMSULOSIN HYDROCHLORIDE 0.4 MG: 0.4 CAPSULE ORAL at 17:55

## 2018-11-06 NOTE — PLAN OF CARE
Problem: Potential for Falls  Goal: Patient will remain free of falls  INTERVENTIONS:  - Assess patient frequently for physical needs  -  Identify cognitive and physical deficits and behaviors that affect risk of falls    -  [place] fall precautions as indicated by assessment   - Educate patient/family on patient safety including physical limitations  - Instruct patient to call for assistance with activity based on assessment  - Modify environment to reduce risk of injury  - Consider OT/PT consult to assist with strengthening/mobility   Outcome: Progressing      Problem: MUSCULOSKELETAL - ADULT  Goal: Maintain or return mobility to safest level of function  INTERVENTIONS:  - Assess patient's ability to carry out ADLs; assess patient's baseline for ADL function and identify physical deficits which impact ability to perform ADLs (bathing, care of mouth/teeth, toileting, grooming, dressing, etc )  - Assess/evaluate cause of self-care deficits   - Assess range of motion  - Assess patient's mobility; develop plan if impaired  - Assess patient's need for assistive devices and provide as appropriate  - Encourage maximum independence but intervene and supervise when necessary  - Involve family in performance of ADLs  - Assess for home care needs following discharge   - Request OT consult to assist with ADL evaluation and planning for discharge  - Provide patient education as appropriate   Outcome: Progressing    Goal: Maintain proper alignment of affected body part  INTERVENTIONS:  - Support, maintain and protect limb and body alignment  - Provide pt/fam with appropriate education   Outcome: Progressing      Problem: PAIN - ADULT  Goal: Verbalizes/displays adequate comfort level or baseline comfort level  Interventions:  - Encourage patient to monitor pain and request assistance  - Assess pain using appropriate pain scale  - Administer analgesics based on type and severity of pain and evaluate response  - Implement non-pharmacological measures as appropriate and evaluate response  - Consider cultural and social influences on pain and pain management  - Notify physician/advanced practitioner if interventions unsuccessful or patient reports new pain   Outcome: Progressing      Problem: SAFETY ADULT  Goal: Maintain or return to baseline ADL function  INTERVENTIONS:  -  Assess patient's ability to carry out ADLs; assess patient's baseline for ADL function and identify physical deficits which impact ability to perform ADLs (bathing, care of mouth/teeth, toileting, grooming, dressing, etc )  - Assess/evaluate cause of self-care deficits   - Assess range of motion  - Assess patient's mobility; develop plan if impaired  - Assess patient's need for assistive devices and provide as appropriate  - Encourage maximum independence but intervene and supervise when necessary  ¯ Involve family in performance of ADLs  ¯ Assess for home care needs following discharge   ¯ Request OT consult to assist with ADL evaluation and planning for discharge  ¯ Provide patient education as appropriate   Outcome: Progressing    Goal: Maintain or return mobility status to optimal level  INTERVENTIONS:  - Assess patient's baseline mobility status (ambulation, transfers, stairs, etc )    - Identify cognitive and physical deficits and behaviors that affect mobility  - Identify mobility aids required to assist with transfers and/or ambulation (gait belt, sit-to-stand, lift, walker, cane, etc )  - [place] fall precautions as indicated by assessment  - Record patient progress and toleration of activity level on Mobility SBAR; progress patient to next Phase/Stage  - Instruct patient to call for assistance with activity based on assessment  - Request Rehabilitation consult to assist with strengthening/weightbearing, etc    Outcome: Progressing      Problem: DISCHARGE PLANNING  Goal: Discharge to home or other facility with appropriate resources  INTERVENTIONS:  - Identify barriers to discharge w/patient and caregiver  - Arrange for needed discharge resources and transportation as appropriate  - Identify discharge learning needs (meds, wound care, etc )  - Arrange for interpretive services to assist at discharge as needed  - Refer to Case Management Department for coordinating discharge planning if the patient needs post-hospital services based on physician/advanced practitioner order or complex needs related to functional status, cognitive ability, or social support system   Outcome: Progressing      Problem: Prexisting or High Potential for Compromised Skin Integrity  Goal: Skin integrity is maintained or improved  INTERVENTIONS:  - Identify patients at risk for skin breakdown  - Assess and monitor skin integrity  - Assess and monitor nutrition and hydration status  - Monitor labs (i e  albumin)  - Assess for incontinence   - Turn and reposition patient  - Assist with mobility/ambulation  - Relieve pressure over bony prominences  - Avoid friction and shearing  - Provide appropriate hygiene as needed including keeping skin clean and dry  - Evaluate need for skin moisturizer/barrier cream  - Collaborate with interdisciplinary team (i e  Nutrition, Rehabilitation, etc )   - Patient/family teaching   Outcome: Progressing

## 2018-11-06 NOTE — ASSESSMENT & PLAN NOTE
He has the diagnosis of parkinsonism but is on no medications  I will spoke with the primary care physician who prefers to avoid starting Parkinson's medications now but rather utilizing therapy interventions to reduce his Parkinson's symptoms of abnormal gait and falls  I did contact the patient's neurologist and their office indicates that he is not in town but he will contact me in the near future when he returns  We will not institute any new medications at the present time  Dr Wendy Gonzales of Neurology saw the patient and identifies the Parkinson's symptoms  He suggests that medications may be helpful but also notes the potential adverse impact on cardiac rhythm  The patient prefers no medicines now

## 2018-11-06 NOTE — ASSESSMENT & PLAN NOTE
Lab Results   Component Value Date    HGBA1C 6 3 04/22/2017     The Accu-Chek blood glucose values are variable  The patient was on a stable dose of 70/ 30 insulin, 2 times per day  He is now on a smaller  morning dose of 70 30 insulin starting November 6, (18 units)    Recent Labs      11/05/18   1703  11/05/18   2100  11/06/18   0627  11/06/18   1209   POCGLU  103*  104*  99  105*       Blood Sugar Average: Last 72 hrs:  (P) 561 [id number]

## 2018-11-06 NOTE — ASSESSMENT & PLAN NOTE
The patient has the abnormal gait, likely multifactorial with contributions from parkinsonism and diabetic peripheral polyneuropathy and physical deconditioning and cerebral microangiopathy, among others  He is an appropriate candidate for rehabilitation therapies to improve gait quality and safety  He needs to be independent with bathroom transfers at home  He currently needs supervision and cues     We will proceed with a comprehensive rehabilitation program for gait assessment and improvement

## 2018-11-06 NOTE — ASSESSMENT & PLAN NOTE
He has evidence of pneumonitis on CT scan and aspiration on video swallow  Speech language pathology has been consulted for swallowing  He is on the modified liquids  Vital-stim is used to improve swallow function  Speech therapy indicates that they are contacting the other hospital to obtain records to optimize care  He denies any difficulties with chewing and swallowing now  He hopes to advance past the thickened liquids soon  Video swallow is planned for later this week

## 2018-11-06 NOTE — ASSESSMENT & PLAN NOTE
I reviewed the progress note from [facility] indicating that the patient should continue amiodarone and metoprolol as well as anticoagulation  He continues on these medications  EKG  indicates sinus rhythm with bundle branch block  He states that he has 2 cardiologists   I did discuss this with the primary care physician Dr Julio César Nogueira  The patient has the local cardiologist Dr Clara Anand who is directing his care  His cardiologist at [facility] was involved primarily for cardiac dysrhythmia and ablation procedure  I contacted both Cardiology offices and asked them  to contact me with any further input

## 2018-11-06 NOTE — PROGRESS NOTES
Progress Note - Madhavi Sanchez 1950, 77 y o  male MRN: [id number]    Unit/Bed#: [facility] 269-02 Encounter: [id number]    Primary Care Provider: No primary care provider on file  Date and time admitted to hospital: 10/30/2018  4:37 PM        Dyspnea on exertion   Assessment & Plan    He had an episode of shortness of breath during physical therapy November 1st  He reported increased effort of breathing but denied chest pain or shortness of breath or nausea or diaphoresis  During this time, the pulse oximetry was generally around 90%  The heart rate remained between 60 in 80  The blood pressure was in the acceptable ranges well  His symptoms gradually resolved with change in posture  He noted that he felt more short of breath when he was sitting upright and felt that his chest was being compressed by his abdominal bulk  He also felt that he was more short of breath lying flat when his abdominal bulk was compressing his chest   He felt comfortable at 45° elevation  I reviewed this issue with Dr Adeline Johns from Internal Medicine  We agreed that in the absence of any significant pathology noted on EKG and lab work, we  believe that the symptoms were related to physical deconditioning and a close monitoring at this point will be appropriate  Chronic systolic CHF (congestive heart failure) ([facility] [address] )   Assessment & Plan    We will follow daily weights as well as symptoms and continue the current regimen  He had an episode of shortness of breath during activity and physical therapy on November 1st   During this time, his pulse oximetry remained over 95% and his heart rate remained generally in the normal range as did his blood pressure  He  returns to comfortable breathing after changes in posture  He continues on Lasix  Troponin today was negative  ProBNP was in the normal range as well       Hypertrophic cardiomyopathy ([facility] [address] )   Assessment & Plan    As above, cardiology management is ongoing  We will continue the current medication regimen as he is hemodynamically stable  Atrial fibrillation, chronic ([address] )   Assessment & Plan    I reviewed the progress note from [facility] indicating that the patient should continue amiodarone and metoprolol as well as anticoagulation  He continues on these medications  EKG  indicates sinus rhythm with bundle branch block  He states that he has 2 cardiologists   I did discuss this with the primary care physician Dr Radha Matos  The patient has the local cardiologist Dr Sophie Ambrocio who is directing his care  His cardiologist at [facility] was involved primarily for cardiac dysrhythmia and ablation procedure  I contacted both Cardiology offices and asked them  to contact me with any further input  Diabetes mellitus type 2 with neurological manifestations [facility])   Assessment & Plan    Lab Results   Component Value Date    HGBA1C 6 3 04/22/2017     The Accu-Chek blood glucose values are variable  The patient was on a stable dose of 70/ 30 insulin, 2 times per day  He is now on a smaller  morning dose of 70 30 insulin starting November 6, (18 units)  Recent Labs      11/05/18   1703  11/05/18   2100  11/06/18   0627  11/06/18   1209   POCGLU  103*  104*  99  105*       Blood Sugar Average: Last 72 hrs:  (P) 442 [id number]     HTN (hypertension)   Assessment & Plan    Blood pressure is adequate on the current regimen  Will continue to monitor the blood pressure  Internal Medicine is consulted for further blood pressure management  Aspiration pneumonitis [facility])   Assessment & Plan    He has evidence of pneumonitis on CT scan and aspiration on video swallow  Speech language pathology has been consulted for swallowing  He is on the modified liquids  Vital-stim is used to improve swallow function  Speech therapy indicates that they are contacting the other hospital to obtain records to optimize care   He denies any difficulties with chewing and swallowing now  He hopes to advance past the thickened liquids soon  Video swallow is planned for later this week  Abnormal gait   Assessment & Plan    As above, the patient's abnormal gait is likely multifactorial in nature and will likely benefit from rehabilitation therapies  Currently, he is most safe with the walker  He is not in favor of using this device  We will continue to work towards using the cane  He ultimately intends to walk with no upper limb assistive device  He has been informed that he has increased risk of fall and injury if he walks with no device as compared to if he uses the cane or the walker  Fall (on) (from) unspecified stairs and steps, sequela   Assessment & Plan    The patient has the abnormal gait, likely multifactorial with contributions from parkinsonism and diabetic peripheral polyneuropathy and physical deconditioning and cerebral microangiopathy, among others  He is an appropriate candidate for rehabilitation therapies to improve gait quality and safety  He needs to be independent with bathroom transfers at home  He currently needs supervision and cues     We will proceed with a comprehensive rehabilitation program for gait assessment and improvement  * Parkinsonism [facility])   Assessment & Plan    He has the diagnosis of parkinsonism but is on no medications  I will spoke with the primary care physician who prefers to avoid starting Parkinson's medications now but rather utilizing therapy interventions to reduce his Parkinson's symptoms of abnormal gait and falls  I did contact the patient's neurologist and their office indicates that he is not in town but he will contact me in the near future when he returns  We will not institute any new medications at the present time  Dr Rudy Sin of Neurology saw the patient and identifies the Parkinson's symptoms    He suggests that medications may be helpful but also notes the potential adverse impact on cardiac rhythm  The patient prefers no medicines now  Subjective/Objective     Subjective: The patient reports feeling well without any new medical problems  He notes a visual disturbance when he is walking  He feels that there is unusual movement as though he is going faster than he actually is  He denies any similar problem when he is seated  While seated, he indicates that he can read small print with each eye  He does note that he sees his eye doctor 2 times per year and probably needs new glasses  He reports adequate bowel and bladder function  He reports adequate sleep and appetite  He denies any other significant ENT, ophthalmological, cardiac, pulmonary, GI, , hematologic, dermatologic, psychiatric, neurologic, and musculoskeletal difficulties other than those consistent with the past medical history and history of present illness  Objective:  Vitals: Blood pressure 119/61, pulse 64, temperature 97 5 °F (36 4 °C), temperature source Temporal, resp  rate 18, height 6' 5" (1 956 m), weight (!) 145 kg (320 lb 7 oz), SpO2 95 %  ,Body mass index is 38 kg/m²  Intake/Output Summary (Last 24 hours) at 11/06/18 1243  Last data filed at 11/06/18 0903   Gross per 24 hour   Intake              340 ml   Output                0 ml   Net              340 ml       Invasive Devices          No matching active lines, drains, or airways          Physical Exam: Head is normocephalic and atraumatic  The neck has appropriate range of motion  Mucous membranes are moist   There is no significant exudate or erythema  Cardiac rhythm is regular without ectopy  Lungs are clear without wheeze  Abdomen is soft and obese and nontender  Limb examination reveals appropriate range of motion except for limitation at the left foot and ankle consistent with that Charcot foot surgery  Both calves are nontender  There is no significant edema    Neurologic examination reveals cognitive function relatively preserved without deficits in language  Cranial nerve examination reveals extraocular movements are intact and visual acuity is adequate for counting fingers in all fields with each eye  There is no significant facial weakness  Motor strength is generally 4+ over 5 without focal deficit  Movements are mildly bradykinetic  Sensation is present but diminished in the feet for light touch and joint position sense  Muscle stretch reflexes are inconsistent  Coordination is good for finger to chin testing bilaterally  Lab, Imaging and other studies: I have personally reviewed pertinent reports      VTE Pharmacologic Prophylaxis: Reason for no pharmacologic prophylaxis Xarelto  VTE Mechanical Prophylaxis: reason for no mechanical VTE prophylaxis He is on Xarelto

## 2018-11-06 NOTE — ASSESSMENT & PLAN NOTE
As above, the patient's abnormal gait is likely multifactorial in nature and will likely benefit from rehabilitation therapies  Currently, he is most safe with the walker  He is not in favor of using this device  We will continue to work towards using the cane  He ultimately intends to walk with no upper limb assistive device  He has been informed that he has increased risk of fall and injury if he walks with no device as compared to if he uses the cane or the walker

## 2018-11-06 NOTE — ASSESSMENT & PLAN NOTE
He had an episode of shortness of breath during physical therapy November 1st  He reported increased effort of breathing but denied chest pain or shortness of breath or nausea or diaphoresis  During this time, the pulse oximetry was generally around 90%  The heart rate remained between 60 in 80  The blood pressure was in the acceptable ranges well  His symptoms gradually resolved with change in posture  He noted that he felt more short of breath when he was sitting upright and felt that his chest was being compressed by his abdominal bulk  He also felt that he was more short of breath lying flat when his abdominal bulk was compressing his chest   He felt comfortable at 45° elevation  I reviewed this issue with Dr Sulema Matta from Internal Medicine  We agreed that in the absence of any significant pathology noted on EKG and lab work, we  believe that the symptoms were related to physical deconditioning and a close monitoring at this point will be appropriate

## 2018-11-07 LAB
GLUCOSE SERPL-MCNC: 114 MG/DL (ref 70–99)
GLUCOSE SERPL-MCNC: 116 MG/DL (ref 70–99)
GLUCOSE SERPL-MCNC: 119 MG/DL (ref 70–99)
GLUCOSE SERPL-MCNC: 140 MG/DL (ref 70–99)

## 2018-11-07 PROCEDURE — 82948 REAGENT STRIP/BLOOD GLUCOSE: CPT

## 2018-11-07 RX ADMIN — INSULIN ASPART 18 UNITS: 100 INJECTION, SUSPENSION SUBCUTANEOUS at 08:05

## 2018-11-07 RX ADMIN — VITAMIN D, TAB 1000IU (100/BT) 1000 UNITS: 25 TAB at 08:04

## 2018-11-07 RX ADMIN — BRIMONIDINE TARTRATE 1 DROP: 1.5 SOLUTION OPHTHALMIC at 08:07

## 2018-11-07 RX ADMIN — BRIMONIDINE TARTRATE 1 DROP: 1.5 SOLUTION OPHTHALMIC at 21:51

## 2018-11-07 RX ADMIN — LISINOPRIL 10 MG: 10 TABLET ORAL at 08:04

## 2018-11-07 RX ADMIN — POTASSIUM CHLORIDE 20 MEQ: 750 TABLET, EXTENDED RELEASE ORAL at 08:04

## 2018-11-07 RX ADMIN — FUROSEMIDE 40 MG: 40 TABLET ORAL at 08:05

## 2018-11-07 RX ADMIN — ASPIRIN 81 MG: 81 TABLET, COATED ORAL at 08:05

## 2018-11-07 RX ADMIN — METFORMIN HYDROCHLORIDE 1000 MG: 500 TABLET ORAL at 17:04

## 2018-11-07 RX ADMIN — METOPROLOL SUCCINATE 100 MG: 50 TABLET, EXTENDED RELEASE ORAL at 08:04

## 2018-11-07 RX ADMIN — FOLIC ACID 1 MG: 1 TABLET ORAL at 08:05

## 2018-11-07 RX ADMIN — ALLOPURINOL 300 MG: 300 TABLET ORAL at 08:04

## 2018-11-07 RX ADMIN — BRIMONIDINE TARTRATE 1 DROP: 1.5 SOLUTION OPHTHALMIC at 17:04

## 2018-11-07 RX ADMIN — THIAMINE HCL TAB 100 MG 100 MG: 100 TAB at 08:07

## 2018-11-07 RX ADMIN — MAGNESIUM OXIDE TAB 400 MG (241.3 MG ELEMENTAL MG) 400 MG: 400 (241.3 MG) TAB at 08:05

## 2018-11-07 RX ADMIN — RIVAROXABAN 20 MG: 20 TABLET, FILM COATED ORAL at 17:04

## 2018-11-07 RX ADMIN — METFORMIN HYDROCHLORIDE 1000 MG: 500 TABLET ORAL at 08:04

## 2018-11-07 RX ADMIN — TAMSULOSIN HYDROCHLORIDE 0.4 MG: 0.4 CAPSULE ORAL at 17:04

## 2018-11-07 RX ADMIN — POLYETHYLENE GLYCOL 3350 17 G: 17 POWDER, FOR SOLUTION ORAL at 08:07

## 2018-11-07 RX ADMIN — CYANOCOBALAMIN TAB 1000 MCG 1000 MCG: 1000 TAB at 08:04

## 2018-11-07 RX ADMIN — Medication 1 TABLET: at 08:05

## 2018-11-07 RX ADMIN — ACETAMINOPHEN 650 MG: 325 TABLET ORAL at 21:50

## 2018-11-07 RX ADMIN — ATORVASTATIN CALCIUM 40 MG: 40 TABLET, FILM COATED ORAL at 17:04

## 2018-11-07 RX ADMIN — LATANOPROST 1 DROP: 50 SOLUTION OPHTHALMIC at 21:51

## 2018-11-07 RX ADMIN — SENNOSIDES AND DOCUSATE SODIUM 1 TABLET: 8.6; 5 TABLET ORAL at 21:50

## 2018-11-07 RX ADMIN — AMIODARONE HYDROCHLORIDE 200 MG: 200 TABLET ORAL at 08:05

## 2018-11-07 NOTE — PROGRESS NOTES
Progress Note - Maida Walt 1950, 77 y o  male MRN: [id number]    Unit/Bed#: Shaina Hathaway 269-02 Encounter: [id number]    Primary Care Provider: No primary care provider on file  Date and time admitted to hospital: 10/30/2018  4:37 PM        Dyspnea on exertion   Assessment & Plan    He had an episode of shortness of breath during physical therapy November 1st  He reported increased effort of breathing but denied chest pain or shortness of breath or nausea or diaphoresis  During this time, the pulse oximetry was generally around 90%  The heart rate remained between 60 in 80  The blood pressure was in the acceptable ranges well  His symptoms gradually resolved with change in posture  He noted that he felt more short of breath when he was sitting upright and felt that his chest was being compressed by his abdominal bulk  He also felt that he was more short of breath lying flat when his abdominal bulk was compressing his chest   He felt comfortable at 45° elevation  I reviewed this issue with Dr Lyndsey Bustamante from Internal Medicine  We agreed that in the absence of any significant pathology noted on EKG and lab work, we  believe that the symptoms were related to physical deconditioning and a close monitoring at this point will be appropriate  Chronic systolic CHF (congestive heart failure) ([facility] [address] )   Assessment & Plan    We will follow daily weights as well as symptoms and continue the current regimen  He had an episode of shortness of breath during activity and physical therapy on November 1st   During this time, his pulse oximetry remained over 95% and his heart rate remained generally in the normal range as did his blood pressure  He  returns to comfortable breathing after changes in posture  He continues on Lasix  Troponin today was negative  ProBNP was in the normal range as well       Hypertrophic cardiomyopathy ([address] )   Assessment & Plan    As above, cardiology management is ongoing  We will continue the current medication regimen as he is hemodynamically stable  Atrial fibrillation, chronic ([address] )   Assessment & Plan    I reviewed the progress note from [facility] indicating that the patient should continue amiodarone and metoprolol as well as anticoagulation  He continues on these medications  EKG  indicates sinus rhythm with bundle branch block  He states that he has 2 cardiologists   I did discuss this with the primary care physician Dr Kayla Wagner  The patient has the local cardiologist Dr Shaneka Silva who is directing his care  His cardiologist at [facility] was involved primarily for cardiac dysrhythmia and ablation procedure  I contacted both Cardiology offices and asked them  to contact me with any further input  No new changes have been recommended  Diabetes mellitus type 2 with neurological manifestations [facility])   Assessment & Plan    Lab Results   Component Value Date    HGBA1C 6 3 04/22/2017     The Accu-Chek blood glucose values are variable  The patient was on a stable dose of 70/ 30 insulin, 2 times per day  He is now on a smaller  morning dose of 70 30 insulin starting November 6, (18 units)  Accu-Chek blood glucoses are satisfactory on the current regimen of the 70 30 insulin and the 2 times per day dose of metformin 1000 mg  We will tentatively planned discharge on these medications and follow up with the primary care physician  Recent Labs      11/06/18   0627  11/06/18   1209  11/06/18   1629  11/06/18   2046   POCGLU  99  105*  97  135*       Blood Sugar Average: Last 72 hrs:  (P) 486 [id number]     HTN (hypertension)   Assessment & Plan    Blood pressure is adequate on the current regimen  Will continue to monitor the blood pressure  Internal Medicine is consulted for further blood pressure management       Aspiration pneumonitis [facility])   Assessment & Plan    He has evidence of pneumonitis on CT scan and aspiration on video swallow  Speech language pathology has been consulted for swallowing  He is on the modified liquids  Vital-stim is used to improve swallow function  The patient is also performing exercises as directed by speech language pathology  He denies any difficulties with chewing and swallowing now  He hopes to advance past the thickened liquids soon  Video swallow is planned for later this week  Abnormal gait   Assessment & Plan    As above, the patient's abnormal gait is likely multifactorial in nature and will likely benefit from rehabilitation therapies  Currently, he is most safe with the walker  He is not in favor of using this device  We will continue to work towards using the cane  He ultimately intends to walk with no upper limb assistive device  He has been informed that he has increased risk of fall and injury if he walks with no device as compared to if he uses the cane or the walker  We proceed with rehabilitation therapies to optimize gait quality and safety  Fall (on) (from) unspecified stairs and steps, sequela   Assessment & Plan    The patient has the abnormal gait, likely multifactorial with contributions from parkinsonism and diabetic peripheral polyneuropathy and physical deconditioning and cerebral microangiopathy, among others  He is an appropriate candidate for rehabilitation therapies to improve gait quality and safety  He needs to be independent with mobility including elevations at home  Efren Reyes He currently needs supervision and cues     We will proceed with a comprehensive rehabilitation program for gait assessment and improvement  * Parkinsonism [facility])   Assessment & Plan    He has the diagnosis of parkinsonism but is on no medications  I will spoke with the primary care physician who prefers to avoid starting Parkinson's medications now but rather utilizing therapy interventions to reduce his Parkinson's symptoms of abnormal gait and falls    I did contact the patient's neurologist and their office indicates that he is not in town but he will contact me in the near future when he returns  We will not institute any new medications at the present time  Dr Bird Garcia of Neurology saw the patient and identifies the Parkinson's symptoms  He suggests that medications may be helpful but also notes the potential adverse impact on cardiac rhythm  The patient prefers no medicines now  Subjective/Objective     Subjective:   He reports feeling well this morning  He denies any new problems with chest pain or shortness of breath or visual changes or other EENT, ophthalmological, cardiac, pulmonary, GI, , hematologic, dermatologic, psychiatric, neurologic, or musculoskeletal changes other than those consistent with the past medical history and history of present illness  He reports adequate appetite  He reports adequate bowel and bladder function  He reports that sleep is still inconsistent and that he prefers his bed at home  He notes that he was diagnosed with sleep apnea in the past but this has improved since weight loss  He does admit to some daytime drowsiness  Objective:  Vitals: Blood pressure 127/60, pulse 65, temperature 97 9 °F (36 6 °C), temperature source Temporal, resp  rate 18, height 6' 5" (1 956 m), weight (!) 146 kg (321 lb 6 9 oz), SpO2 95 %  ,Body mass index is 38 12 kg/m²  The head is normocephalic and atrial the without masses  The neck has functional but diminished range of motion  Mucous membranes are moist without erythema  Cardiac rhythm is regular without ectopy  Lungs are clear without wheeze or rhonchi  Abdomen is soft and obese and nontender  Limb examination reveals no edema or calf tenderness  Skin has healing abrasions on the toes but is otherwise intact  Neurologic examination is stable with Parkinson's features and peripheral polyneuropathy    No intake or output data in the 24 hours ending 11/07/18 1049    Invasive Devices No matching active lines, drains, or airways          Physical Exam: The vital signs are reviewed  The head is normocephalic and atraumatic without masses  Neck has functional range  Mucous membranes are moist without erythema  Cardiac rhythm continues regular without ectopy  Lungs are clear without wheeze or rhonchi  Abdomen is obese and soft and nontender  Limbs have no significant edema and the calves are nontender  Range of motion is functional    Lab, Imaging and other studies: I have personally reviewed pertinent reports      VTE Pharmacologic Prophylaxis: Reason for no pharmacologic prophylaxis He is on Xarelto  VTE Mechanical Prophylaxis: reason for no mechanical VTE prophylaxis He is on Xarelto

## 2018-11-07 NOTE — ASSESSMENT & PLAN NOTE
I reviewed the progress note from [facility] indicating that the patient should continue amiodarone and metoprolol as well as anticoagulation  He continues on these medications  EKG  indicates sinus rhythm with bundle branch block  He states that he has 2 cardiologists   I did discuss this with the primary care physician Dr Jac Berry  The patient has the local cardiologist Dr Byron Roman who is directing his care  His cardiologist at [facility] was involved primarily for cardiac dysrhythmia and ablation procedure  I contacted both Cardiology offices and asked them  to contact me with any further input  No new changes have been recommended

## 2018-11-07 NOTE — ASSESSMENT & PLAN NOTE
He has the diagnosis of parkinsonism but is on no medications  I will spoke with the primary care physician who prefers to avoid starting Parkinson's medications now but rather utilizing therapy interventions to reduce his Parkinson's symptoms of abnormal gait and falls  I did contact the patient's neurologist and their office indicates that he is not in town but he will contact me in the near future when he returns  We will not institute any new medications at the present time  Dr Zeenat Bejarano of Neurology saw the patient and identifies the Parkinson's symptoms  He suggests that medications may be helpful but also notes the potential adverse impact on cardiac rhythm  The patient prefers no medicines now

## 2018-11-07 NOTE — PROGRESS NOTES
11/01/18 1415   QI: Walk 50 Feet with Two Turns   Assistance Needed Incidental touching;Supervision;Verbal cues   Assistance Provided by [place] [employer] assistance   Comment Wheelchair follow of second person   Walk 50 Feet with Two Turns CARE Score 1   QI: Walking 10 Feet on Uneven Surfaces   Assistance Needed Verbal cues; Incidental touching   Assistance Provided by [place] [employer] assistance   Comment wheelchair follow of second person   Walking 10 Feet on Uneven Surfaces CARE Score 1   Ambulation   Walk Assist Level Chair Follow;Minimum Assist   Gait Pattern Decreased foot clearance; Festination; Step through   Assist Device Jaker Nan Narvaez Walked (feet) 110 ft   Walking (FIM) 1 - Patient requires assist of two people   QI: 1 Step (Curb)   Assistance Needed Incidental touching;Verbal cues   Assistance Provided by [place] [employer] assistance   Comment wheelchair follow of second person   1 Step (Curb) CARE Score 1   QI: 4 Steps   Assistance Needed Incidental touching;Verbal cues   Assistance Provided by [place] [employer] assistance   Comment second person on standby   4 Steps CARE Score 1   QI: 12 Steps   Reason if not Attempted Medical concerns   12 Steps CARE Score 88   Stairs   Type Stairs; Ramp   Assist Devices Other  (Bariatric RW)   Stairs (FIM) 1 - Patient requires assist of two people   Plan   Treatment/Interventions Functional transfer training;LE strengthening/ROM; Elevations; Therapeutic exercise; Endurance training;Equipment eval/education;Gait training   PT Therapy Minutes   PT Time In 1415   PT Time Out 1515   PT Total Time (minutes) 60   PT Mode of treatment - Individual (minutes) 60   PT Mode of treatment - Concurrent (minutes) 0   PT Mode of treatment - Group (minutes) 0   PT Mode of treatment - Co-treat (minutes) 0   PT Mode of Teatment - Total time(minutes) 60 minutes

## 2018-11-07 NOTE — ASSESSMENT & PLAN NOTE
He has evidence of pneumonitis on CT scan and aspiration on video swallow  Speech language pathology has been consulted for swallowing  He is on the modified liquids  Vital-stim is used to improve swallow function  The patient is also performing exercises as directed by speech language pathology  He denies any difficulties with chewing and swallowing now  He hopes to advance past the thickened liquids soon  Video swallow is planned for later this week

## 2018-11-07 NOTE — ASSESSMENT & PLAN NOTE
As above, the patient's abnormal gait is likely multifactorial in nature and will likely benefit from rehabilitation therapies  Currently, he is most safe with the walker  He is not in favor of using this device  We will continue to work towards using the cane  He ultimately intends to walk with no upper limb assistive device  He has been informed that he has increased risk of fall and injury if he walks with no device as compared to if he uses the cane or the walker  We proceed with rehabilitation therapies to optimize gait quality and safety

## 2018-11-07 NOTE — ASSESSMENT & PLAN NOTE
He had an episode of shortness of breath during physical therapy November 1st  He reported increased effort of breathing but denied chest pain or shortness of breath or nausea or diaphoresis  During this time, the pulse oximetry was generally around 90%  The heart rate remained between 60 in 80  The blood pressure was in the acceptable ranges well  His symptoms gradually resolved with change in posture  He noted that he felt more short of breath when he was sitting upright and felt that his chest was being compressed by his abdominal bulk  He also felt that he was more short of breath lying flat when his abdominal bulk was compressing his chest   He felt comfortable at 45° elevation  I reviewed this issue with Dr Ct Vaz from Internal Medicine  We agreed that in the absence of any significant pathology noted on EKG and lab work, we  believe that the symptoms were related to physical deconditioning and a close monitoring at this point will be appropriate

## 2018-11-07 NOTE — ASSESSMENT & PLAN NOTE
The patient has the abnormal gait, likely multifactorial with contributions from parkinsonism and diabetic peripheral polyneuropathy and physical deconditioning and cerebral microangiopathy, among others  He is an appropriate candidate for rehabilitation therapies to improve gait quality and safety  He needs to be independent with mobility including elevations at home  Gerhardt Sep He currently needs supervision and cues     We will proceed with a comprehensive rehabilitation program for gait assessment and improvement

## 2018-11-07 NOTE — PROGRESS NOTES
10/31/18 1000   QI: Roll Left and Right   Assistance Needed Supervision   Roll Left and Right CARE Score 4   QI: Sit to Lying   Assistance Needed Supervision   Sit to Lying CARE Score 4   QI: Lying to Sitting on Side of Bed   Assistance Needed Supervision   Assistance Provided by [place] No physical assistance   Lying to Sitting on Side of Bed CARE Score 4   QI: Sit to [address] Provided by [place] No physical assistance   Sit to Stand CARE Score 4   QI: Chair/Bed-to-Chair Transfer   Assistance Needed Supervision   Assistance Provided by [place] No physical assistance   Chair/Bed-to-Chair Transfer CARE Score 4   Transfer Bed/Chair/Wheelchair   Stand Pivot Minimal Assist   Sit to Stand Minimal Assist   Stand to Sit Supervision   Supine to Sit Supervision   Sit to Supine Supervision   Bed, Chair, Wheelchair Transfer (FIM) 4 - Patient requires steadying assist or light touching   QI: Car Transfer   Reason if not Attempted Medical concerns   Car Transfer CARE Score 88   QI: Walk 10 Feet   Assistance Needed Verbal cues; Supervision   Assistance Provided by [place] Total assistance   Comment wheelchair follow required of second person   Walk 10 Feet CARE Score 1   QI: Walk 150 Feet   Reason if not Attempted Medical concerns   Walk 150 Feet CARE Score 88   Ambulation   Primary Discharge Mode of Locomotion Walk   Walk Assist Level Chair Follow;Minimum Assist   Gait Pattern Slow Marina;Decreased foot clearance; Step through;Trendelenburg   Distance Walked (feet) 27 ft   Walking (FIM) 1 - Patient ambulates less than 49 feet regardless of assist/device/set up   Wheelchair mobility   QI: Does the patient use a wheelchair? 0   No   Wheelchair (FIM) 0 - Activity does not occur   QI: Picking Up Object   Reason if not Attempted Safety concerns   Picking Up Object CARE Score 88   QI: Toilet Transfer   Assistance Needed Incidental touching   Toilet Transfer CARE Score 4   Toilet Transfer Surface Assessed Raised Toilet   Adaptive Equipment Grab Bar   Toilet Transfer (FIM) 4 - Patient requires steadying assist or light touching   PT Therapy Minutes   PT Time In 1000   PT Time Out 1100   PT Total Time (minutes) 60   PT Mode of treatment - Individual (minutes) 60   PT Mode of treatment - Concurrent (minutes) 0   PT Mode of treatment - Group (minutes) 0   PT Mode of treatment - Co-treat (minutes) 0   PT Mode of Teatment - Total time(minutes) 60 minutes

## 2018-11-07 NOTE — ASSESSMENT & PLAN NOTE
Lab Results   Component Value Date    HGBA1C 6 3 04/22/2017     The Accu-Chek blood glucose values are variable  The patient was on a stable dose of 70/ 30 insulin, 2 times per day  He is now on a smaller  morning dose of 70 30 insulin starting November 6, (18 units)  Accu-Chek blood glucoses are satisfactory on the current regimen of the 70 30 insulin and the 2 times per day dose of metformin 1000 mg  We will tentatively planned discharge on these medications and follow up with the primary care physician    Recent Labs      11/06/18   0627  11/06/18   1209  11/06/18   1629  11/06/18   2046   POCGLU  99  105*  97  135*       Blood Sugar Average: Last 72 hrs:  (P) 460 [id number]

## 2018-11-08 LAB
GLUCOSE SERPL-MCNC: 114 MG/DL (ref 70–99)
GLUCOSE SERPL-MCNC: 120 MG/DL (ref 70–99)
GLUCOSE SERPL-MCNC: 139 MG/DL (ref 70–99)
GLUCOSE SERPL-MCNC: 152 MG/DL (ref 70–99)

## 2018-11-08 PROCEDURE — 82948 REAGENT STRIP/BLOOD GLUCOSE: CPT

## 2018-11-08 RX ADMIN — INSULIN ASPART 18 UNITS: 100 INJECTION, SUSPENSION SUBCUTANEOUS at 08:37

## 2018-11-08 RX ADMIN — SENNOSIDES AND DOCUSATE SODIUM 1 TABLET: 8.6; 5 TABLET ORAL at 21:31

## 2018-11-08 RX ADMIN — FOLIC ACID 1 MG: 1 TABLET ORAL at 08:39

## 2018-11-08 RX ADMIN — ALLOPURINOL 300 MG: 300 TABLET ORAL at 08:39

## 2018-11-08 RX ADMIN — THIAMINE HCL TAB 100 MG 100 MG: 100 TAB at 08:40

## 2018-11-08 RX ADMIN — METOPROLOL SUCCINATE 100 MG: 50 TABLET, EXTENDED RELEASE ORAL at 08:39

## 2018-11-08 RX ADMIN — FUROSEMIDE 40 MG: 40 TABLET ORAL at 08:40

## 2018-11-08 RX ADMIN — CYANOCOBALAMIN TAB 1000 MCG 1000 MCG: 1000 TAB at 08:40

## 2018-11-08 RX ADMIN — BRIMONIDINE TARTRATE 1 DROP: 1.5 SOLUTION OPHTHALMIC at 17:00

## 2018-11-08 RX ADMIN — LATANOPROST 1 DROP: 50 SOLUTION OPHTHALMIC at 21:32

## 2018-11-08 RX ADMIN — VITAMIN D, TAB 1000IU (100/BT) 1000 UNITS: 25 TAB at 08:39

## 2018-11-08 RX ADMIN — POLYETHYLENE GLYCOL 3350 17 G: 17 POWDER, FOR SOLUTION ORAL at 08:39

## 2018-11-08 RX ADMIN — RIVAROXABAN 20 MG: 20 TABLET, FILM COATED ORAL at 17:02

## 2018-11-08 RX ADMIN — ASPIRIN 81 MG: 81 TABLET, COATED ORAL at 08:40

## 2018-11-08 RX ADMIN — ATORVASTATIN CALCIUM 40 MG: 40 TABLET, FILM COATED ORAL at 17:02

## 2018-11-08 RX ADMIN — METFORMIN HYDROCHLORIDE 1000 MG: 500 TABLET ORAL at 08:40

## 2018-11-08 RX ADMIN — LISINOPRIL 10 MG: 10 TABLET ORAL at 08:39

## 2018-11-08 RX ADMIN — ACETAMINOPHEN 650 MG: 325 TABLET ORAL at 22:29

## 2018-11-08 RX ADMIN — METFORMIN HYDROCHLORIDE 1000 MG: 500 TABLET ORAL at 17:02

## 2018-11-08 RX ADMIN — MAGNESIUM OXIDE TAB 400 MG (241.3 MG ELEMENTAL MG) 400 MG: 400 (241.3 MG) TAB at 08:40

## 2018-11-08 RX ADMIN — TAMSULOSIN HYDROCHLORIDE 0.4 MG: 0.4 CAPSULE ORAL at 17:02

## 2018-11-08 RX ADMIN — BRIMONIDINE TARTRATE 1 DROP: 1.5 SOLUTION OPHTHALMIC at 21:32

## 2018-11-08 RX ADMIN — Medication 1 TABLET: at 08:46

## 2018-11-08 RX ADMIN — POTASSIUM CHLORIDE 20 MEQ: 750 TABLET, EXTENDED RELEASE ORAL at 08:39

## 2018-11-08 RX ADMIN — AMIODARONE HYDROCHLORIDE 200 MG: 200 TABLET ORAL at 08:39

## 2018-11-08 RX ADMIN — BRIMONIDINE TARTRATE 1 DROP: 1.5 SOLUTION OPHTHALMIC at 08:46

## 2018-11-08 NOTE — PROGRESS NOTES
Progress Note - Rachael Dejesus 1950, 77 y o  male MRN: [id number]    Unit/Bed#: [facility] 269-02 Encounter: [id number]    Primary Care Provider: No primary care provider on file  Date and time admitted to hospital: 10/30/2018  4:37 PM        Dyspnea on exertion   Assessment & Plan    He had an episode of shortness of breath during physical therapy November 1st  He reported increased effort of breathing but denied chest pain or shortness of breath or nausea or diaphoresis  During this time, the pulse oximetry was generally around 90%  The heart rate remained between 60 in 80  The blood pressure was in the acceptable ranges well  His symptoms gradually resolved with change in posture  He noted that he felt more short of breath when he was sitting upright and felt that his chest was being compressed by his abdominal bulk  He also felt that he was more short of breath lying flat when his abdominal bulk was compressing his chest   He felt comfortable at 45° elevation  I reviewed this issue with Dr Enedina Johnson from Internal Medicine  We agreed that in the absence of any significant pathology noted on EKG and lab work, we  believe that the symptoms were related to physical deconditioning and a close monitoring at this point will be appropriate  Chronic systolic CHF (congestive heart failure) ([facility] [address] )   Assessment & Plan    We will follow daily weights as well as symptoms and continue the current regimen  He had an episode of shortness of breath during activity and physical therapy on November 1st   During this time, his pulse oximetry remained over 95% and his heart rate remained generally in the normal range as did his blood pressure  He  returned  to comfortable breathing after changes in posture  He continues on Lasix  Troponin today was negative  ProBNP was in the normal range as well       Hypertrophic cardiomyopathy ([address] )   Assessment & Plan    As above, cardiology management is ongoing  We will continue the current medication regimen as he is hemodynamically stable  Atrial fibrillation, chronic ([address] )   Assessment & Plan    I reviewed the progress note from [facility] indicating that the patient should continue amiodarone and metoprolol as well as anticoagulation  He continues on these medications  EKG  indicates sinus rhythm with bundle branch block  He states that he has 2 cardiologists   I did discuss this with the primary care physician Dr Enriquez Bard  The patient has the local cardiologist Dr Brian Ruelas who is directing his care  His cardiologist at [facility] was involved primarily for cardiac dysrhythmia and ablation procedure  I contacted both Cardiology offices and asked them  to contact me with any further input  No new changes have been recommended  Diabetes mellitus type 2 with neurological manifestations [facility])   Assessment & Plan    Lab Results   Component Value Date    HGBA1C 6 3 04/22/2017     The Accu-Chek blood glucose values are variable  The patient was on a stable dose of 70/ 30 insulin, 2 times per day  He is now on a smaller  morning dose of 70 30 insulin starting November 6, (18 units)  Accu-Chek blood glucoses are satisfactory on the current regimen of the 70 30 insulin and the 2 times per day dose of metformin 1000 mg  We will tentatively plan discharge on these medications and follow up with the primary care physician  Recent Labs      11/07/18   1213  11/07/18   1646  11/07/18   2056  11/08/18   0604   POCGLU  140*  119*  116*  120*       Blood Sugar Average: Last 72 hrs:  (P) 033 [id number]     HTN (hypertension)   Assessment & Plan    Blood pressure is adequate on the current regimen  Will continue to monitor the blood pressure  Internal Medicine is consulted for further blood pressure management       Aspiration pneumonitis [facility])   Assessment & Plan    He has evidence of pneumonitis on CT scan and aspiration on video swallow  Speech language pathology has been consulted for swallowing  He is on the modified liquids  Vital-stim is used to improve swallow function  The patient is also performing exercises as directed by speech language pathology  He denies any difficulties with chewing and swallowing now  He hopes to advance past the thickened liquids soon  Video swallow is ordered for November 9  Abnormal gait   Assessment & Plan    As above, the patient's abnormal gait is likely multifactorial in nature and will likely benefit from rehabilitation therapies  Currently, he is most safe with the walker  He is not in favor of using this device  We will continue to work towards using the cane  He ultimately intends to walk with no upper limb assistive device  He has been informed that he has increased risk of fall and injury if he walks with no device as compared to if he uses the cane or the walker  We proceed with rehabilitation therapies to optimize gait quality and safety  Fall (on) (from) unspecified stairs and steps, sequela   Assessment & Plan    The patient has the abnormal gait, likely multifactorial with contributions from parkinsonism and diabetic peripheral polyneuropathy and physical deconditioning and cerebral microangiopathy, among others  He is an appropriate candidate for rehabilitation therapies to improve gait quality and safety  He needs to be independent with mobility including walking at discharge       He currently needs supervision and cues  He recognizes the fact that his gait is inconsistent with occasional inadequate clearance of the foot  We will proceed with a comprehensive rehabilitation program for gait assessment and improvement  * Parkinsonism [facility])   Assessment & Plan    He has the diagnosis of parkinsonism but is on no medications    I will spoke with the primary care physician who prefers to avoid starting Parkinson's medications now but rather utilizing therapy interventions to reduce his Parkinson's symptoms of abnormal gait and falls  I did contact the patient's neurologist and their office indicates that he is not in town but he will contact me in the near future when he returns  Dr Brinda Muñoz of Neurology saw the patient and identifies the Parkinson's symptoms  He suggests that medications may be helpful but also notes the potential adverse impact on cardiac rhythm  The patient prefers no medicines now  We will not  start any new medications now  I reminded the patient that he must follow up on this issue with his various physicians after discharge  Subjective/Objective     Subjective: The patient reports feeling fair this morning  He reports difficulty with sleeping and a restless night  He denies any new medical problems  He reports adequate bowel and bladder function  He reports adequate appetite  He denies any significant coughing or difficulty with chewing and swallowing  He denies any new chest pain or shortness of breath or other EENT, ophthalmological, cardiac, pulmonary, GI, , hematologic, dermatologic, psychiatric, neurologic, and musculoskeletal difficulties other than those consistent with the past medical history and history of present illness  I discussed gait quality and safety with the patient  I pointed out that he still has the risk of falling and she use an upper limb assistive device for balance and safety  I also reminded him that he continues to have difficulty with clearance of the feet and occasional shuffling  I noted that this increases the risk for falls and that he must be more mindful of his walking quality for safety reasons  He understands and agrees  Objective:  Vitals: Blood pressure 126/67, pulse 62, temperature 97 5 °F (36 4 °C), temperature source Temporal, resp  rate 18, height 6' 5" (1 956 m), weight (!) 146 kg (321 lb 6 9 oz), SpO2 95 %  ,Body mass index is 38 12 kg/m²      No intake or output data in the 24 hours ending 11/08/18 0905    Invasive Devices          No matching active lines, drains, or airways          Physical Exam: Vital signs are noted  The head is normocephalic without evidence of trauma  Neck has functional range of motion  Mucous membranes are moist without exudate  Cardiac rhythm is regular with no ectopy  Lungs are clear without wheeze or rhonchi  Abdomen is soft and nontender and obese  Limb examination reveals no significant edema in the lower limbs  Both calves are nontender  Degenerative changes are stable and present  Neurologic examination is also stable with parkinsonian features  Lab, Imaging and other studies: I have personally reviewed pertinent reports      VTE Pharmacologic Prophylaxis: Reason for no pharmacologic prophylaxis He is on Xarelto  VTE Mechanical Prophylaxis: reason for no mechanical VTE prophylaxis He is on Xarelto

## 2018-11-08 NOTE — ASSESSMENT & PLAN NOTE
Lab Results   Component Value Date    HGBA1C 6 3 04/22/2017     The Accu-Chek blood glucose values are variable  The patient was on a stable dose of 70/ 30 insulin, 2 times per day  He is now on a smaller  morning dose of 70 30 insulin starting November 6, (18 units)  Accu-Chek blood glucoses are satisfactory on the current regimen of the 70 30 insulin and the 2 times per day dose of metformin 1000 mg  We will tentatively plan discharge on these medications and follow up with the primary care physician    Recent Labs      11/07/18   1213  11/07/18   1646  11/07/18   2056  11/08/18   0604   POCGLU  140*  119*  116*  120*       Blood Sugar Average: Last 72 hrs:  (P) 989 [id number]

## 2018-11-08 NOTE — ASSESSMENT & PLAN NOTE
He had an episode of shortness of breath during physical therapy November 1st  He reported increased effort of breathing but denied chest pain or shortness of breath or nausea or diaphoresis  During this time, the pulse oximetry was generally around 90%  The heart rate remained between 60 in 80  The blood pressure was in the acceptable ranges well  His symptoms gradually resolved with change in posture  He noted that he felt more short of breath when he was sitting upright and felt that his chest was being compressed by his abdominal bulk  He also felt that he was more short of breath lying flat when his abdominal bulk was compressing his chest   He felt comfortable at 45° elevation  I reviewed this issue with Dr Amy Graves from Internal Medicine  We agreed that in the absence of any significant pathology noted on EKG and lab work, we  believe that the symptoms were related to physical deconditioning and a close monitoring at this point will be appropriate

## 2018-11-08 NOTE — ASSESSMENT & PLAN NOTE
He has evidence of pneumonitis on CT scan and aspiration on video swallow  Speech language pathology has been consulted for swallowing  He is on the modified liquids  Vital-stim is used to improve swallow function  The patient is also performing exercises as directed by speech language pathology  He denies any difficulties with chewing and swallowing now  He hopes to advance past the thickened liquids soon  Video swallow is ordered for November 9

## 2018-11-08 NOTE — ASSESSMENT & PLAN NOTE
We will follow daily weights as well as symptoms and continue the current regimen  He had an episode of shortness of breath during activity and physical therapy on November 1st   During this time, his pulse oximetry remained over 95% and his heart rate remained generally in the normal range as did his blood pressure  He  returned  to comfortable breathing after changes in posture  He continues on Lasix  Troponin today was negative  ProBNP was in the normal range as well

## 2018-11-08 NOTE — ASSESSMENT & PLAN NOTE
I reviewed the progress note from [facility] indicating that the patient should continue amiodarone and metoprolol as well as anticoagulation  He continues on these medications  EKG  indicates sinus rhythm with bundle branch block  He states that he has 2 cardiologists   I did discuss this with the primary care physician Dr Tamika Mcintyre  The patient has the local cardiologist Dr Blanca Nicole who is directing his care  His cardiologist at [facility] was involved primarily for cardiac dysrhythmia and ablation procedure  I contacted both Cardiology offices and asked them  to contact me with any further input  No new changes have been recommended

## 2018-11-08 NOTE — ASSESSMENT & PLAN NOTE
He has the diagnosis of parkinsonism but is on no medications  I will spoke with the primary care physician who prefers to avoid starting Parkinson's medications now but rather utilizing therapy interventions to reduce his Parkinson's symptoms of abnormal gait and falls  I did contact the patient's neurologist and their office indicates that he is not in town but he will contact me in the near future when he returns  Dr Maggie Ardon of Neurology saw the patient and identifies the Parkinson's symptoms  He suggests that medications may be helpful but also notes the potential adverse impact on cardiac rhythm  The patient prefers no medicines now  We will not  start any new medications now  I reminded the patient that he must follow up on this issue with his various physicians after discharge

## 2018-11-08 NOTE — ASSESSMENT & PLAN NOTE
The patient has the abnormal gait, likely multifactorial with contributions from parkinsonism and diabetic peripheral polyneuropathy and physical deconditioning and cerebral microangiopathy, among others  He is an appropriate candidate for rehabilitation therapies to improve gait quality and safety  He needs to be independent with mobility including walking at discharge       He currently needs supervision and cues  He recognizes the fact that his gait is inconsistent with occasional inadequate clearance of the foot  We will proceed with a comprehensive rehabilitation program for gait assessment and improvement

## 2018-11-08 NOTE — PLAN OF CARE
Problem: Potential for Falls  Goal: Patient will remain free of falls  INTERVENTIONS:  - Assess patient frequently for physical needs  -  Identify cognitive and physical deficits and behaviors that affect risk of falls    -  [place] fall precautions as indicated by assessment   - Educate patient/family on patient safety including physical limitations  - Instruct patient to call for assistance with activity based on assessment  - Modify environment to reduce risk of injury  - Consider OT/PT consult to assist with strengthening/mobility   Outcome: Progressing      Problem: MUSCULOSKELETAL - ADULT  Goal: Maintain or return mobility to safest level of function  INTERVENTIONS:  - Assess patient's ability to carry out ADLs; assess patient's baseline for ADL function and identify physical deficits which impact ability to perform ADLs (bathing, care of mouth/teeth, toileting, grooming, dressing, etc )  - Assess/evaluate cause of self-care deficits   - Assess range of motion  - Assess patient's mobility; develop plan if impaired  - Assess patient's need for assistive devices and provide as appropriate  - Encourage maximum independence but intervene and supervise when necessary  - Involve family in performance of ADLs  - Assess for home care needs following discharge   - Request OT consult to assist with ADL evaluation and planning for discharge  - Provide patient education as appropriate   Outcome: Progressing    Goal: Maintain proper alignment of affected body part  INTERVENTIONS:  - Support, maintain and protect limb and body alignment  - Provide pt/fam with appropriate education   Outcome: Progressing      Problem: PAIN - ADULT  Goal: Verbalizes/displays adequate comfort level or baseline comfort level  Interventions:  - Encourage patient to monitor pain and request assistance  - Assess pain using appropriate pain scale  - Administer analgesics based on type and severity of pain and evaluate response  - Implement non-pharmacological measures as appropriate and evaluate response  - Consider cultural and social influences on pain and pain management  - Notify physician/advanced practitioner if interventions unsuccessful or patient reports new pain   Outcome: Progressing      Problem: SAFETY ADULT  Goal: Maintain or return to baseline ADL function  INTERVENTIONS:  -  Assess patient's ability to carry out ADLs; assess patient's baseline for ADL function and identify physical deficits which impact ability to perform ADLs (bathing, care of mouth/teeth, toileting, grooming, dressing, etc )  - Assess/evaluate cause of self-care deficits   - Assess range of motion  - Assess patient's mobility; develop plan if impaired  - Assess patient's need for assistive devices and provide as appropriate  - Encourage maximum independence but intervene and supervise when necessary  ¯ Involve family in performance of ADLs  ¯ Assess for home care needs following discharge   ¯ Request OT consult to assist with ADL evaluation and planning for discharge  ¯ Provide patient education as appropriate   Outcome: Progressing    Goal: Maintain or return mobility status to optimal level  INTERVENTIONS:  - Assess patient's baseline mobility status (ambulation, transfers, stairs, etc )    - Identify cognitive and physical deficits and behaviors that affect mobility  - Identify mobility aids required to assist with transfers and/or ambulation (gait belt, sit-to-stand, lift, walker, cane, etc )  - [place] fall precautions as indicated by assessment  - Record patient progress and toleration of activity level on Mobility SBAR; progress patient to next Phase/Stage  - Instruct patient to call for assistance with activity based on assessment  - Request Rehabilitation consult to assist with strengthening/weightbearing, etc    Outcome: Progressing      Problem: DISCHARGE PLANNING  Goal: Discharge to home or other facility with appropriate resources  INTERVENTIONS:  - Identify barriers to discharge w/patient and caregiver  - Arrange for needed discharge resources and transportation as appropriate  - Identify discharge learning needs (meds, wound care, etc )  - Arrange for interpretive services to assist at discharge as needed  - Refer to Case Management Department for coordinating discharge planning if the patient needs post-hospital services based on physician/advanced practitioner order or complex needs related to functional status, cognitive ability, or social support system   Outcome: Progressing      Problem: Prexisting or High Potential for Compromised Skin Integrity  Goal: Skin integrity is maintained or improved  INTERVENTIONS:  - Identify patients at risk for skin breakdown  - Assess and monitor skin integrity  - Assess and monitor nutrition and hydration status  - Monitor labs (i e  albumin)  - Assess for incontinence   - Turn and reposition patient  - Assist with mobility/ambulation  - Relieve pressure over bony prominences  - Avoid friction and shearing  - Provide appropriate hygiene as needed including keeping skin clean and dry  - Evaluate need for skin moisturizer/barrier cream  - Collaborate with interdisciplinary team (i e  Nutrition, Rehabilitation, etc )   - Patient/family teaching   Outcome: Progressing

## 2018-11-09 ENCOUNTER — APPOINTMENT (OUTPATIENT)
Dept: RADIOLOGY | Facility: HOSPITAL | Age: 68
End: 2018-11-09

## 2018-11-09 VITALS
RESPIRATION RATE: 18 BRPM | OXYGEN SATURATION: 96 % | DIASTOLIC BLOOD PRESSURE: 62 MMHG | TEMPERATURE: 97.9 F | HEIGHT: 77 IN | HEART RATE: 62 BPM | WEIGHT: 315 LBS | SYSTOLIC BLOOD PRESSURE: 120 MMHG | BODY MASS INDEX: 37.19 KG/M2

## 2018-11-09 LAB
GLUCOSE SERPL-MCNC: 114 MG/DL (ref 70–99)
GLUCOSE SERPL-MCNC: 114 MG/DL (ref 70–99)
GLUCOSE SERPL-MCNC: 130 MG/DL (ref 70–99)

## 2018-11-09 PROCEDURE — 82948 REAGENT STRIP/BLOOD GLUCOSE: CPT

## 2018-11-09 PROCEDURE — 74230 X-RAY XM SWLNG FUNCJ C+: CPT

## 2018-11-09 RX ORDER — INSULIN ASPART 100 [IU]/ML
18 INJECTION, SUSPENSION SUBCUTANEOUS EVERY MORNING
Qty: 1000 UNITS | Refills: 0 | Status: ON HOLD | OUTPATIENT
Start: 2018-11-09 | End: 2019-02-12

## 2018-11-09 RX ORDER — AMIODARONE HYDROCHLORIDE 200 MG/1
200 TABLET ORAL
Qty: 30 TABLET | Refills: 0 | Status: ON HOLD | OUTPATIENT
Start: 2018-11-10 | End: 2019-02-12

## 2018-11-09 RX ORDER — POLYETHYLENE GLYCOL 3350 17 G/17G
17 POWDER, FOR SOLUTION ORAL DAILY
Qty: 14 EACH | Refills: 0 | Status: SHIPPED | OUTPATIENT
Start: 2018-11-10

## 2018-11-09 RX ORDER — METOPROLOL SUCCINATE 100 MG/1
100 TABLET, EXTENDED RELEASE ORAL DAILY
Qty: 30 TABLET | Refills: 0 | Status: SHIPPED | OUTPATIENT
Start: 2018-11-10 | End: 2020-02-19 | Stop reason: HOSPADM

## 2018-11-09 RX ORDER — TAMSULOSIN HYDROCHLORIDE 0.4 MG/1
0.4 CAPSULE ORAL
Qty: 30 CAPSULE | Refills: 0 | Status: SHIPPED | OUTPATIENT
Start: 2018-11-09 | End: 2020-02-19 | Stop reason: HOSPADM

## 2018-11-09 RX ORDER — LANOLIN ALCOHOL/MO/W.PET/CERES
100 CREAM (GRAM) TOPICAL DAILY
Qty: 30 TABLET | Refills: 0 | Status: ON HOLD | OUTPATIENT
Start: 2018-11-10 | End: 2019-02-12

## 2018-11-09 RX ADMIN — INSULIN ASPART 18 UNITS: 100 INJECTION, SUSPENSION SUBCUTANEOUS at 08:09

## 2018-11-09 RX ADMIN — BRIMONIDINE TARTRATE 1 DROP: 1.5 SOLUTION OPHTHALMIC at 08:04

## 2018-11-09 RX ADMIN — POLYETHYLENE GLYCOL 3350 17 G: 17 POWDER, FOR SOLUTION ORAL at 08:06

## 2018-11-09 RX ADMIN — METOPROLOL SUCCINATE 100 MG: 50 TABLET, EXTENDED RELEASE ORAL at 08:05

## 2018-11-09 RX ADMIN — Medication 1 TABLET: at 08:04

## 2018-11-09 RX ADMIN — AMIODARONE HYDROCHLORIDE 200 MG: 200 TABLET ORAL at 08:05

## 2018-11-09 RX ADMIN — LISINOPRIL 10 MG: 10 TABLET ORAL at 08:05

## 2018-11-09 RX ADMIN — THIAMINE HCL TAB 100 MG 100 MG: 100 TAB at 08:09

## 2018-11-09 RX ADMIN — MAGNESIUM OXIDE TAB 400 MG (241.3 MG ELEMENTAL MG) 400 MG: 400 (241.3 MG) TAB at 08:07

## 2018-11-09 RX ADMIN — CYANOCOBALAMIN TAB 1000 MCG 1000 MCG: 1000 TAB at 08:05

## 2018-11-09 RX ADMIN — POTASSIUM CHLORIDE 20 MEQ: 750 TABLET, EXTENDED RELEASE ORAL at 08:06

## 2018-11-09 RX ADMIN — ALLOPURINOL 300 MG: 300 TABLET ORAL at 08:05

## 2018-11-09 RX ADMIN — VITAMIN D, TAB 1000IU (100/BT) 1000 UNITS: 25 TAB at 08:05

## 2018-11-09 RX ADMIN — ASPIRIN 81 MG: 81 TABLET, COATED ORAL at 08:06

## 2018-11-09 RX ADMIN — FOLIC ACID 1 MG: 1 TABLET ORAL at 08:06

## 2018-11-09 RX ADMIN — METFORMIN HYDROCHLORIDE 1000 MG: 500 TABLET ORAL at 08:05

## 2018-11-09 RX ADMIN — FUROSEMIDE 40 MG: 40 TABLET ORAL at 08:06

## 2018-11-09 NOTE — PLAN OF CARE
Problem: Potential for Falls  Goal: Patient will remain free of falls  INTERVENTIONS:  - Assess patient frequently for physical needs  -  Identify cognitive and physical deficits and behaviors that affect risk of falls    -  [place] fall precautions as indicated by assessment   - Educate patient/family on patient safety including physical limitations  - Instruct patient to call for assistance with activity based on assessment  - Modify environment to reduce risk of injury  - Consider OT/PT consult to assist with strengthening/mobility   Outcome: Adequate for Discharge      Problem: MUSCULOSKELETAL - ADULT  Goal: Maintain or return mobility to safest level of function  INTERVENTIONS:  - Assess patient's ability to carry out ADLs; assess patient's baseline for ADL function and identify physical deficits which impact ability to perform ADLs (bathing, care of mouth/teeth, toileting, grooming, dressing, etc )  - Assess/evaluate cause of self-care deficits   - Assess range of motion  - Assess patient's mobility; develop plan if impaired  - Assess patient's need for assistive devices and provide as appropriate  - Encourage maximum independence but intervene and supervise when necessary  - Involve family in performance of ADLs  - Assess for home care needs following discharge   - Request OT consult to assist with ADL evaluation and planning for discharge  - Provide patient education as appropriate   Outcome: Adequate for Discharge    Goal: Maintain proper alignment of affected body part  INTERVENTIONS:  - Support, maintain and protect limb and body alignment  - Provide pt/fam with appropriate education   Outcome: Adequate for Discharge      Problem: PAIN - ADULT  Goal: Verbalizes/displays adequate comfort level or baseline comfort level  Interventions:  - Encourage patient to monitor pain and request assistance  - Assess pain using appropriate pain scale  - Administer analgesics based on type and severity of pain and evaluate response  - Implement non-pharmacological measures as appropriate and evaluate response  - Consider cultural and social influences on pain and pain management  - Notify physician/advanced practitioner if interventions unsuccessful or patient reports new pain   Outcome: Adequate for Discharge      Problem: SAFETY ADULT  Goal: Maintain or return to baseline ADL function  INTERVENTIONS:  -  Assess patient's ability to carry out ADLs; assess patient's baseline for ADL function and identify physical deficits which impact ability to perform ADLs (bathing, care of mouth/teeth, toileting, grooming, dressing, etc )  - Assess/evaluate cause of self-care deficits   - Assess range of motion  - Assess patient's mobility; develop plan if impaired  - Assess patient's need for assistive devices and provide as appropriate  - Encourage maximum independence but intervene and supervise when necessary  ¯ Involve family in performance of ADLs  ¯ Assess for home care needs following discharge   ¯ Request OT consult to assist with ADL evaluation and planning for discharge  ¯ Provide patient education as appropriate   Outcome: Adequate for Discharge    Goal: Maintain or return mobility status to optimal level  INTERVENTIONS:  - Assess patient's baseline mobility status (ambulation, transfers, stairs, etc )    - Identify cognitive and physical deficits and behaviors that affect mobility  - Identify mobility aids required to assist with transfers and/or ambulation (gait belt, sit-to-stand, lift, walker, cane, etc )  - [place] fall precautions as indicated by assessment  - Record patient progress and toleration of activity level on Mobility SBAR; progress patient to next Phase/Stage  - Instruct patient to call for assistance with activity based on assessment  - Request Rehabilitation consult to assist with strengthening/weightbearing, etc    Outcome: Adequate for Discharge      Problem: DISCHARGE PLANNING  Goal: Discharge to home or other facility with appropriate resources  INTERVENTIONS:  - Identify barriers to discharge w/patient and caregiver  - Arrange for needed discharge resources and transportation as appropriate  - Identify discharge learning needs (meds, wound care, etc )  - Arrange for interpretive services to assist at discharge as needed  - Refer to Case Management Department for coordinating discharge planning if the patient needs post-hospital services based on physician/advanced practitioner order or complex needs related to functional status, cognitive ability, or social support system   Outcome: Adequate for Discharge      Problem: Prexisting or High Potential for Compromised Skin Integrity  Goal: Skin integrity is maintained or improved  INTERVENTIONS:  - Identify patients at risk for skin breakdown  - Assess and monitor skin integrity  - Assess and monitor nutrition and hydration status  - Monitor labs (i e  albumin)  - Assess for incontinence   - Turn and reposition patient  - Assist with mobility/ambulation  - Relieve pressure over bony prominences  - Avoid friction and shearing  - Provide appropriate hygiene as needed including keeping skin clean and dry  - Evaluate need for skin moisturizer/barrier cream  - Collaborate with interdisciplinary team (i e  Nutrition, Rehabilitation, etc )   - Patient/family teaching   Outcome: Completed Date Met: 11/09/18

## 2018-11-09 NOTE — NURSING NOTE
All discharge instructions reviewed with the pt as well as all medications and information given on each medication in discharge folder  Fall precautions and safety were discussed  Pt is on thin liquids now and reminded to take small sips of thin liquids  Not for employer in the discharge folder, follow up appointments were adjusted per pt wishes  Prescription for out patient therapy is in the folder  All belongings with the pt, wife will transport to home today

## 2018-11-09 NOTE — PROCEDURES
Speech-Language Pathology Videofluoroscopic Swallow Study      Patient Name: Mayda Cunha    LDLXV'W Date: 11/9/2018     Problem List  Patient Active Problem List   Diagnosis    Failed total right knee replacement ([place] [address] )    Fall (on) (from) unspecified stairs and steps, sequela    Abnormal gait    Aspiration pneumonitis ([place] [address] )    Parkinsonism ([place] [address] )    HTN (hypertension)    Diabetes mellitus type 2 with neurological manifestations ([place] [address] )    Atrial fibrillation, chronic ([place] [address] )    Hypertrophic cardiomyopathy ([place] [address] )    Chronic systolic CHF (congestive heart failure) ([place] [address] )    Dyspnea on exertion       Past Medical History  Past Medical History:   Diagnosis Date    A-fib ([place] [address] )     Arthritis     Balance problems     sometimes    [facility]     CPAP (continuous positive airway pressure) dependence     Diabetes mellitus ([place] [address] )     Diabetic neuropathy ([facility])     bilat feet    Glaucoma     bilat    Gout     Hard of hearing     doesn t use his hearing aids    Hiatal hernia     History of total right knee replacement     History of total shoulder replacement     left    Hyperlipidemia     Hypertension     Knee pain, left     Knee pain, right     Obesity     Sleep apnea     Use of cane as ambulatory aid     Wears glasses        Past Surgical History  Past Surgical History:   Procedure Laterality Date    CARDIAC CATHETERIZATION      x2    CARDIAC CATHETERIZATION      COLONOSCOPY      COLONOSCOPY N/A 7/15/2016    Procedure: COLONOSCOPY;  Surgeon: Nikos Estes MD;  Location: [facility];   Service:     EYE SURGERY      laser eye surgery    FOOT SURGERY Left     HIATAL HERNIA REPAIR      JOINT REPLACEMENT      left shoulder/right knee    GA REVISE KNEE JOINT REPLACE,1 PART Right 4/21/2017    Procedure: KNEE REVISION PARTIAL VERSUS COMPLETE REVISION;  Surgeon: Tameka Gutierrez MD;  Location: [facility];  Service: Orthopedics    WISDOM TOOTH EXTRACTION           General Information;  Pt is a 77 y o  male admitted to [facility] s/p fall at home  Patient had VBS during stay at [facility]  Results indicated silent aspiration with thin liquids  The patient has been receiving NMES therapy and is on a regular diet with nectar thick liquids  VBS repeated today to r/o aspiration and make appropriate diet recommendations for discharge home today  Pt was viewed in lateral position and was given trials of pureed, soft moist (canned peaches) and solid food (emeka cracker) as well as honey (moderately thick), nectar (mildly thick), thin liquid  Oral stage; Pt presented with mild oral stage dysphagia  Mastication and transfer time were minimally prolonged with mechanical soft and regular solids  Some oral residue and statis is observed, which is eventually cleared with liquid wash  Oral control appeared adequate however, some gross premature spillage over the base of tongue is observed  Pharyngeal stage; Pt presented with mild pharyngeal dysphagia  Swallowing initiation time was minimally delayed with mechanical soft and regular solids and with all liquids  Spillage to the valleculae is observed with all and to the pyriforms with nectar and thin liquids  Transient penetration is observed with nectar thick liquids  Aspiration x1 , with a cough response, is seen with rapid, consecutive cup sips of thin liquids  No aspiration is noted when patient takes small, single sips of thin liquids  Strategies and Efficacy: Small, single sips of thin liquids eliminated aspiration    Aspiration Response and Efficacy:  Cough is observed     Esophageal stage;  Esophageal screening was not completed  Assessment Summary; Pt presents with mild oropharyngeal dysphagia characterized by prolonged mastication and minimal oral residue with aspiration of thin liquids with large sips      Recommendations: regular diet and thin liquids     Recommended Form of Meds: whole with liquid     Aspiration precautions and compensatory swallowing strategies: small bites/sips    Results Reviewed with: patient and MD     Consider referral to:  Outpatient ST

## 2018-12-17 NOTE — ASSESSMENT & PLAN NOTE
Lab Results   Component Value Date    HGBA1C 6 3 04/22/2017     The Accu-Chek blood glucose values are variable  The patient was on a stable dose of 70/ 30 insulin, 2 times per day  He is currently on Lantus with coverage  I will review with Internal Medicine and plan to reestablish the patient's regimen of  70 /30 insulin  I discussed this with the patient's primary care physician, Dr Sagrario Berger, and he is in agreement  I am starting the morning dose of 70 30 insulin on November 6, 18 units    Recent Labs      11/04/18 2057 11/05/18   0634  11/05/18   1153  11/05/18   1703   POCGLU  135*  98  120*  103*       Blood Sugar Average: Last 72 hrs:  (P) 127 4 Patient due for fasting office visit- 30 days supply given.  Routing to team to schedule appointment     Isa Mccarthy RN  [facility]  [phone]

## 2019-01-18 ENCOUNTER — TELEPHONE (OUTPATIENT)
Dept: NEUROLOGY | Facility: CLINIC | Age: 69
End: 2019-01-18

## 2019-02-12 ENCOUNTER — HOSPITAL ENCOUNTER (OUTPATIENT)
Facility: HOSPITAL | Age: 69
Discharge: HOME | End: 2019-02-23
Attending: PHYSICAL MEDICINE & REHABILITATION | Admitting: PHYSICAL MEDICINE & REHABILITATION

## 2019-02-12 DIAGNOSIS — I48.20 ATRIAL FIBRILLATION, CHRONIC (HCC): ICD-10-CM

## 2019-02-12 DIAGNOSIS — R26.9 ABNORMAL GAIT: ICD-10-CM

## 2019-02-12 DIAGNOSIS — G20 PARKINSON'S DISEASE (HCC): ICD-10-CM

## 2019-02-12 DIAGNOSIS — K59.00 CONSTIPATED: ICD-10-CM

## 2019-02-12 DIAGNOSIS — H53.2 DIPLOPIA: ICD-10-CM

## 2019-02-12 DIAGNOSIS — I42.2 HYPERTROPHIC CARDIOMYOPATHY (HCC): ICD-10-CM
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
## 2019-02-12 DIAGNOSIS — I10 ESSENTIAL HYPERTENSION: Primary | ICD-10-CM

## 2019-02-12 LAB
GLUCOSE SERPL-MCNC: 124 MG/DL (ref 65–140)
GLUCOSE SERPL-MCNC: 133 MG/DL (ref 65–140)
GLUCOSE SERPL-MCNC: 143 MG/DL (ref 65–140)

## 2019-02-12 PROCEDURE — 82948 REAGENT STRIP/BLOOD GLUCOSE: CPT

## 2019-02-12 RX ORDER — ASPIRIN 81 MG/1
81 TABLET, CHEWABLE ORAL DAILY
Status: DISCONTINUED | OUTPATIENT
Start: 2019-02-12 | End: 2019-02-23 | Stop reason: HOSPADM

## 2019-02-12 RX ORDER — INSULIN ASPART 100 [IU]/ML
8 INJECTION, SUSPENSION SUBCUTANEOUS
Status: DISCONTINUED | OUTPATIENT
Start: 2019-02-13 | End: 2019-02-15

## 2019-02-12 RX ORDER — NYSTATIN 100000 [USP'U]/G
POWDER TOPICAL 2 TIMES DAILY
Status: DISCONTINUED | OUTPATIENT
Start: 2019-02-12 | End: 2019-02-23 | Stop reason: HOSPADM

## 2019-02-12 RX ORDER — LANOLIN ALCOHOL/MO/W.PET/CERES
1000 CREAM (GRAM) TOPICAL DAILY
Status: DISCONTINUED | OUTPATIENT
Start: 2019-02-12 | End: 2019-02-23 | Stop reason: HOSPADM

## 2019-02-12 RX ORDER — ATORVASTATIN CALCIUM 40 MG/1
40 TABLET, FILM COATED ORAL EVERY EVENING
Status: DISCONTINUED | OUTPATIENT
Start: 2019-02-12 | End: 2019-02-23 | Stop reason: HOSPADM

## 2019-02-12 RX ORDER — POTASSIUM CHLORIDE 20 MEQ/1
20 TABLET, EXTENDED RELEASE ORAL DAILY
Status: DISCONTINUED | OUTPATIENT
Start: 2019-02-12 | End: 2019-02-23 | Stop reason: HOSPADM

## 2019-02-12 RX ORDER — MELATONIN
1000 DAILY
Status: DISCONTINUED | OUTPATIENT
Start: 2019-02-12 | End: 2019-02-23 | Stop reason: HOSPADM

## 2019-02-12 RX ORDER — POLYETHYLENE GLYCOL 3350 17 G/17G
17 POWDER, FOR SOLUTION ORAL DAILY
Status: DISCONTINUED | OUTPATIENT
Start: 2019-02-12 | End: 2019-02-23 | Stop reason: HOSPADM

## 2019-02-12 RX ORDER — BRIMONIDINE TARTRATE 0.15 %
1 DROPS OPHTHALMIC (EYE) DAILY
Status: DISCONTINUED | OUTPATIENT
Start: 2019-02-12 | End: 2019-02-23 | Stop reason: HOSPADM

## 2019-02-12 RX ORDER — METOPROLOL SUCCINATE 50 MG/1
100 TABLET, EXTENDED RELEASE ORAL DAILY
Status: DISCONTINUED | OUTPATIENT
Start: 2019-02-12 | End: 2019-02-23 | Stop reason: HOSPADM

## 2019-02-12 RX ORDER — LISINOPRIL 10 MG/1
10 TABLET ORAL DAILY
Status: DISCONTINUED | OUTPATIENT
Start: 2019-02-13 | End: 2019-02-23

## 2019-02-12 RX ORDER — LISINOPRIL 10 MG/1
10 TABLET ORAL DAILY
Status: DISCONTINUED | OUTPATIENT
Start: 2019-02-12 | End: 2019-02-12

## 2019-02-12 RX ORDER — FUROSEMIDE 20 MG/1
20 TABLET ORAL DAILY
Status: DISCONTINUED | OUTPATIENT
Start: 2019-02-12 | End: 2019-02-23 | Stop reason: HOSPADM

## 2019-02-12 RX ORDER — MINERAL OIL AND PETROLATUM 150; 830 MG/G; MG/G
OINTMENT OPHTHALMIC EVERY 2 HOUR PRN
Status: DISCONTINUED | OUTPATIENT
Start: 2019-02-12 | End: 2019-02-15

## 2019-02-12 RX ORDER — ACETAMINOPHEN 325 MG/1
325 TABLET ORAL EVERY 4 HOURS PRN
Status: DISCONTINUED | OUTPATIENT
Start: 2019-02-12 | End: 2019-02-23 | Stop reason: HOSPADM

## 2019-02-12 RX ORDER — ALLOPURINOL 300 MG/1
300 TABLET ORAL DAILY
Status: DISCONTINUED | OUTPATIENT
Start: 2019-02-12 | End: 2019-02-23 | Stop reason: HOSPADM

## 2019-02-12 RX ORDER — TAMSULOSIN HYDROCHLORIDE 0.4 MG/1
0.4 CAPSULE ORAL
Status: DISCONTINUED | OUTPATIENT
Start: 2019-02-12 | End: 2019-02-23 | Stop reason: HOSPADM

## 2019-02-12 RX ORDER — MECLIZINE HCL 12.5 MG/1
12.5 TABLET ORAL EVERY 8 HOURS PRN
Status: DISCONTINUED | OUTPATIENT
Start: 2019-02-12 | End: 2019-02-23 | Stop reason: HOSPADM

## 2019-02-12 RX ADMIN — ATORVASTATIN CALCIUM 40 MG: 40 TABLET, FILM COATED ORAL at 17:25

## 2019-02-12 RX ADMIN — BIMATOPROST 1 DROP: 0.1 SOLUTION/ DROPS OPHTHALMIC at 21:21

## 2019-02-12 RX ADMIN — TAMSULOSIN HYDROCHLORIDE 0.4 MG: 0.4 CAPSULE ORAL at 17:24

## 2019-02-12 RX ADMIN — METFORMIN HYDROCHLORIDE 1000 MG: 500 TABLET ORAL at 17:24

## 2019-02-12 RX ADMIN — RIVAROXABAN 20 MG: 20 TABLET, FILM COATED ORAL at 17:24

## 2019-02-12 RX ADMIN — CARBIDOPA AND LEVODOPA 0.5 TABLET: 25; 100 TABLET ORAL at 14:04

## 2019-02-12 RX ADMIN — NYSTATIN: 100000 POWDER TOPICAL at 17:25

## 2019-02-12 NOTE — NURSING NOTE
Pt is alert and oriented, he has flat affect, is cooperative with care  Pt arrived to the floor at 1045, he came from [facility]  Report was received from RN  Pt is on room air, his lungs are clear and diminished, he denies pain  Heart rate is regular, trace edema noted to feet  Pulses are palpable  Pt has scabbed abrasion on L 2nd toe that pt is unable to state how long he has had it  RN called his wife but was unable to give information  RN from [facility] stated he had it when he came into the hospital  It is open to air, intact  Blood glucose was 124, pt was given lunch  Call bell is in reach, bed in lowest position

## 2019-02-12 NOTE — PLAN OF CARE
Problem: Potential for Falls  Goal: Patient will remain free of falls  Description  INTERVENTIONS:  - Assess patient frequently for physical needs  -  Identify cognitive and physical deficits and behaviors that affect risk of falls    -  [place] fall precautions as indicated by assessment   - Educate patient/family on patient safety including physical limitations  - Instruct patient to call for assistance with activity based on assessment  - Modify environment to reduce risk of injury  - Consider OT/PT consult to assist with strengthening/mobility  2/12/2019 1458 by Christiano Anglin RN  Outcome: Progressing  2/12/2019 1457 by Christiano Anglin RN  Outcome: Progressing  2/12/2019 1455 by Christiano Anglin RN  Outcome: Progressing     Problem: NEUROSENSORY - ADULT  Goal: Achieves maximal functionality and self care  Description  INTERVENTIONS  - Monitor swallowing and airway patency with patient fatigue and changes in neurological status  - Encourage and assist patient to increase activity and self care with guidance from rehab services  - Encourage visually impaired, hearing impaired and aphasic patients to use assistive/communication devices  2/12/2019 1458 by Christiano Anglin RN  Outcome: Progressing  2/12/2019 1457 by Christiano Anglin RN  Outcome: Progressing  2/12/2019 1455 by Christiano Anglin RN  Outcome: Progressing     Problem: RESPIRATORY - ADULT  Goal: Achieves optimal ventilation and oxygenation  Description  INTERVENTIONS:  - Assess for changes in respiratory status  - Assess for changes in mentation and behavior  - Position to facilitate oxygenation and minimize respiratory effort  - Oxygen administration by appropriate delivery method based on oxygen saturation (per order) or ABGs  - Initiate smoking cessation education as indicated  - Encourage broncho-pulmonary hygiene including cough, deep breathe, Incentive Spirometry  - Assess the need for suctioning and aspirate as needed  - Assess and instruct to report SOB or any respiratory difficulty  - Respiratory Therapy support as indicated  2/12/2019 1458 by Christiano Anglin RN  Outcome: Progressing  2/12/2019 1457 by Christiano Anglin RN  Outcome: Progressing  2/12/2019 1455 by Christiano Anglin RN  Outcome: Progressing     Problem: GASTROINTESTINAL - ADULT  Goal: Maintains or returns to baseline bowel function  Description  INTERVENTIONS:  - Assess bowel function  - Encourage oral fluids to ensure adequate hydration  - Administer IV fluids as ordered to ensure adequate hydration  - Administer ordered medications as needed  - Encourage mobilization and activity  - Nutrition services referral to assist patient with appropriate food choices  2/12/2019 1458 by Christiano Anglin RN  Outcome: Progressing  2/12/2019 1457 by Christiano Anglin RN  Outcome: Progressing  2/12/2019 1455 by Christiano Anglin RN  Outcome: Progressing  Goal: Maintains adequate nutritional intake  Description  INTERVENTIONS:  - Monitor percentage of each meal consumed  - Identify factors contributing to decreased intake, treat as appropriate  - Assist with meals as needed  - Monitor I&O, WT and lab values  - Obtain nutrition services referral as needed  2/12/2019 1458 by Christiano Anglin RN  Outcome: Progressing  2/12/2019 1457 by Christiano Anglin RN  Outcome: Progressing  2/12/2019 1455 by Christiano Anglin RN  Outcome: Progressing     Problem: METABOLIC, FLUID AND ELECTROLYTES - ADULT  Goal: Glucose maintained within target range  Description  INTERVENTIONS:  - Monitor Blood Glucose as ordered  - Assess for signs and symptoms of hyperglycemia and hypoglycemia  - Administer ordered medications to maintain glucose within target range  - Assess nutritional intake and initiate nutrition service referral as needed  2/12/2019 1458 by Christiano Anglin RN  Outcome: Progressing  2/12/2019 1457 by Christiano Anglin RN  Outcome: Progressing  2/12/2019 1455 by Christiano Anglin RN  Outcome: Progressing     Problem: SKIN/TISSUE INTEGRITY - ADULT  Goal: Skin integrity remains intact  Description  INTERVENTIONS  - Identify patients at risk for skin breakdown  - Assess and monitor skin integrity  - Assess and monitor nutrition and hydration status  - Monitor labs (i e  albumin)  - Assess for incontinence   - Turn and reposition patient  - Assist with mobility/ambulation  - Relieve pressure over bony prominences  - Avoid friction and shearing  - Provide appropriate hygiene as needed including keeping skin clean and dry  - Evaluate need for skin moisturizer/barrier cream  - Collaborate with interdisciplinary team (i e  Nutrition, Rehabilitation, etc )   - Patient/family teaching  2/12/2019 1458 by Mehran Lai RN  Outcome: Progressing  2/12/2019 1457 by Mehran Lai RN  Outcome: Progressing  2/12/2019 1455 by Mehran Lai RN  Outcome: Progressing  Goal: Incision(s), wounds(s) or drain site(s) healing without S/S of infection  Description  INTERVENTIONS  - Assess and document risk factors for skin impairment   - Assess and document dressing, incision, wound bed, drain sites and surrounding tissue  - Initiate Nutrition services consult and/or wound management as needed  2/12/2019 1458 by Mehran Lai RN  Outcome: Progressing  2/12/2019 1457 by Mehran Lai RN  Outcome: Progressing  2/12/2019 1455 by Mehran Lai RN  Outcome: Progressing     Problem: MUSCULOSKELETAL - ADULT  Goal: Maintain or return mobility to safest level of function  Description  INTERVENTIONS:  - Assess patient's ability to carry out ADLs; assess patient's baseline for ADL function and identify physical deficits which impact ability to perform ADLs (bathing, care of mouth/teeth, toileting, grooming, dressing, etc )  - Assess/evaluate cause of self-care deficits   - Assess range of motion  - Assess patient's mobility; develop plan if impaired  - Assess patient's need for assistive devices and provide as appropriate  - Encourage maximum independence but intervene and supervise when necessary  - Involve family in performance of ADLs  - Assess for home care needs following discharge   - Request OT consult to assist with ADL evaluation and planning for discharge  - Provide patient education as appropriate  2/12/2019 1458 by Hobart Kocher, RN  Outcome: Progressing  2/12/2019 1457 by Hobart Kocher, RN  Outcome: Progressing  2/12/2019 1455 by Hobart Kocher, RN  Outcome: Progressing  Goal: Maintain proper alignment of affected body part  Description  INTERVENTIONS:  - Support, maintain and protect limb and body alignment  - Provide pt/fam with appropriate education  2/12/2019 1458 by Hobart Kocher, RN  Outcome: Progressing  2/12/2019 1457 by Hobart Kocher, RN  Outcome: Progressing  2/12/2019 1455 by Hobart Kocher, RN  Outcome: Progressing     Problem: PAIN - ADULT  Goal: Verbalizes/displays adequate comfort level or baseline comfort level  Description  Interventions:  - Encourage patient to monitor pain and request assistance  - Assess pain using appropriate pain scale  - Administer analgesics based on type and severity of pain and evaluate response  - Implement non-pharmacological measures as appropriate and evaluate response  - Consider cultural and social influences on pain and pain management  - Notify physician/advanced practitioner if interventions unsuccessful or patient reports new pain  2/12/2019 1458 by Hobart Kocher, RN  Outcome: Progressing  2/12/2019 1457 by Hobart Kocher, RN  Outcome: Progressing  2/12/2019 1455 by Hobart Kocher, RN  Outcome: Progressing     Problem: SAFETY ADULT  Goal: Maintain or return to baseline ADL function  Description  INTERVENTIONS:  -  Assess patient's ability to carry out ADLs; assess patient's baseline for ADL function and identify physical deficits which impact ability to perform ADLs (bathing, care of mouth/teeth, toileting, grooming, dressing, etc )  - Assess/evaluate cause of self-care deficits   - Assess range of motion  - Assess patient's mobility; develop plan if impaired  - Assess patient's need for assistive devices and provide as appropriate  - Encourage maximum independence but intervene and supervise when necessary  ¯ Involve family in performance of ADLs  ¯ Assess for home care needs following discharge   ¯ Request OT consult to assist with ADL evaluation and planning for discharge  ¯ Provide patient education as appropriate  2/12/2019 1458 by Madonna Riddle RN  Outcome: Progressing  2/12/2019 1457 by Madonna Riddle RN  Outcome: Progressing  2/12/2019 1455 by Madonna Riddle RN  Outcome: Progressing  Goal: Maintain or return mobility status to optimal level  Description  INTERVENTIONS:  - Assess patient's baseline mobility status (ambulation, transfers, stairs, etc )    - Identify cognitive and physical deficits and behaviors that affect mobility  - Identify mobility aids required to assist with transfers and/or ambulation (gait belt, sit-to-stand, lift, walker, cane, etc )  - [place] fall precautions as indicated by assessment  - Record patient progress and toleration of activity level on Mobility SBAR; progress patient to next Phase/Stage  - Instruct patient to call for assistance with activity based on assessment  - Request Rehabilitation consult to assist with strengthening/weightbearing, etc   2/12/2019 1458 by Madonna Riddle RN  Outcome: Progressing  2/12/2019 1457 by Madonna Riddle RN  Outcome: Progressing  2/12/2019 1455 by Madonna Riddle RN  Outcome: Progressing     Problem: DISCHARGE PLANNING  Goal: Discharge to home or other facility with appropriate resources  Description  INTERVENTIONS:  - Identify barriers to discharge w/patient and caregiver  - Arrange for needed discharge resources and transportation as appropriate  - Identify discharge learning needs (meds, wound care, etc )  - Arrange for interpretive services to assist at discharge as needed  - Refer to Case Management Department for coordinating discharge planning if the patient needs post-hospital services based on physician/advanced practitioner order or complex needs related to functional status, cognitive ability, or social support system  2/12/2019 1458 by Madonna Riddle RN  Outcome: Progressing  2/12/2019 1457 by Madonna Riddle RN  Outcome: Progressing  2/12/2019 1455 by Madonna Riddle RN  Outcome: Progressing

## 2019-02-13 LAB
ANION GAP SERPL CALCULATED.3IONS-SCNC: 7 MMOL/L (ref 5–14)
BUN SERPL-MCNC: 12 MG/DL (ref 5–25)
CALCIUM SERPL-MCNC: 9.4 MG/DL (ref 8.4–10.2)
CHLORIDE SERPL-SCNC: 102 MMOL/L (ref 97–108)
CO2 SERPL-SCNC: 29 MMOL/L (ref 22–30)
CREAT SERPL-MCNC: 0.55 MG/DL (ref 0.7–1.5)
ERYTHROCYTE [DISTWIDTH] IN BLOOD BY AUTOMATED COUNT: 13.6 %
GFR SERPL CREATININE-BSD FRML MDRD: 105 ML/MIN/1.73SQ M
GLUCOSE P FAST SERPL-MCNC: 110 MG/DL (ref 70–99)
GLUCOSE SERPL-MCNC: 108 MG/DL (ref 65–140)
GLUCOSE SERPL-MCNC: 110 MG/DL (ref 70–99)
GLUCOSE SERPL-MCNC: 112 MG/DL (ref 65–140)
GLUCOSE SERPL-MCNC: 112 MG/DL (ref 65–140)
GLUCOSE SERPL-MCNC: 93 MG/DL (ref 65–140)
HCT VFR BLD AUTO: 40.7 % (ref 41–53)
HGB BLD-MCNC: 13.1 G/DL (ref 13.5–17.5)
MAGNESIUM SERPL-MCNC: 2 MG/DL (ref 1.6–2.3)
MCH RBC QN AUTO: 30.5 PG (ref 26–34)
MCHC RBC AUTO-ENTMCNC: 32.3 G/DL (ref 31–36)
MCV RBC AUTO: 94 FL (ref 80–100)
PLATELET # BLD AUTO: 225 THOUSANDS/UL (ref 150–450)
PMV BLD AUTO: 8.7 FL (ref 8.9–12.7)
POTASSIUM SERPL-SCNC: 3.7 MMOL/L (ref 3.6–5)
RBC # BLD AUTO: 4.32 MILLION/UL (ref 4.5–5.9)
SODIUM SERPL-SCNC: 138 MMOL/L (ref 137–147)
WBC # BLD AUTO: 6.6 THOUSAND/UL (ref 4.5–11)

## 2019-02-13 PROCEDURE — 83735 ASSAY OF MAGNESIUM: CPT | Performed by: PHYSICAL MEDICINE & REHABILITATION

## 2019-02-13 PROCEDURE — 85027 COMPLETE CBC AUTOMATED: CPT | Performed by: PHYSICAL MEDICINE & REHABILITATION

## 2019-02-13 PROCEDURE — 82948 REAGENT STRIP/BLOOD GLUCOSE: CPT

## 2019-02-13 PROCEDURE — 80048 BASIC METABOLIC PNL TOTAL CA: CPT | Performed by: PHYSICAL MEDICINE & REHABILITATION

## 2019-02-13 RX ADMIN — BRIMONIDINE TARTRATE 1 DROP: 1.5 SOLUTION OPHTHALMIC at 08:03

## 2019-02-13 RX ADMIN — POLYETHYLENE GLYCOL 3350 17 G: 17 POWDER, FOR SOLUTION ORAL at 08:04

## 2019-02-13 RX ADMIN — METFORMIN HYDROCHLORIDE 1000 MG: 500 TABLET ORAL at 08:01

## 2019-02-13 RX ADMIN — ASPIRIN 81 MG 81 MG: 81 TABLET ORAL at 08:03

## 2019-02-13 RX ADMIN — TAMSULOSIN HYDROCHLORIDE 0.4 MG: 0.4 CAPSULE ORAL at 17:49

## 2019-02-13 RX ADMIN — METFORMIN HYDROCHLORIDE 1000 MG: 500 TABLET ORAL at 17:49

## 2019-02-13 RX ADMIN — METOPROLOL SUCCINATE 100 MG: 50 TABLET, EXTENDED RELEASE ORAL at 08:03

## 2019-02-13 RX ADMIN — MAGNESIUM OXIDE TAB 400 MG (241.3 MG ELEMENTAL MG) 400 MG: 400 (241.3 MG) TAB at 08:03

## 2019-02-13 RX ADMIN — NYSTATIN 1 APPLICATION: 100000 POWDER TOPICAL at 17:53

## 2019-02-13 RX ADMIN — CYANOCOBALAMIN TAB 1000 MCG 1000 MCG: 1000 TAB at 08:03

## 2019-02-13 RX ADMIN — POTASSIUM CHLORIDE 20 MEQ: 20 TABLET, EXTENDED RELEASE ORAL at 08:03

## 2019-02-13 RX ADMIN — INSULIN ASPART 8 UNITS: 100 INJECTION, SUSPENSION SUBCUTANEOUS at 08:11

## 2019-02-13 RX ADMIN — CARBIDOPA AND LEVODOPA 0.5 TABLET: 25; 100 TABLET ORAL at 17:12

## 2019-02-13 RX ADMIN — ALLOPURINOL 300 MG: 300 TABLET ORAL at 08:02

## 2019-02-13 RX ADMIN — RIVAROXABAN 20 MG: 20 TABLET, FILM COATED ORAL at 17:49

## 2019-02-13 RX ADMIN — VITAMIN D, TAB 1000IU (100/BT) 1000 UNITS: 25 TAB at 08:03

## 2019-02-13 RX ADMIN — ATORVASTATIN CALCIUM 40 MG: 40 TABLET, FILM COATED ORAL at 17:49

## 2019-02-13 RX ADMIN — FUROSEMIDE 20 MG: 20 TABLET ORAL at 08:14

## 2019-02-13 RX ADMIN — Medication 1 TABLET: at 08:03

## 2019-02-13 RX ADMIN — NYSTATIN: 100000 POWDER TOPICAL at 08:16

## 2019-02-13 RX ADMIN — CARBIDOPA AND LEVODOPA 0.5 TABLET: 25; 100 TABLET ORAL at 06:06

## 2019-02-13 RX ADMIN — BIMATOPROST 1 DROP: 0.1 SOLUTION/ DROPS OPHTHALMIC at 21:36

## 2019-02-13 RX ADMIN — CARBIDOPA AND LEVODOPA 0.5 TABLET: 25; 100 TABLET ORAL at 12:10

## 2019-02-14 PROBLEM — I50.32 CHRONIC DIASTOLIC CHF (CONGESTIVE HEART FAILURE) (HCC): Status: ACTIVE | Noted: 2018-10-30

## 2019-02-14 LAB
GLUCOSE SERPL-MCNC: 105 MG/DL (ref 65–140)
GLUCOSE SERPL-MCNC: 95 MG/DL (ref 65–140)

## 2019-02-14 PROCEDURE — 82948 REAGENT STRIP/BLOOD GLUCOSE: CPT

## 2019-02-14 PROCEDURE — 99221 1ST HOSP IP/OBS SF/LOW 40: CPT | Performed by: FAMILY MEDICINE

## 2019-02-14 RX ORDER — LACTULOSE 20 G/30ML
10 SOLUTION ORAL EVERY 6 HOURS
Status: DISCONTINUED | OUTPATIENT
Start: 2019-02-14 | End: 2019-02-14

## 2019-02-14 RX ADMIN — INSULIN ASPART 8 UNITS: 100 INJECTION, SUSPENSION SUBCUTANEOUS at 08:09

## 2019-02-14 RX ADMIN — METFORMIN HYDROCHLORIDE 1000 MG: 500 TABLET ORAL at 17:59

## 2019-02-14 RX ADMIN — BRIMONIDINE TARTRATE 1 DROP: 1.5 SOLUTION OPHTHALMIC at 08:11

## 2019-02-14 RX ADMIN — BIMATOPROST 1 DROP: 0.1 SOLUTION/ DROPS OPHTHALMIC at 21:03

## 2019-02-14 RX ADMIN — FUROSEMIDE 20 MG: 20 TABLET ORAL at 08:11

## 2019-02-14 RX ADMIN — CARBIDOPA AND LEVODOPA 0.5 TABLET: 25; 100 TABLET ORAL at 16:03

## 2019-02-14 RX ADMIN — METFORMIN HYDROCHLORIDE 1000 MG: 500 TABLET ORAL at 08:10

## 2019-02-14 RX ADMIN — ATORVASTATIN CALCIUM 40 MG: 40 TABLET, FILM COATED ORAL at 18:00

## 2019-02-14 RX ADMIN — CARBIDOPA AND LEVODOPA 0.5 TABLET: 25; 100 TABLET ORAL at 06:01

## 2019-02-14 RX ADMIN — LACTULOSE 10 G: 10 SOLUTION ORAL at 08:11

## 2019-02-14 RX ADMIN — POTASSIUM CHLORIDE 20 MEQ: 20 TABLET, EXTENDED RELEASE ORAL at 08:10

## 2019-02-14 RX ADMIN — Medication 1 TABLET: at 08:10

## 2019-02-14 RX ADMIN — NYSTATIN 1 APPLICATION: 100000 POWDER TOPICAL at 01:00

## 2019-02-14 RX ADMIN — CYANOCOBALAMIN TAB 1000 MCG 1000 MCG: 1000 TAB at 08:11

## 2019-02-14 RX ADMIN — METOPROLOL SUCCINATE 100 MG: 50 TABLET, EXTENDED RELEASE ORAL at 08:10

## 2019-02-14 RX ADMIN — MAGNESIUM OXIDE TAB 400 MG (241.3 MG ELEMENTAL MG) 400 MG: 400 (241.3 MG) TAB at 08:11

## 2019-02-14 RX ADMIN — TAMSULOSIN HYDROCHLORIDE 0.4 MG: 0.4 CAPSULE ORAL at 17:59

## 2019-02-14 RX ADMIN — VITAMIN D, TAB 1000IU (100/BT) 1000 UNITS: 25 TAB at 08:11

## 2019-02-14 RX ADMIN — RIVAROXABAN 20 MG: 20 TABLET, FILM COATED ORAL at 17:59

## 2019-02-14 RX ADMIN — CARBIDOPA AND LEVODOPA 0.5 TABLET: 25; 100 TABLET ORAL at 13:16

## 2019-02-14 RX ADMIN — ALLOPURINOL 300 MG: 300 TABLET ORAL at 08:11

## 2019-02-14 RX ADMIN — ASPIRIN 81 MG 81 MG: 81 TABLET ORAL at 08:10

## 2019-02-14 RX ADMIN — NYSTATIN: 100000 POWDER TOPICAL at 08:14

## 2019-02-14 NOTE — ASSESSMENT & PLAN NOTE
Continue Toprol-XL  And also Xarelto for anticoagulation  Patient used to be on amiodarone in the past which has been discontinued at this time    Also had ablations done in the past

## 2019-02-14 NOTE — ASSESSMENT & PLAN NOTE
He has a history of atrial fibrillation  He was on amiodarone and this was recently discontinued  He continues on the beta-blocker  Will monitor the vital signs and hemodynamic clinical picture  He is on anticoagulation as well

## 2019-02-14 NOTE — CONSULTS
Consult- Noemi Perez 1950, 68 y o  male MRN: [id number]    Unit/Bed#: MINERVA 265-01 Encounter: [id number]    Primary Care Provider: No primary care provider on file  Date and time admitted to hospital: 2/12/2019 10:51 AM      Inpatient consult to Internal Medicine  Consult performed by: Lyndsey Graham MD  Consult ordered by: Trisha Ravi MD          Chronic diastolic CHF (congestive heart failure) [facility])  Assessment & Plan  Stable at this time  Continue Lasix  He is euvolemic  Hypertrophic cardiomyopathy [facility])  Assessment & Plan  Continue medical management  Last echo done on 01/09/2019 shows EF of 50% mild concentric left ventricular hypertrophy with mild hypokinesis of the apex    Atrial fibrillation, chronic (HCC)  Assessment & Plan  Continue Toprol-XL  And also Xarelto for anticoagulation  Patient used to be on amiodarone in the past which has been discontinued at this time  Also had ablations done in the past     Diabetes mellitus type 2 with neurological manifestations [facility])  Assessment & Plan  Lab Results   Component Value Date    HGBA1C 5 9 09/28/2018       Recent Labs     02/13/19  1154 02/13/19  1634 02/13/19  2052 02/14/19  0603   POCGLU 93 108 112 95       Blood Sugar Average: Last 72 hrs:  (P) 115   Blood sugars are very well controlled on current regimen    HTN (hypertension)  Assessment & Plan  Blood pressure is controlled on current medications including lisinopril and Toprol-XL    Aspiration pneumonitis (HCC)  Assessment & Plan  Stable at this time  Last chest x-ray showed some atelectasis    Abnormal gait  Assessment & Plan  Continue physical therapy and occupational therapy  May be contributed by his parkinsonism    * Parkinsonism [facility])  Assessment & Plan  Patient placed on carbidopa levodopa as per Neurology    His myasthenia gravis panel is negative        VTE Prophylaxis: Reason for no pharmacologic prophylaxis On Xarelto  / reason for no mechanical VTE prophylaxis On Xarelto     Recommendations for Discharge:  · None    Counseling / Coordination of Care Time: 1 hour  Greater than 50% of total time spent on patient counseling and coordination of care  Collaboration of Care: Were Recommendations Directly Discussed with Primary Treatment Team? - Yes     History of Present Illness:    Randall Carl is a 68 y o  male who is originally admitted to the physiatry service due to deconditioning and ambulatory dysfunction  We are consulted for medical management  Patient states that he was recently discharged from [facility] after he had about of shortness of breath secondary to aspiration pneumonitis and found to have deconditioning ambulatory dysfunction  It is unclear at the time what was causing it and neurologist suspected might be Parkinson's disease versus drug-induced parkinsonism  Review of Systems:    Review of Systems   Constitutional: Positive for appetite change and fatigue  Negative for chills and fever  HENT: Negative for hearing loss, sore throat and trouble swallowing  Eyes: Negative for photophobia, discharge and visual disturbance  Respiratory: Positive for shortness of breath  Negative for chest tightness  Cardiovascular: Negative for chest pain and palpitations  Gastrointestinal: Negative for abdominal pain, blood in stool and vomiting  Endocrine: Negative for polydipsia and polyuria  Genitourinary: Negative for difficulty urinating, dysuria, flank pain and hematuria  Musculoskeletal: Positive for gait problem  Negative for back pain  Skin: Negative for rash  Allergic/Immunologic: Negative for environmental allergies and food allergies  Neurological: Negative for dizziness, seizures, syncope and headaches  Hematological: Does not bruise/bleed easily  Psychiatric/Behavioral: Negative for behavioral problems  All other systems reviewed and are negative        Past Medical and Surgical History:     Past Medical History:   Diagnosis Date    A-fib ([facility] )     Arthritis     Balance problems     sometimes    CHF (congestive heart failure) ([facility])     CPAP (continuous positive airway pressure) dependence     Diabetes mellitus ([facility] )     Diabetic neuropathy ([facility])     bilat feet    Glaucoma     bilat    Gout     Hard of hearing     doesn t use his hearing aids    Hiatal hernia     History of total right knee replacement     History of total shoulder replacement     left    Hyperlipidemia     Hypertension     Knee pain, left     Knee pain, right     Obesity     Sleep apnea     Use of cane as ambulatory aid     Wears glasses        Past Surgical History:   Procedure Laterality Date    CARDIAC CATHETERIZATION      x2    CARDIAC CATHETERIZATION      COLONOSCOPY      COLONOSCOPY N/A 7/15/2016    Procedure: COLONOSCOPY;  Surgeon: Efren So MD;  Location: [facility]; Service:     EYE SURGERY      laser eye surgery    FOOT SURGERY Left     HIATAL HERNIA REPAIR      JOINT REPLACEMENT      left shoulder/right knee    DE REVISE KNEE JOINT REPLACE,1 PART Right 4/21/2017    Procedure: KNEE REVISION PARTIAL VERSUS COMPLETE REVISION;  Surgeon: Gautam Lopez MD;  Location: [facility];  Service: Orthopedics    WISDOM TOOTH EXTRACTION         Meds/Allergies:    PTA meds:   Prior to Admission Medications   Prescriptions Last Dose Informant Patient Reported? Taking? Bimatoprost (LUMIGAN OP)   Yes No   Sig: Apply 1 drop to eye daily at bedtime Both eyes    Brimonidine Tartrate (ALPHAGAN P OP)   Yes No   Sig: Apply 1 drop to eye 3 (three) times a day Right eye   Multiple Vitamin (MULTIVITAMIN) tablet   Yes No   Sig: Take 1 tablet by mouth daily  allopurinol (ZYLOPRIM) 300 mg tablet   Yes No   Sig: Take 300 mg by mouth daily     aspirin 81 MG tablet   Yes No   Sig: Take 81 mg by mouth daily   atorvastatin (LIPITOR) 40 mg tablet   Yes No   Sig: Take 40 mg by mouth every evening   lisinopril (ZESTRIL) 10 mg tablet   Yes No   Sig: Take 10 mg by mouth daily   magnesium oxide (MAG-OX) 400 mg   Yes No   Sig: Take 400 mg by mouth daily   metFORMIN (GLUCOPHAGE) 1000 MG tablet   Yes No   Sig: Take 1,000 mg by mouth 2 (two) times a day with meals  metoprolol succinate (TOPROL-XL) 100 mg 24 hr tablet   No No   Sig: Take 1 tablet (100 mg total) by mouth daily   polyethylene glycol (MIRALAX) 17 g packet   No No   Sig: Take 17 g by mouth daily   potassium chloride (K-DUR,KLOR-CON) 20 mEq tablet   Yes No   Sig: Take 20 mEq by mouth daily   rivaroxaban (XARELTO) 20 mg tablet   No No   Sig: Take 0 5 tablets by mouth daily with dinner for 7 days After 7 days resume your normal dose of 20 mg daily  tamsulosin (FLOMAX) 0 4 mg   No No   Sig: Take 1 capsule (0 4 mg total) by mouth daily with dinner      Facility-Administered Medications: None       Allergies: No Known Allergies    Social History:     Marital Status: /Civil Union    Substance Use History:   Social History     Substance and Sexual Activity   Alcohol Use Yes    Comment: socially     Social History     Tobacco Use   Smoking Status Never Smoker   Smokeless Tobacco Never Used     Social History     Substance and Sexual Activity   Drug Use No       Family History:    Family History   Problem Relation Age of Onset    Heart disease Father        Physical Exam:     Vitals:   Blood Pressure: 125/68 (02/14/19 0803)  Pulse: 67 (02/14/19 0803)  Temperature: 97 8 °F (36 6 °C) (02/14/19 0803)  Temp Source: Temporal (02/14/19 0803)  Respirations: 20 (02/14/19 0803)  Height: 6' 5" (195 6 cm) (02/12/19 1101)  Weight - Scale: 136 kg (299 lb 6 4 oz) (02/14/19 0600)  SpO2: 94 % (02/14/19 0803)    Physical Exam   Constitutional: He is oriented to person, place, and time  He appears well-developed and well-nourished  HENT:   Head: Normocephalic and atraumatic     Right Ear: External ear normal    Left Ear: External ear normal    Mouth/Throat: Oropharynx is clear and moist    Eyes: Pupils are equal, round, and reactive to light  Conjunctivae and EOM are normal    Neck: Normal range of motion  Neck supple  Cardiovascular: Normal rate, regular rhythm and normal heart sounds  Pulmonary/Chest: Effort normal and breath sounds normal    Abdominal: Soft  Bowel sounds are normal  He exhibits no mass  There is no tenderness  There is no rebound and no guarding  Genitourinary:   Genitourinary Comments: deferred   Musculoskeletal: He exhibits edema  Neurological: He is alert and oriented to person, place, and time  He has normal reflexes  Skin: Skin is warm and dry  No rash noted  Psychiatric: He has a normal mood and affect  Nursing note and vitals reviewed  Additional Data:     Lab Results: I have personally reviewed pertinent reports  Results from last 7 days   Lab Units 02/13/19  0604   WBC Thousand/uL 6 60   HEMOGLOBIN g/dL 13 1*   HEMATOCRIT % 40 7*   PLATELETS Thousands/uL 225     Results from last 7 days   Lab Units 02/13/19  0604   SODIUM mmol/L 138   POTASSIUM mmol/L 3 7   CHLORIDE mmol/L 102   CO2 mmol/L 29   BUN mg/dL 12   CREATININE mg/dL 0 55*   ANION GAP mmol/L 7   CALCIUM mg/dL 9 4   GLUCOSE RANDOM mg/dL 110*             Lab Results   Component Value Date/Time    HGBA1C 5 9 09/28/2018    HGBA1C 6 2 05/23/2018    HGBA1C 6 0 10/25/2017    HGBA1C 6 3 04/22/2017 05:51 AM     Results from last 7 days   Lab Units 02/14/19  0603 02/13/19  2052 02/13/19  1634 02/13/19  1154 02/13/19  0607 02/12/19  2049 02/12/19  1652 02/12/19  1209   POC GLUCOSE mg/dl 95 112 108 93 112 133 143* 124           Imaging: I have personally reviewed pertinent reports  No orders to display       EKG, Pathology, and Other Studies Reviewed on Admission:   · EKG:  Reviewed previous EKG    ** Please Note: This note has been constructed using a voice recognition system   **

## 2019-02-14 NOTE — ASSESSMENT & PLAN NOTE
The patient has the history of abnormal gait, likely multifactorial but also likely with a large component from parkinsonism  He was recently diagnosed with primary parkinsonism and started on Sinemet  We proceed with the Sinemet, gradually increasing the dose as directed by Neurology  We also proceed with comprehensive rehabilitation program to address functional deficits, including those in the area of mobility  He currently needs assistance for balance and a walker for upper limb support  He needs to be independent with walking at home  He is medically stable to tolerate 3 hr therapy per day    We proceed with the program

## 2019-02-14 NOTE — PLAN OF CARE
Lactulose administered for bowel movement, Toe abrasion has 3M applied to scab, open to air  Blood sugar are excellent with regimine--accu checks decreased to twice a day

## 2019-02-14 NOTE — ASSESSMENT & PLAN NOTE
Continue medical management    Last echo done on 01/09/2019 shows EF of 50% mild concentric left ventricular hypertrophy with mild hypokinesis of the apex

## 2019-02-14 NOTE — ASSESSMENT & PLAN NOTE
He has the recent history of aspiration pneumonitis  Currently, he has no cough and no fever  He denies any chest pain or shortness of breath  Lungs are clear to auscultation  Proceed with speech language pathology evaluation and close monitoring of the clinical situation

## 2019-02-14 NOTE — ASSESSMENT & PLAN NOTE
Lab Results   Component Value Date    HGBA1C 5 9 09/28/2018    The patient is currently on metformin and insulin  Accu-Chek blood glucose values are acceptable  We will continue to follow the Accu-Chek blood glucose and possibly reduce the dose of insulin  The patient hopes that the insulin can be discontinued entirely      Recent Labs     02/13/19  1154 02/13/19  1634 02/13/19  2052 02/14/19  0603   POCGLU 93 108 112 95       Blood Sugar Average: Last 72 hrs:  (P) 115

## 2019-02-14 NOTE — PROGRESS NOTES
Progress Note - Noemi Perez 1950, 68 y o  male MRN: [id number]    Unit/Bed#: [facility]ERV[facility] 265-01 Encounter: [id number]    Primary Care Provider: No primary care provider on file  Date and time admitted to hospital: 2/12/2019 10:51 AM        Chronic systolic CHF (congestive heart failure) [facility])  Assessment & Plan  He has a history of CHF  We will monitor daily weights  He is currently on diuretic as well as beta-blocker and afterload reduction  He currently appears nearly euvolemic, possibly trace fluid overload  Atrial fibrillation, chronic ([facility] )  Assessment & Plan  He has a history of atrial fibrillation  He was on amiodarone and this was recently discontinued  He continues on the beta-blocker  Will monitor the vital signs and hemodynamic clinical picture  He is on anticoagulation as well  Diabetes mellitus type 2 with neurological manifestations [facility])  Assessment & Plan  Lab Results   Component Value Date    HGBA1C 5 9 09/28/2018    The patient is currently on metformin and insulin  Accu-Chek blood glucose values are acceptable  We will continue to follow the Accu-Chek blood glucose and possibly reduce the dose of insulin  The patient hopes that the insulin can be discontinued entirely  Recent Labs     02/13/19  1154 02/13/19  1634 02/13/19  2052 02/14/19  0603   POCGLU 93 108 112 95       Blood Sugar Average: Last 72 hrs:  (P) 115    Aspiration pneumonitis (HCC)  Assessment & Plan  He has the recent history of aspiration pneumonitis  Currently, he has no cough and no fever  He denies any chest pain or shortness of breath  Lungs are clear to auscultation  Proceed with speech language pathology evaluation and close monitoring of the clinical situation  Abnormal gait  Assessment & Plan  The patient has the history of abnormal gait, likely multifactorial but also likely with a large component from parkinsonism    He was recently diagnosed with primary parkinsonism and started on Sinemet  We proceed with the Sinemet, gradually increasing the dose as directed by Neurology  We also proceed with comprehensive rehabilitation program to address functional deficits, including those in the area of mobility  He currently needs assistance for balance and a walker for upper limb support  He needs to be independent with walking at home  He is medically stable to tolerate 3 hr therapy per day  We proceed with the program     * Parkinsonism [facility])  Assessment & Plan  As above, parkinsonism has been diagnosed  We proceed with rehabilitation therapies to improve function and gradually adjusting the dose of Sinemet and monitoring for its affect  Subjective/Objective     Subjective: The patient reports feeling well  He reports good sleep and adequate appetite  He reports adequate bowel and bladder function  He complains of a sense of objects rushing past him when he walks  This is more present within and reached environment and less of a problem when he is walking or moving in an area where the floor presents a constant appearance and the mills are similar  The floor tiles with patterns and lines and different colors cause increased difficulties in this regard  He denies any chest pain or shortness of breath or lightheadedness or dizziness  He denies any new neurologic deficits  He complains of the difficulty walking  This has been a problem for many months  It is moderate to severe in severity  Both lower limbs in the entire body are involved  On review of systems, he denies other significant ENT, ophthalmological, cardiac, pulmonary, GI, , hematologic, dermatologic, psychiatric, neurologic, or musculoskeletal difficulties other than those consistent with the past medical history and history of present illness  Objective:  Vitals: Blood pressure 125/68, pulse 67, temperature 97 8 °F (36 6 °C), temperature source Temporal, resp   rate 20, height 6' 5" (1 956 m), weight 136 kg (299 lb 6 4 oz), SpO2 94 %  ,Body mass index is 35 5 kg/m²  Intake/Output Summary (Last 24 hours) at 2/14/2019 0900  Last data filed at 2/14/2019 0853  Gross per 24 hour   Intake 360 ml   Output    Net 360 ml       Invasive Devices          None          Physical Exam: Vital signs are noted  Accu-Chek blood glucoses are acceptable  Head is normocephalic and atraumatic  Neck has functional range of motion  Mucous membranes are moist   No erythema is noted  Cardiac rhythm is regular without ectopy  Lungs are clear without rhonchi  Abdomen is soft and nontender  Bowel sounds are present  Limb examination reveals trace edema in the lower limbs  Both calves are nontender  There is planus deformity on the right foot and severe planus on the left with Charcot foot diagnosis and history of surgical repair  Pulses are palpable for posterior tibial on the left and dorsalis pedis on the right  Neurologic examination is remarkable for bradykinesia and diminished facial expression as well as abnormal gait posture and balance  Lab, Imaging and other studies: I have personally reviewed pertinent reports      VTE Pharmacologic Prophylaxis: Reason for no pharmacologic prophylaxis He is on anticoagulation  VTE Mechanical Prophylaxis: reason for no mechanical VTE prophylaxis He is on anticoagulation

## 2019-02-14 NOTE — ASSESSMENT & PLAN NOTE
He has a history of CHF  We will monitor daily weights  He is currently on diuretic as well as beta-blocker and afterload reduction  He currently appears nearly euvolemic, possibly trace fluid overload

## 2019-02-14 NOTE — ASSESSMENT & PLAN NOTE
As above, parkinsonism has been diagnosed  We proceed with rehabilitation therapies to improve function and gradually adjusting the dose of Sinemet and monitoring for its affect

## 2019-02-15 LAB
GLUCOSE SERPL-MCNC: 103 MG/DL (ref 65–140)
GLUCOSE SERPL-MCNC: 99 MG/DL (ref 65–140)

## 2019-02-15 PROCEDURE — 82948 REAGENT STRIP/BLOOD GLUCOSE: CPT

## 2019-02-15 RX ORDER — INSULIN ASPART 100 [IU]/ML
4 INJECTION, SUSPENSION SUBCUTANEOUS
Status: DISCONTINUED | OUTPATIENT
Start: 2019-02-16 | End: 2019-02-19

## 2019-02-15 RX ORDER — POLYVINYL ALCOHOL 14 MG/ML
1 SOLUTION/ DROPS OPHTHALMIC
Status: DISCONTINUED | OUTPATIENT
Start: 2019-02-15 | End: 2019-02-23 | Stop reason: HOSPADM

## 2019-02-15 RX ADMIN — LISINOPRIL 10 MG: 10 TABLET ORAL at 08:34

## 2019-02-15 RX ADMIN — FUROSEMIDE 20 MG: 20 TABLET ORAL at 08:34

## 2019-02-15 RX ADMIN — ASPIRIN 81 MG 81 MG: 81 TABLET ORAL at 08:34

## 2019-02-15 RX ADMIN — METFORMIN HYDROCHLORIDE 1000 MG: 500 TABLET ORAL at 17:34

## 2019-02-15 RX ADMIN — ALLOPURINOL 300 MG: 300 TABLET ORAL at 08:34

## 2019-02-15 RX ADMIN — Medication 1 TABLET: at 08:34

## 2019-02-15 RX ADMIN — TAMSULOSIN HYDROCHLORIDE 0.4 MG: 0.4 CAPSULE ORAL at 17:35

## 2019-02-15 RX ADMIN — NYSTATIN 1 APPLICATION: 100000 POWDER TOPICAL at 17:42

## 2019-02-15 RX ADMIN — CARBIDOPA AND LEVODOPA 0.5 TABLET: 25; 100 TABLET ORAL at 06:11

## 2019-02-15 RX ADMIN — CYANOCOBALAMIN TAB 1000 MCG 1000 MCG: 1000 TAB at 08:34

## 2019-02-15 RX ADMIN — METOPROLOL SUCCINATE 100 MG: 50 TABLET, EXTENDED RELEASE ORAL at 08:34

## 2019-02-15 RX ADMIN — BIMATOPROST 1 DROP: 0.1 SOLUTION/ DROPS OPHTHALMIC at 21:21

## 2019-02-15 RX ADMIN — ACETAMINOPHEN 325 MG: 325 TABLET ORAL at 22:50

## 2019-02-15 RX ADMIN — POLYVINYL ALCOHOL 1 DROP: 14 SOLUTION/ DROPS OPHTHALMIC at 20:12

## 2019-02-15 RX ADMIN — CARBIDOPA AND LEVODOPA 0.5 TABLET: 25; 100 TABLET ORAL at 16:19

## 2019-02-15 RX ADMIN — NYSTATIN: 100000 POWDER TOPICAL at 08:36

## 2019-02-15 RX ADMIN — BRIMONIDINE TARTRATE 1 DROP: 1.5 SOLUTION OPHTHALMIC at 11:24

## 2019-02-15 RX ADMIN — ATORVASTATIN CALCIUM 40 MG: 40 TABLET, FILM COATED ORAL at 17:34

## 2019-02-15 RX ADMIN — MAGNESIUM OXIDE TAB 400 MG (241.3 MG ELEMENTAL MG) 400 MG: 400 (241.3 MG) TAB at 08:34

## 2019-02-15 RX ADMIN — VITAMIN D, TAB 1000IU (100/BT) 1000 UNITS: 25 TAB at 08:34

## 2019-02-15 RX ADMIN — MINERAL OIL AND WHITE PETROLATUM 1 APPLICATION: 150; 830 OINTMENT OPHTHALMIC at 17:07

## 2019-02-15 RX ADMIN — CARBIDOPA AND LEVODOPA 0.5 TABLET: 25; 100 TABLET ORAL at 12:15

## 2019-02-15 RX ADMIN — POTASSIUM CHLORIDE 20 MEQ: 20 TABLET, EXTENDED RELEASE ORAL at 08:34

## 2019-02-15 RX ADMIN — METFORMIN HYDROCHLORIDE 1000 MG: 500 TABLET ORAL at 08:33

## 2019-02-15 RX ADMIN — RIVAROXABAN 20 MG: 20 TABLET, FILM COATED ORAL at 17:34

## 2019-02-15 RX ADMIN — INSULIN ASPART 8 UNITS: 100 INJECTION, SUSPENSION SUBCUTANEOUS at 08:30

## 2019-02-15 NOTE — NURSING NOTE
Chart rounds done today, pt is for potential discharge to home next Friday or Saturday  Neurology recommendations were reviewed and per Dr Aggarwal Police the sinemet dose will be increased by Monday  Speech is following the pt  He denies pain, is alert and oriented, sligt fine tremor noted in thumbs when pt was holding his phone  He is continent of bowel and bladder  HR is regular, pulses palable, pt has a good appetite  Will continue to monitor

## 2019-02-16 LAB
GLUCOSE SERPL-MCNC: 96 MG/DL (ref 65–140)
GLUCOSE SERPL-MCNC: 98 MG/DL (ref 65–140)

## 2019-02-16 PROCEDURE — 82948 REAGENT STRIP/BLOOD GLUCOSE: CPT

## 2019-02-16 RX ADMIN — ASPIRIN 81 MG 81 MG: 81 TABLET ORAL at 08:22

## 2019-02-16 RX ADMIN — NYSTATIN: 100000 POWDER TOPICAL at 08:27

## 2019-02-16 RX ADMIN — ALLOPURINOL 300 MG: 300 TABLET ORAL at 08:23

## 2019-02-16 RX ADMIN — POLYVINYL ALCOHOL 1 DROP: 14 SOLUTION/ DROPS OPHTHALMIC at 19:42

## 2019-02-16 RX ADMIN — CARBIDOPA AND LEVODOPA 0.5 TABLET: 25; 100 TABLET ORAL at 11:10

## 2019-02-16 RX ADMIN — FUROSEMIDE 20 MG: 20 TABLET ORAL at 08:23

## 2019-02-16 RX ADMIN — POLYETHYLENE GLYCOL 3350 17 G: 17 POWDER, FOR SOLUTION ORAL at 08:28

## 2019-02-16 RX ADMIN — METFORMIN HYDROCHLORIDE 1000 MG: 500 TABLET ORAL at 16:53

## 2019-02-16 RX ADMIN — BIMATOPROST 1 DROP: 0.1 SOLUTION/ DROPS OPHTHALMIC at 21:44

## 2019-02-16 RX ADMIN — CARBIDOPA AND LEVODOPA 0.5 TABLET: 25; 100 TABLET ORAL at 16:54

## 2019-02-16 RX ADMIN — NYSTATIN: 100000 POWDER TOPICAL at 21:49

## 2019-02-16 RX ADMIN — VITAMIN D, TAB 1000IU (100/BT) 1000 UNITS: 25 TAB at 08:22

## 2019-02-16 RX ADMIN — POTASSIUM CHLORIDE 20 MEQ: 20 TABLET, EXTENDED RELEASE ORAL at 08:23

## 2019-02-16 RX ADMIN — CYANOCOBALAMIN TAB 1000 MCG 1000 MCG: 1000 TAB at 08:22

## 2019-02-16 RX ADMIN — ATORVASTATIN CALCIUM 40 MG: 40 TABLET, FILM COATED ORAL at 17:04

## 2019-02-16 RX ADMIN — INSULIN ASPART 4 UNITS: 100 INJECTION, SUSPENSION SUBCUTANEOUS at 08:23

## 2019-02-16 RX ADMIN — Medication 1 TABLET: at 08:23

## 2019-02-16 RX ADMIN — CARBIDOPA AND LEVODOPA 0.5 TABLET: 25; 100 TABLET ORAL at 06:03

## 2019-02-16 RX ADMIN — METFORMIN HYDROCHLORIDE 1000 MG: 500 TABLET ORAL at 08:22

## 2019-02-16 RX ADMIN — RIVAROXABAN 20 MG: 20 TABLET, FILM COATED ORAL at 16:53

## 2019-02-16 RX ADMIN — BRIMONIDINE TARTRATE 1 DROP: 1.5 SOLUTION OPHTHALMIC at 08:33

## 2019-02-16 RX ADMIN — MAGNESIUM OXIDE TAB 400 MG (241.3 MG ELEMENTAL MG) 400 MG: 400 (241.3 MG) TAB at 08:22

## 2019-02-16 RX ADMIN — TAMSULOSIN HYDROCHLORIDE 0.4 MG: 0.4 CAPSULE ORAL at 16:53

## 2019-02-16 RX ADMIN — METOPROLOL SUCCINATE 100 MG: 50 TABLET, EXTENDED RELEASE ORAL at 08:24

## 2019-02-16 NOTE — PROGRESS NOTES
Progress Note - Rachael Dejesus 68 y o  male MRN: [id number]    Unit/Bed#: [facility]ERVA 265-01 Encounter: [id number]      Assessment:  Case discussed with nursing and rehabilitation team      Parkinsonism [facility])   Abnormal gait   Aspiration pneumonitis (HCC)   HTN (hypertension)   Diabetes mellitus type 2 with neurological manifestations ([facility] )   Atrial fibrillation, chronic (HCC)   Hypertrophic cardiomyopathy ([facility] )   Chronic diastolic CHF (congestive heart failure) ([place] [address] )       Plan:  Continue comprehensive inpatient multidisciplinary rehabilitation program  allopurinol (ZYLOPRIM) tablet 300 mg   aspirin chewable tablet 81 mg   atorvastatin (LIPITOR) tablet 40 mg   bimatoprost (LUMIGAN) 0 01 % ophthalmic solution 1 drop   brimonidine (ALPHAGAN P) 0 15 % ophthalmic solution 1 drop   carbidopa-levodopa (SINEMET)  mg per tablet 0 5 tablet   carbidopa-levodopa (SINEMET)  mg per tablet 1 tablet   cholecalciferol (VITAMIN D3) tablet 1,000 Units   cyanocobalamin (VITAMIN B-12) tablet 1,000 mcg   furosemide (LASIX) tablet 20 mg   insulin aspart protamine-insulin aspart (NovoLOG 70/30) 100 units/mL subcutaneous injection 4 Units   lisinopril (ZESTRIL) tablet 10 mg   magnesium oxide (MAG-OX) tablet 400 mg   metFORMIN (GLUCOPHAGE) tablet 1,000 mg   metoprolol succinate (TOPROL-XL) 24 hr tablet 100 mg   multivitamin-minerals (CENTRUM) tablet 1 tablet   nystatin (MYCOSTATIN) powder   polyethylene glycol (MIRALAX) packet 17 g   potassium chloride (K-DUR,KLOR-CON) CR tablet 20 mEq   rivaroxaban (XARELTO) tablet 20 mg   tamsulosin (FLOMAX) capsule 0 4 mg        PRN     Medication   acetaminophen (TYLENOL) tablet 325 mg   meclizine (ANTIVERT) tablet 12 5 mg   polyvinyl alcohol (LIQUIFILM TEARS) 1 4 % ophthalmic solution 1 drop        Subjective:   Patient seen and examined while participating in occupational therapy for morning hygiene-demonstrating modified independence a wheelchair level for upper extremity hygiene  Feels somewhat sleepy  Reports poor sleep overnight    Objective:     Vitals: Blood pressure 109/57, pulse 69, temperature 98 5 °F (36 9 °C), temperature source Temporal, resp  rate 18, height 6' 5" (1 956 m), weight 132 kg (290 lb 3 2 oz), SpO2 95 %  ,Body mass index is 34 41 kg/m²  No intake or output data in the 24 hours ending 02/16/19 1016    Physical Exam:    Physical Exam:  adult white male seated in wheelchair    Head is normocephalic and atraumatic  Neck has functional range of motion  Mucous membranes are moist   No erythema is noted  Cardiac rhythm is regular without ectopy  Lungs are clear without rhonchi  Normal Respiratory rate and effort    Abdomen is soft and nontender  Bowel sounds are present  Limb examination reveals trace edema in the lower limbs  Both calves are nontender  There is planus deformity on the right foot and severe planus on the left with Charcot foot diagnosis and history of surgical repair  Pulses are palpable for posterior tibial on the left and dorsalis pedis on the right  Neurologic examination is remarkable for bradykinesia and diminished facial expression as well as abnormal gait posture and balance    Normal level of arousal was reasonably preserved responses

## 2019-02-17 LAB
GLUCOSE SERPL-MCNC: 100 MG/DL (ref 65–140)
GLUCOSE SERPL-MCNC: 108 MG/DL (ref 65–140)

## 2019-02-17 PROCEDURE — 82948 REAGENT STRIP/BLOOD GLUCOSE: CPT

## 2019-02-17 RX ORDER — AMOXICILLIN 250 MG
1 CAPSULE ORAL 2 TIMES DAILY PRN
Status: DISCONTINUED | OUTPATIENT
Start: 2019-02-17 | End: 2019-02-23 | Stop reason: HOSPADM

## 2019-02-17 RX ADMIN — CYANOCOBALAMIN TAB 1000 MCG 1000 MCG: 1000 TAB at 08:24

## 2019-02-17 RX ADMIN — METFORMIN HYDROCHLORIDE 1000 MG: 500 TABLET ORAL at 17:04

## 2019-02-17 RX ADMIN — POLYVINYL ALCOHOL 1 DROP: 14 SOLUTION/ DROPS OPHTHALMIC at 07:45

## 2019-02-17 RX ADMIN — MAGNESIUM OXIDE TAB 400 MG (241.3 MG ELEMENTAL MG) 400 MG: 400 (241.3 MG) TAB at 08:23

## 2019-02-17 RX ADMIN — CARBIDOPA AND LEVODOPA 1 TABLET: 25; 100 TABLET ORAL at 11:58

## 2019-02-17 RX ADMIN — VITAMIN D, TAB 1000IU (100/BT) 1000 UNITS: 25 TAB at 08:24

## 2019-02-17 RX ADMIN — BRIMONIDINE TARTRATE 1 DROP: 1.5 SOLUTION OPHTHALMIC at 08:26

## 2019-02-17 RX ADMIN — POLYETHYLENE GLYCOL 3350 17 G: 17 POWDER, FOR SOLUTION ORAL at 08:24

## 2019-02-17 RX ADMIN — CARBIDOPA AND LEVODOPA 1 TABLET: 25; 100 TABLET ORAL at 08:23

## 2019-02-17 RX ADMIN — ALLOPURINOL 300 MG: 300 TABLET ORAL at 08:23

## 2019-02-17 RX ADMIN — TAMSULOSIN HYDROCHLORIDE 0.4 MG: 0.4 CAPSULE ORAL at 17:04

## 2019-02-17 RX ADMIN — RIVAROXABAN 20 MG: 20 TABLET, FILM COATED ORAL at 17:04

## 2019-02-17 RX ADMIN — FUROSEMIDE 20 MG: 20 TABLET ORAL at 08:23

## 2019-02-17 RX ADMIN — Medication 1 TABLET: at 08:24

## 2019-02-17 RX ADMIN — NYSTATIN: 100000 POWDER TOPICAL at 21:14

## 2019-02-17 RX ADMIN — CARBIDOPA AND LEVODOPA 1 TABLET: 25; 100 TABLET ORAL at 17:04

## 2019-02-17 RX ADMIN — METOPROLOL SUCCINATE 100 MG: 50 TABLET, EXTENDED RELEASE ORAL at 08:24

## 2019-02-17 RX ADMIN — METFORMIN HYDROCHLORIDE 1000 MG: 500 TABLET ORAL at 08:24

## 2019-02-17 RX ADMIN — INSULIN ASPART 4 UNITS: 100 INJECTION, SUSPENSION SUBCUTANEOUS at 08:22

## 2019-02-17 RX ADMIN — NYSTATIN: 100000 POWDER TOPICAL at 08:26

## 2019-02-17 RX ADMIN — ASPIRIN 81 MG 81 MG: 81 TABLET ORAL at 08:24

## 2019-02-17 RX ADMIN — POTASSIUM CHLORIDE 20 MEQ: 20 TABLET, EXTENDED RELEASE ORAL at 08:23

## 2019-02-17 RX ADMIN — BIMATOPROST 1 DROP: 0.1 SOLUTION/ DROPS OPHTHALMIC at 21:14

## 2019-02-17 RX ADMIN — ATORVASTATIN CALCIUM 40 MG: 40 TABLET, FILM COATED ORAL at 17:04

## 2019-02-18 LAB
GLUCOSE SERPL-MCNC: 135 MG/DL (ref 65–140)
GLUCOSE SERPL-MCNC: 86 MG/DL (ref 65–140)

## 2019-02-18 PROCEDURE — 82948 REAGENT STRIP/BLOOD GLUCOSE: CPT

## 2019-02-18 RX ADMIN — ALLOPURINOL 300 MG: 300 TABLET ORAL at 08:01

## 2019-02-18 RX ADMIN — SENNOSIDES AND DOCUSATE SODIUM 1 TABLET: 8.6; 5 TABLET ORAL at 08:02

## 2019-02-18 RX ADMIN — ASPIRIN 81 MG 81 MG: 81 TABLET ORAL at 08:01

## 2019-02-18 RX ADMIN — NYSTATIN: 100000 POWDER TOPICAL at 08:02

## 2019-02-18 RX ADMIN — CYANOCOBALAMIN TAB 1000 MCG 1000 MCG: 1000 TAB at 08:01

## 2019-02-18 RX ADMIN — FUROSEMIDE 20 MG: 20 TABLET ORAL at 08:01

## 2019-02-18 RX ADMIN — Medication 1 TABLET: at 08:02

## 2019-02-18 RX ADMIN — POLYETHYLENE GLYCOL 3350 17 G: 17 POWDER, FOR SOLUTION ORAL at 08:02

## 2019-02-18 RX ADMIN — POTASSIUM CHLORIDE 20 MEQ: 20 TABLET, EXTENDED RELEASE ORAL at 08:01

## 2019-02-18 RX ADMIN — CARBIDOPA AND LEVODOPA 1 TABLET: 25; 100 TABLET ORAL at 06:11

## 2019-02-18 RX ADMIN — METFORMIN HYDROCHLORIDE 1000 MG: 500 TABLET ORAL at 07:58

## 2019-02-18 RX ADMIN — VITAMIN D, TAB 1000IU (100/BT) 1000 UNITS: 25 TAB at 08:02

## 2019-02-18 RX ADMIN — BIMATOPROST 1 DROP: 0.1 SOLUTION/ DROPS OPHTHALMIC at 21:29

## 2019-02-18 RX ADMIN — MAGNESIUM OXIDE TAB 400 MG (241.3 MG ELEMENTAL MG) 400 MG: 400 (241.3 MG) TAB at 08:01

## 2019-02-18 RX ADMIN — BRIMONIDINE TARTRATE 1 DROP: 1.5 SOLUTION OPHTHALMIC at 08:07

## 2019-02-18 RX ADMIN — ATORVASTATIN CALCIUM 40 MG: 40 TABLET, FILM COATED ORAL at 18:00

## 2019-02-18 RX ADMIN — NYSTATIN 1 APPLICATION: 100000 POWDER TOPICAL at 21:28

## 2019-02-18 RX ADMIN — METFORMIN HYDROCHLORIDE 1000 MG: 500 TABLET ORAL at 18:00

## 2019-02-18 RX ADMIN — RIVAROXABAN 20 MG: 20 TABLET, FILM COATED ORAL at 18:00

## 2019-02-18 RX ADMIN — TAMSULOSIN HYDROCHLORIDE 0.4 MG: 0.4 CAPSULE ORAL at 18:00

## 2019-02-18 RX ADMIN — CARBIDOPA AND LEVODOPA 1 TABLET: 25; 100 TABLET ORAL at 16:59

## 2019-02-18 RX ADMIN — CARBIDOPA AND LEVODOPA 1 TABLET: 25; 100 TABLET ORAL at 13:50

## 2019-02-18 RX ADMIN — INSULIN ASPART 4 UNITS: 100 INJECTION, SUSPENSION SUBCUTANEOUS at 07:57

## 2019-02-18 RX ADMIN — METOPROLOL SUCCINATE 100 MG: 50 TABLET, EXTENDED RELEASE ORAL at 08:01

## 2019-02-18 RX ADMIN — BISACODYL 5 MG: 5 TABLET, COATED ORAL at 21:30

## 2019-02-18 NOTE — NURSING NOTE
Patient had no bm for a few days did get order for senna s or dulcolax tablets offered the patient either of these along with his miralax that he has been taking he refused would only take a large cup of prune juice which we gave to him

## 2019-02-19 LAB
GLUCOSE SERPL-MCNC: 93 MG/DL (ref 65–140)
GLUCOSE SERPL-MCNC: 94 MG/DL (ref 65–140)

## 2019-02-19 PROCEDURE — 82948 REAGENT STRIP/BLOOD GLUCOSE: CPT

## 2019-02-19 RX ORDER — LACTULOSE 20 G/30ML
10 SOLUTION ORAL 2 TIMES DAILY
Status: DISCONTINUED | OUTPATIENT
Start: 2019-02-19 | End: 2019-02-19

## 2019-02-19 RX ADMIN — METFORMIN HYDROCHLORIDE 1000 MG: 500 TABLET ORAL at 08:22

## 2019-02-19 RX ADMIN — POLYETHYLENE GLYCOL 3350 17 G: 17 POWDER, FOR SOLUTION ORAL at 08:24

## 2019-02-19 RX ADMIN — ASPIRIN 81 MG 81 MG: 81 TABLET ORAL at 08:24

## 2019-02-19 RX ADMIN — METFORMIN HYDROCHLORIDE 1000 MG: 500 TABLET ORAL at 17:09

## 2019-02-19 RX ADMIN — TAMSULOSIN HYDROCHLORIDE 0.4 MG: 0.4 CAPSULE ORAL at 17:09

## 2019-02-19 RX ADMIN — LACTULOSE 10 G: 10 SOLUTION ORAL at 12:22

## 2019-02-19 RX ADMIN — BRIMONIDINE TARTRATE 1 DROP: 1.5 SOLUTION OPHTHALMIC at 08:28

## 2019-02-19 RX ADMIN — BIMATOPROST 1 DROP: 0.1 SOLUTION/ DROPS OPHTHALMIC at 21:09

## 2019-02-19 RX ADMIN — METOPROLOL SUCCINATE 100 MG: 50 TABLET, EXTENDED RELEASE ORAL at 08:24

## 2019-02-19 RX ADMIN — FUROSEMIDE 20 MG: 20 TABLET ORAL at 08:25

## 2019-02-19 RX ADMIN — ATORVASTATIN CALCIUM 40 MG: 40 TABLET, FILM COATED ORAL at 17:11

## 2019-02-19 RX ADMIN — ALLOPURINOL 300 MG: 300 TABLET ORAL at 08:25

## 2019-02-19 RX ADMIN — NYSTATIN: 100000 POWDER TOPICAL at 08:26

## 2019-02-19 RX ADMIN — CARBIDOPA AND LEVODOPA 1 TABLET: 25; 100 TABLET ORAL at 12:22

## 2019-02-19 RX ADMIN — Medication 1 TABLET: at 08:25

## 2019-02-19 RX ADMIN — VITAMIN D, TAB 1000IU (100/BT) 1000 UNITS: 25 TAB at 08:25

## 2019-02-19 RX ADMIN — CYANOCOBALAMIN TAB 1000 MCG 1000 MCG: 1000 TAB at 08:25

## 2019-02-19 RX ADMIN — POTASSIUM CHLORIDE 20 MEQ: 20 TABLET, EXTENDED RELEASE ORAL at 08:25

## 2019-02-19 RX ADMIN — CARBIDOPA AND LEVODOPA 1 TABLET: 25; 100 TABLET ORAL at 06:04

## 2019-02-19 RX ADMIN — NYSTATIN 1 APPLICATION: 100000 POWDER TOPICAL at 21:09

## 2019-02-19 RX ADMIN — RIVAROXABAN 20 MG: 20 TABLET, FILM COATED ORAL at 17:09

## 2019-02-19 RX ADMIN — SENNOSIDES AND DOCUSATE SODIUM 1 TABLET: 8.6; 5 TABLET ORAL at 08:27

## 2019-02-19 RX ADMIN — CARBIDOPA AND LEVODOPA 1 TABLET: 25; 100 TABLET ORAL at 16:19

## 2019-02-19 RX ADMIN — INSULIN ASPART 4 UNITS: 100 INJECTION, SUSPENSION SUBCUTANEOUS at 08:21

## 2019-02-19 RX ADMIN — MAGNESIUM OXIDE TAB 400 MG (241.3 MG ELEMENTAL MG) 400 MG: 400 (241.3 MG) TAB at 08:24

## 2019-02-19 NOTE — ASSESSMENT & PLAN NOTE
The patient has the history of abnormal gait, likely multifactorial but also likely with a large component from parkinsonism  He was recently diagnosed with primary parkinsonism and started on Sinemet  We proceed with the Sinemet, now at the increased dose of 1 pill (25/100) 3 times per day, with plans to increase again next week  He is tolerating the medication without significant side effects including no hallucinations no postural hypotension  Regarding mobility, he currently needs cues and supervision and rare minimal assistance for ambulation  He does not use an upper limb assistive device by his choice  He needs to be independent with walking at home  He is medically stable to tolerate 3 hr therapy per day    We proceed with the program

## 2019-02-19 NOTE — ASSESSMENT & PLAN NOTE
He has the recent history of aspiration pneumonitis  Currently, he has no cough and no fever  He denies any chest pain or shortness of breath  Lungs are clear to auscultation  Proceed with speech language pathology treatment and close monitoring of the clinical situation

## 2019-02-19 NOTE — ASSESSMENT & PLAN NOTE
As above, parkinsonism has been diagnosed  We proceed with rehabilitation therapies to improve function and gradually adjusting the dose of Sinemet and monitoring for its affect  He is currently on 25/100 strength pills, 1 3 times per day  He has tolerated the medication  He notices improvement in his speech  We continue the medications for several days and then increase the dose as directed by Neurology  Gait quality is subjectively improved  There is hypophonia and impaired speech, but this is improving

## 2019-02-19 NOTE — ASSESSMENT & PLAN NOTE
Lab Results   Component Value Date    HGBA1C 5 9 09/28/2018    The patient is currently on metformin and insulin  Accu-Chek blood glucose values are acceptable  We will continue to follow the Accu-Chek blood glucose and possibly reduce the dose of insulin  The patient hopes that the insulin can be discontinued entirely  Insulin dose was recently decreased  Accu-Chek blood glucoses remain in the acceptable range  I discontinued the insulin today and we will follow the results      Recent Labs     02/17/19  1632 02/18/19  0622 02/18/19  1633 02/19/19  0541   POCGLU 108 86 135 94       Blood Sugar Average: Last 72 hrs:  (P) 483 [id number]

## 2019-02-19 NOTE — ASSESSMENT & PLAN NOTE
He has a history of CHF  We will monitor daily weights  He is currently on diuretic as well as beta-blocker and afterload reduction  He currently appears nearly euvolemic, possibly trace fluid overload  He has lost about 5 lb since admission  We continue to monitor

## 2019-02-19 NOTE — PROGRESS NOTES
Progress Note - Ellen Sanchez 1950, 68 y o  male MRN: [id number]    Unit/Bed#: [facility] 265-01 Encounter: [id number]    Primary Care Provider: No primary care provider on file  Date and time admitted to hospital: 2/12/2019 10:51 AM        Chronic diastolic CHF (congestive heart failure) [facility])  Assessment & Plan  He has a history of CHF  We will monitor daily weights  He is currently on diuretic as well as beta-blocker and afterload reduction  He currently appears nearly euvolemic, possibly trace fluid overload  He has lost about 5 lb since admission  We continue to monitor  Hypertrophic cardiomyopathy ([facility] )  Assessment & Plan  History of hypertrophic cardiomyopathy is noted  He is stable hemodynamically  Atrial fibrillation, chronic ([facility] )  Assessment & Plan  He has a history of atrial fibrillation  He was on amiodarone and this was recently discontinued  He continues on the beta-blocker  Will monitor the vital signs and hemodynamic clinical picture  He is on anticoagulation as well  He is hemodynamically stable on the current medications including beta-blocker  He is no longer on amiodarone  Diabetes mellitus type 2 with neurological manifestations [facility])  Assessment & Plan  Lab Results   Component Value Date    HGBA1C 5 9 09/28/2018    The patient is currently on metformin and insulin  Accu-Chek blood glucose values are acceptable  We will continue to follow the Accu-Chek blood glucose and possibly reduce the dose of insulin  The patient hopes that the insulin can be discontinued entirely  Insulin dose was recently decreased  Accu-Chek blood glucoses remain in the acceptable range  I discontinued the insulin today and we will follow the results      Recent Labs     02/17/19  1632 02/18/19  0622 02/18/19  1633 02/19/19  0541   POCGLU 108 86 135 94       Blood Sugar Average: Last 72 hrs:  (P) 102 [id number]    HTN (hypertension)  Assessment & Plan  Blood pressure is well controlled on the current regimen  Aspiration pneumonitis [facility])  Assessment & Plan  He has the recent history of aspiration pneumonitis  Currently, he has no cough and no fever  He denies any chest pain or shortness of breath  Lungs are clear to auscultation  Proceed with speech language pathology treatment and close monitoring of the clinical situation  Abnormal gait  Assessment & Plan  The patient has the history of abnormal gait, likely multifactorial but also likely with a large component from parkinsonism  He was recently diagnosed with primary parkinsonism and started on Sinemet  We proceed with the Sinemet, now at the increased dose of 1 pill (25/100) 3 times per day, with plans to increase again next week  He is tolerating the medication without significant side effects including no hallucinations no postural hypotension  Regarding mobility, he currently needs cues and supervision and rare minimal assistance for ambulation  He does not use an upper limb assistive device by his choice  He needs to be independent with walking at home  He is medically stable to tolerate 3 hr therapy per day  We proceed with the program     * Parkinsonism [facility])  Assessment & Plan  As above, parkinsonism has been diagnosed  We proceed with rehabilitation therapies to improve function and gradually adjusting the dose of Sinemet and monitoring for its affect  He is currently on 25/100 strength pills, 1 3 times per day  He has tolerated the medication  He notices improvement in his speech  We continue the medications for several days and then increase the dose as directed by Neurology  Gait quality is subjectively improved  There is hypophonia and impaired speech, but this is improving  Subjective/Objective     Subjective:   He reports feeling fairly well but is still distressed by his visual difficulties  He reports adequate sleep occasionally waking up feeling short of breath    At these times, the pulse oximetry is in the normal range  He feels that he would like to shift in the bed but the bed is small  I will review with nursing options for him to change position to feel more comfortable with breathing at night  He also complains of constipation  I will increase is bowel medications by including lactulose 2 times per day  We will monitor for results  He denies any other chest pain or shortness of breath or limb pain  He reports good appetite and adequate bladder function  He denies other significant ENT, ophthalmological, cardiac, pulmonary, GI, , hematologic, dermatologic, psychiatric, neurologic, and musculoskeletal difficulties other than those consistent with the past medical history and history of present illness  Objective:  Vitals: Blood pressure 125/83, pulse 73, temperature (!) 97 2 °F (36 2 °C), temperature source Temporal, resp  rate 18, height 6' 5" (1 956 m), weight 133 kg (293 lb 10 4 oz), SpO2 94 %  ,Body mass index is 34 82 kg/m²  Intake/Output Summary (Last 24 hours) at 2/19/2019 1056  Last data filed at 2/19/2019 0900  Gross per 24 hour   Intake 600 ml   Output    Net 600 ml       Invasive Devices          None          Physical Exam: Vital signs are noted  Labs are ordered for tomorrow  Blood pressure and pulse are acceptable  In general, he is a well-developed well-nourished individual sitting in the wheelchair in no apparent distress  He converses with the examiner appropriately  The head is normocephalic and atraumatic  The neck has functional range of motion  Mucous membranes are moist   Cardiac rhythm is regular without ectopy  Lungs are clear without wheeze  Abdomen is soft and nontender  Bowel sounds are present  Limb examination reveals trace peripheral edema  Both calves are nontender  There is no increased erythema or heat  There is planus deformity in both feet , left worse than right  Left has history of Charcot diagnosis and surgery  Neurologic examination reveals low volume speech and bradykinesia and abnormal flexed posture  There is no focal weakness or sensory deficit  Skin examination reveals no local skin breakdown  Lab, Imaging and other studies: I have personally reviewed pertinent reports  VTE Pharmacologic Prophylaxis: Reason for no pharmacologic prophylaxis He is on oral anticoagulant  VTE Mechanical Prophylaxis: reason for no mechanical VTE prophylaxis He is on oral anticoagulant    The patient has complaints of vision abnormalities with occasional diplopia  He describes visual images which are clear on distant vision but blurred or diplopia on near vision  Nevertheless, he is able to read if he focuses his attention on the written words  Therapies have identified some inconsistency in extraocular movements and tracking  The these are not noted on the Neurology examination from the acute care hospital   The patient also had recent evaluation with his eye doctor  We will proceed with the current medical regimen with behavioral and exercise interventions    He will be referred to the Outpatient vision therapies at [facility]

## 2019-02-19 NOTE — ASSESSMENT & PLAN NOTE
He has a history of atrial fibrillation  He was on amiodarone and this was recently discontinued  He continues on the beta-blocker  Will monitor the vital signs and hemodynamic clinical picture  He is on anticoagulation as well  He is hemodynamically stable on the current medications including beta-blocker  He is no longer on amiodarone

## 2019-02-20 LAB
ANION GAP SERPL CALCULATED.3IONS-SCNC: 8 MMOL/L (ref 5–14)
BUN SERPL-MCNC: 11 MG/DL (ref 5–25)
CALCIUM SERPL-MCNC: 9.6 MG/DL (ref 8.4–10.2)
CHLORIDE SERPL-SCNC: 99 MMOL/L (ref 97–108)
CO2 SERPL-SCNC: 29 MMOL/L (ref 22–30)
CREAT SERPL-MCNC: 0.52 MG/DL (ref 0.7–1.5)
GFR SERPL CREATININE-BSD FRML MDRD: 107 ML/MIN/1.73SQ M
GLUCOSE P FAST SERPL-MCNC: 96 MG/DL (ref 70–99)
GLUCOSE SERPL-MCNC: 93 MG/DL (ref 65–140)
GLUCOSE SERPL-MCNC: 95 MG/DL (ref 65–140)
GLUCOSE SERPL-MCNC: 96 MG/DL (ref 70–99)
POTASSIUM SERPL-SCNC: 3.7 MMOL/L (ref 3.6–5)
SODIUM SERPL-SCNC: 136 MMOL/L (ref 137–147)

## 2019-02-20 PROCEDURE — 82948 REAGENT STRIP/BLOOD GLUCOSE: CPT

## 2019-02-20 PROCEDURE — 80048 BASIC METABOLIC PNL TOTAL CA: CPT | Performed by: PHYSICAL MEDICINE & REHABILITATION

## 2019-02-20 RX ADMIN — CARBIDOPA AND LEVODOPA 1 TABLET: 25; 100 TABLET ORAL at 06:01

## 2019-02-20 RX ADMIN — FUROSEMIDE 20 MG: 20 TABLET ORAL at 08:07

## 2019-02-20 RX ADMIN — BRIMONIDINE TARTRATE 1 DROP: 1.5 SOLUTION OPHTHALMIC at 08:10

## 2019-02-20 RX ADMIN — CARBIDOPA AND LEVODOPA 1 TABLET: 25; 100 TABLET ORAL at 12:15

## 2019-02-20 RX ADMIN — METFORMIN HYDROCHLORIDE 1000 MG: 500 TABLET ORAL at 17:32

## 2019-02-20 RX ADMIN — TAMSULOSIN HYDROCHLORIDE 0.4 MG: 0.4 CAPSULE ORAL at 17:32

## 2019-02-20 RX ADMIN — METFORMIN HYDROCHLORIDE 1000 MG: 500 TABLET ORAL at 08:07

## 2019-02-20 RX ADMIN — RIVAROXABAN 20 MG: 20 TABLET, FILM COATED ORAL at 17:32

## 2019-02-20 RX ADMIN — POTASSIUM CHLORIDE 20 MEQ: 20 TABLET, EXTENDED RELEASE ORAL at 08:07

## 2019-02-20 RX ADMIN — ALLOPURINOL 300 MG: 300 TABLET ORAL at 08:07

## 2019-02-20 RX ADMIN — BIMATOPROST 1 DROP: 0.1 SOLUTION/ DROPS OPHTHALMIC at 21:29

## 2019-02-20 RX ADMIN — ASPIRIN 81 MG 81 MG: 81 TABLET ORAL at 08:08

## 2019-02-20 RX ADMIN — CARBIDOPA AND LEVODOPA 1 TABLET: 25; 100 TABLET ORAL at 16:37

## 2019-02-20 RX ADMIN — METOPROLOL SUCCINATE 100 MG: 50 TABLET, EXTENDED RELEASE ORAL at 08:07

## 2019-02-20 RX ADMIN — VITAMIN D, TAB 1000IU (100/BT) 1000 UNITS: 25 TAB at 08:07

## 2019-02-20 RX ADMIN — MAGNESIUM OXIDE TAB 400 MG (241.3 MG ELEMENTAL MG) 400 MG: 400 (241.3 MG) TAB at 08:07

## 2019-02-20 RX ADMIN — CYANOCOBALAMIN TAB 1000 MCG 1000 MCG: 1000 TAB at 08:08

## 2019-02-20 RX ADMIN — ATORVASTATIN CALCIUM 40 MG: 40 TABLET, FILM COATED ORAL at 17:33

## 2019-02-20 RX ADMIN — NYSTATIN: 100000 POWDER TOPICAL at 08:10

## 2019-02-20 RX ADMIN — Medication 1 TABLET: at 08:07

## 2019-02-20 RX ADMIN — NYSTATIN 1 APPLICATION: 100000 POWDER TOPICAL at 21:29

## 2019-02-20 RX ADMIN — POLYETHYLENE GLYCOL 3350 17 G: 17 POWDER, FOR SOLUTION ORAL at 08:08

## 2019-02-21 PROBLEM — H53.2 DIPLOPIA: Status: ACTIVE | Noted: 2019-02-21

## 2019-02-21 PROBLEM — G47.33 OBSTRUCTIVE SLEEP APNEA SYNDROME: Status: ACTIVE | Noted: 2019-02-21

## 2019-02-21 LAB
GLUCOSE SERPL-MCNC: 106 MG/DL (ref 65–140)
GLUCOSE SERPL-MCNC: 97 MG/DL (ref 65–140)

## 2019-02-21 PROCEDURE — 82948 REAGENT STRIP/BLOOD GLUCOSE: CPT

## 2019-02-21 RX ADMIN — POTASSIUM CHLORIDE 20 MEQ: 20 TABLET, EXTENDED RELEASE ORAL at 08:14

## 2019-02-21 RX ADMIN — VITAMIN D, TAB 1000IU (100/BT) 1000 UNITS: 25 TAB at 08:15

## 2019-02-21 RX ADMIN — BIMATOPROST 1 DROP: 0.1 SOLUTION/ DROPS OPHTHALMIC at 21:57

## 2019-02-21 RX ADMIN — NYSTATIN 1 APPLICATION: 100000 POWDER TOPICAL at 21:57

## 2019-02-21 RX ADMIN — METFORMIN HYDROCHLORIDE 1000 MG: 500 TABLET ORAL at 17:30

## 2019-02-21 RX ADMIN — MAGNESIUM OXIDE TAB 400 MG (241.3 MG ELEMENTAL MG) 400 MG: 400 (241.3 MG) TAB at 08:15

## 2019-02-21 RX ADMIN — METFORMIN HYDROCHLORIDE 1000 MG: 500 TABLET ORAL at 08:14

## 2019-02-21 RX ADMIN — ASPIRIN 81 MG 81 MG: 81 TABLET ORAL at 08:15

## 2019-02-21 RX ADMIN — CARBIDOPA AND LEVODOPA 1 TABLET: 25; 100 TABLET ORAL at 16:19

## 2019-02-21 RX ADMIN — METOPROLOL SUCCINATE 100 MG: 50 TABLET, EXTENDED RELEASE ORAL at 08:15

## 2019-02-21 RX ADMIN — ALLOPURINOL 300 MG: 300 TABLET ORAL at 08:14

## 2019-02-21 RX ADMIN — FUROSEMIDE 20 MG: 20 TABLET ORAL at 08:15

## 2019-02-21 RX ADMIN — POLYETHYLENE GLYCOL 3350 17 G: 17 POWDER, FOR SOLUTION ORAL at 08:16

## 2019-02-21 RX ADMIN — CYANOCOBALAMIN TAB 1000 MCG 1000 MCG: 1000 TAB at 08:15

## 2019-02-21 RX ADMIN — RIVAROXABAN 20 MG: 20 TABLET, FILM COATED ORAL at 17:31

## 2019-02-21 RX ADMIN — ATORVASTATIN CALCIUM 40 MG: 40 TABLET, FILM COATED ORAL at 17:31

## 2019-02-21 RX ADMIN — CARBIDOPA AND LEVODOPA 1 TABLET: 25; 100 TABLET ORAL at 12:09

## 2019-02-21 RX ADMIN — ACETAMINOPHEN 325 MG: 325 TABLET ORAL at 18:27

## 2019-02-21 RX ADMIN — BRIMONIDINE TARTRATE 1 DROP: 1.5 SOLUTION OPHTHALMIC at 08:16

## 2019-02-21 RX ADMIN — TAMSULOSIN HYDROCHLORIDE 0.4 MG: 0.4 CAPSULE ORAL at 17:31

## 2019-02-21 RX ADMIN — NYSTATIN: 100000 POWDER TOPICAL at 08:17

## 2019-02-21 RX ADMIN — Medication 1 TABLET: at 08:15

## 2019-02-21 RX ADMIN — CARBIDOPA AND LEVODOPA 1 TABLET: 25; 100 TABLET ORAL at 06:13

## 2019-02-21 NOTE — ASSESSMENT & PLAN NOTE
The patient has the history of abnormal gait, likely multifactorial but also likely with a large component from parkinsonism  He was recently diagnosed with primary parkinsonism and started on Sinemet  We proceed with the Sinemet, now at the increased dose of 1 pill (25/100) 3 times per day, with plans to increase again next week  He is tolerating the medication without significant side effects including no hallucinations no postural hypotension  Regarding mobility, he currently needs cues and supervision and rare minimal assistance for ambulation  He notes that his job requires that he be ambulatory and that it would be best if he could avoid using a cane  He does not use an upper limb assistive device by his choice  He needs to be independent with walking at home  He is medically stable to tolerate 3 hr therapy per day    We proceed with the program

## 2019-02-21 NOTE — ASSESSMENT & PLAN NOTE
Lab Results   Component Value Date    HGBA1C 5 9 09/28/2018    The patient is currently on metformin and off  insulin  Accu-Chek blood glucose values are acceptable  We will continue to follow the Accu-Chek blood glucose  The patient was very pleased to have the insulin discontinued  The Accu-Chek blood glucoses have been acceptable  We will continue the current regimen of metformin but no insulin  He is to follow up with his primary care physician      Recent Labs     02/19/19  1636 02/20/19  0604 02/20/19  1637 02/21/19  0617   POCGLU 93 93 95 97       Blood Sugar Average: Last 72 hrs:  (P) 99

## 2019-02-21 NOTE — NURSING NOTE
Pt is alert and oriented, he denies pain  Pt is independent with a single point cane  He will be discharged to home on 2/21/19  Pt will follow up with neurology, and possible outpatient sleep study  Lungs are clear on room air, no cough present  Skin is intact, pt has flat affect tet does smile at times, no tremor noted in his hands  Pt has been on sinemet for a few weeks, he also has double vision at times  He will see vision rehab as an out patient

## 2019-02-21 NOTE — ASSESSMENT & PLAN NOTE
Blood pressure is well controlled on the current regimen    He has not received lisinopril recently because of parameters for holds with blood pressure below 130

## 2019-02-21 NOTE — PROGRESS NOTES
Progress Note - Maida Fees 1950, 68 y o  male MRN: [id number]    Unit/Bed#: [facility] 265-01 Encounter: [id number]    Primary Care Provider: No primary care provider on file  Date and time admitted to hospital: 2/12/2019 10:51 AM        Obstructive sleep apnea syndrome  Assessment & Plan  Pulse ox  overnight is ordered for tonight    Diplopia  Assessment & Plan  As above    Chronic diastolic CHF (congestive heart failure) ([facility] )  Assessment & Plan  He has a history of CHF  We will monitor daily weights  He is currently on diuretic as well as beta-blocker and afterload reduction  He currently appears nearly euvolemic  He has lost about 5 lb since admission  We continue to monitor  Hypertrophic cardiomyopathy ([facility] )  Assessment & Plan  History of hypertrophic cardiomyopathy is noted  He is stable hemodynamically  Atrial fibrillation, chronic ([facility] )  Assessment & Plan  He has a history of atrial fibrillation  He was on amiodarone and this was recently discontinued  He continues on the beta-blocker  Will monitor the vital signs and hemodynamic clinical picture  He is on anticoagulation as well  He is hemodynamically stable on the current medications including beta-blocker  He is no longer on amiodarone  Diabetes mellitus type 2 with neurological manifestations [facility])  Assessment & Plan  Lab Results   Component Value Date    HGBA1C 5 9 09/28/2018    The patient is currently on metformin and off  insulin  Accu-Chek blood glucose values are acceptable  We will continue to follow the Accu-Chek blood glucose  The patient was very pleased to have the insulin discontinued  The Accu-Chek blood glucoses have been acceptable  We will continue the current regimen of metformin but no insulin  He is to follow up with his primary care physician      Recent Labs     02/19/19  1636 02/20/19  0604 02/20/19  1637 02/21/19  0617   POCGLU 93 93 95 97       Blood Sugar Average: Last 72 hrs:  (P) 99    HTN (hypertension)  Assessment & Plan  Blood pressure is well controlled on the current regimen  He has not received lisinopril recently because of parameters for holds with blood pressure below 130    Aspiration pneumonitis [facility])  Assessment & Plan  He has the recent history of aspiration pneumonitis  Currently, he has no cough and no fever  He denies any chest pain or shortness of breath  Lungs are clear to auscultation  Proceed with speech language pathology treatment and close monitoring of the clinical situation  Abnormal gait  Assessment & Plan  The patient has the history of abnormal gait, likely multifactorial but also likely with a large component from parkinsonism  He was recently diagnosed with primary parkinsonism and started on Sinemet  We proceed with the Sinemet, now at the increased dose of 1 pill (25/100) 3 times per day, with plans to increase again next week  He is tolerating the medication without significant side effects including no hallucinations no postural hypotension  Regarding mobility, he currently needs cues and supervision and rare minimal assistance for ambulation  He notes that his job requires that he be ambulatory and that it would be best if he could avoid using a cane  He does not use an upper limb assistive device by his choice  He needs to be independent with walking at home  He is medically stable to tolerate 3 hr therapy per day  We proceed with the program     * Parkinsonism [facility])  Assessment & Plan  As above, parkinsonism has been diagnosed  We proceed with rehabilitation therapies to improve function and gradually adjusting the dose of Sinemet and monitoring for its affect  He is currently on 25/100 strength pills, 1 pill 3 times per day  He has tolerated the medication  He notices improvement in his speech  He states that other people notice more significant improvement in his status, then he notices himself    We continue the medications for several days and then increase the dose as directed by Neurology  Gait quality is subjectively improved  His face is more animated during conversation and his voice is louder  Subjective/Objective     Subjective: The patient reports feeling well but still experiences difficulty related to the visual impairment  I explained that he was seen by Ophthalmology and Neurology in the recent past without definite diagnosis  We arranged for evaluation with rehabilitation Ophthalmology shortly after discharge  The patient also notes some difficulty with insomnia  In previous night he had difficulties with sleep due to nocturia or shortness of breath  Neither of these cause difficulty last night  He states that he simply had difficulty sleeping  He does have a history of sleep apnea  I will order overnight oximetry to assess this issue  The patient otherwise reports feeling fairly well  He denies any new chest pain or shortness of breath or lightheadedness or dizziness  He reports adequate bowel and bladder function  He reports good appetite  He is pleased to be off insulin  The Accu-Chek blood glucoses have been double  He denies other significant ENT, ophthalmological, cardiac, pulmonary, GI, , hematologic, dermatologic, psychiatric, neurologic, and musculoskeletal difficulties other than those consistent with the past medical history and the history of present illness  Objective:  Vitals: Blood pressure 124/69, pulse 74, temperature 98 4 °F (36 9 °C), temperature source Temporal, resp  rate 20, height 6' 5" (1 956 m), weight 133 kg (293 lb 3 2 oz), SpO2 94 %  ,Body mass index is 34 77 kg/m²  Intake/Output Summary (Last 24 hours) at 2/21/2019 1607  Last data filed at 2/21/2019 1257  Gross per 24 hour   Intake 580 ml   Output    Net 580 ml       Invasive Devices          None          Physical Exam: Vital signs are noted  Blood pressure and pulse are acceptable    Blood pressure is below parameters for lisinopril  Labs of yesterday are noted  Renal function appears to be acceptable  In general, he is a well-developed well-nourished individual sitting in the recliner chair next to the bed in no apparent distress  He speaks appropriately with the examiner  The head is normocephalic and atraumatic  The neck has functional range of motion  Mucous membranes are moist   Cardiac rhythm is regular without ectopy  Lungs are clear with good effort and no rhonchi or wheeze  Abdomen is soft and nontender  Bowel sounds are present  Limb examination reveals no significant edema  Both calves are nontender  Active range of motion is present throughout  Skin examination reveals no local breakdown  Neurologic examination reveals symptoms of parkinsonism including bradykinesia and abnormal gait posture  These abnormalities have improves last week  The face is now more expressive and gait has better speed and better posture  Lab, Imaging and other studies: I have personally reviewed pertinent reports      VTE Pharmacologic Prophylaxis: Reason for no pharmacologic prophylaxis He is on oral anticoagulant  VTE Mechanical Prophylaxis: reason for no mechanical VTE prophylaxis He is on oral anticoagulant

## 2019-02-21 NOTE — ASSESSMENT & PLAN NOTE
He has a history of CHF  We will monitor daily weights  He is currently on diuretic as well as beta-blocker and afterload reduction  He currently appears nearly euvolemic  He has lost about 5 lb since admission  We continue to monitor

## 2019-02-22 LAB
GLUCOSE SERPL-MCNC: 101 MG/DL (ref 65–140)
GLUCOSE SERPL-MCNC: 99 MG/DL (ref 65–140)

## 2019-02-22 PROCEDURE — 82948 REAGENT STRIP/BLOOD GLUCOSE: CPT

## 2019-02-22 PROCEDURE — 94762 N-INVAS EAR/PLS OXIMTRY CONT: CPT

## 2019-02-22 RX ADMIN — FUROSEMIDE 20 MG: 20 TABLET ORAL at 08:15

## 2019-02-22 RX ADMIN — ALLOPURINOL 300 MG: 300 TABLET ORAL at 08:15

## 2019-02-22 RX ADMIN — NYSTATIN: 100000 POWDER TOPICAL at 08:16

## 2019-02-22 RX ADMIN — BIMATOPROST 1 DROP: 0.1 SOLUTION/ DROPS OPHTHALMIC at 21:45

## 2019-02-22 RX ADMIN — MAGNESIUM OXIDE TAB 400 MG (241.3 MG ELEMENTAL MG) 400 MG: 400 (241.3 MG) TAB at 08:15

## 2019-02-22 RX ADMIN — BRIMONIDINE TARTRATE 1 DROP: 1.5 SOLUTION OPHTHALMIC at 08:15

## 2019-02-22 RX ADMIN — RIVAROXABAN 20 MG: 20 TABLET, FILM COATED ORAL at 17:42

## 2019-02-22 RX ADMIN — CARBIDOPA AND LEVODOPA 1 TABLET: 25; 100 TABLET ORAL at 16:34

## 2019-02-22 RX ADMIN — NYSTATIN 1 APPLICATION: 100000 POWDER TOPICAL at 21:40

## 2019-02-22 RX ADMIN — METFORMIN HYDROCHLORIDE 1000 MG: 500 TABLET ORAL at 08:14

## 2019-02-22 RX ADMIN — ACETAMINOPHEN 325 MG: 325 TABLET ORAL at 16:38

## 2019-02-22 RX ADMIN — ATORVASTATIN CALCIUM 40 MG: 40 TABLET, FILM COATED ORAL at 17:42

## 2019-02-22 RX ADMIN — ACETAMINOPHEN 325 MG: 325 TABLET ORAL at 11:26

## 2019-02-22 RX ADMIN — POTASSIUM CHLORIDE 20 MEQ: 20 TABLET, EXTENDED RELEASE ORAL at 08:15

## 2019-02-22 RX ADMIN — CARBIDOPA AND LEVODOPA 1 TABLET: 25; 100 TABLET ORAL at 12:17

## 2019-02-22 RX ADMIN — CYANOCOBALAMIN TAB 1000 MCG 1000 MCG: 1000 TAB at 08:14

## 2019-02-22 RX ADMIN — TAMSULOSIN HYDROCHLORIDE 0.4 MG: 0.4 CAPSULE ORAL at 17:43

## 2019-02-22 RX ADMIN — METOPROLOL SUCCINATE 100 MG: 50 TABLET, EXTENDED RELEASE ORAL at 08:14

## 2019-02-22 RX ADMIN — CARBIDOPA AND LEVODOPA 1 TABLET: 25; 100 TABLET ORAL at 06:03

## 2019-02-22 RX ADMIN — ASPIRIN 81 MG 81 MG: 81 TABLET ORAL at 08:15

## 2019-02-22 RX ADMIN — Medication 1 TABLET: at 08:14

## 2019-02-22 RX ADMIN — METFORMIN HYDROCHLORIDE 1000 MG: 500 TABLET ORAL at 17:42

## 2019-02-22 RX ADMIN — VITAMIN D, TAB 1000IU (100/BT) 1000 UNITS: 25 TAB at 08:14

## 2019-02-22 RX ADMIN — POLYETHYLENE GLYCOL 3350 17 G: 17 POWDER, FOR SOLUTION ORAL at 08:16

## 2019-02-22 NOTE — NURSING NOTE
Pt R elbow is red and sore, was not noted to be red this  Am when RN assessed the pt  Elbow pad applied tp R elbow and tylenol was administered  Pillow support will be used when pt is in chair to keep arm off of wood handle

## 2019-02-22 NOTE — NURSING NOTE
Patient connected to overnight pulse oximetry study started at 2245   Procedure explained to the patient by the respiratory therapist

## 2019-02-22 NOTE — NURSING NOTE
Patient complained of 8/10 R shoulder pain  only when moving  No pain when at rest  He said maybe because of therapy  Tylenol 325 mg po every 4 hours PRN for pain given  Will continue to monitor

## 2019-02-23 VITALS
WEIGHT: 269.38 LBS | BODY MASS INDEX: 31.81 KG/M2 | DIASTOLIC BLOOD PRESSURE: 68 MMHG | SYSTOLIC BLOOD PRESSURE: 129 MMHG | HEART RATE: 78 BPM | RESPIRATION RATE: 20 BRPM | HEIGHT: 77 IN | OXYGEN SATURATION: 95 % | TEMPERATURE: 96.7 F

## 2019-02-23 LAB — GLUCOSE SERPL-MCNC: 97 MG/DL (ref 65–140)

## 2019-02-23 PROCEDURE — 82948 REAGENT STRIP/BLOOD GLUCOSE: CPT

## 2019-02-23 RX ORDER — FUROSEMIDE 20 MG/1
20 TABLET ORAL DAILY
Qty: 30 TABLET | Refills: 0 | Status: ON HOLD | OUTPATIENT
Start: 2019-02-24 | End: 2020-02-19 | Stop reason: SDUPTHER

## 2019-02-23 RX ORDER — AMOXICILLIN 250 MG
2 CAPSULE ORAL 2 TIMES DAILY PRN
Qty: 100 TABLET | Refills: 0 | Status: SHIPPED | OUTPATIENT
Start: 2019-02-23 | End: 2021-09-10

## 2019-02-23 RX ORDER — BRIMONIDINE TARTRATE 0.15 %
1 DROPS OPHTHALMIC (EYE) DAILY
Qty: 5 ML | Refills: 0 | Status: SHIPPED | OUTPATIENT
Start: 2019-02-24

## 2019-02-23 RX ADMIN — ALLOPURINOL 300 MG: 300 TABLET ORAL at 08:16

## 2019-02-23 RX ADMIN — MAGNESIUM OXIDE TAB 400 MG (241.3 MG ELEMENTAL MG) 400 MG: 400 (241.3 MG) TAB at 08:16

## 2019-02-23 RX ADMIN — POLYETHYLENE GLYCOL 3350 17 G: 17 POWDER, FOR SOLUTION ORAL at 08:24

## 2019-02-23 RX ADMIN — BRIMONIDINE TARTRATE 1 DROP: 1.5 SOLUTION OPHTHALMIC at 08:24

## 2019-02-23 RX ADMIN — FUROSEMIDE 20 MG: 20 TABLET ORAL at 10:34

## 2019-02-23 RX ADMIN — DICLOFENAC 2 G: 10 GEL TOPICAL at 11:39

## 2019-02-23 RX ADMIN — ASPIRIN 81 MG 81 MG: 81 TABLET ORAL at 08:16

## 2019-02-23 RX ADMIN — NYSTATIN: 100000 POWDER TOPICAL at 08:24

## 2019-02-23 RX ADMIN — VITAMIN D, TAB 1000IU (100/BT) 1000 UNITS: 25 TAB at 08:16

## 2019-02-23 RX ADMIN — CARBIDOPA AND LEVODOPA 1 TABLET: 25; 100 TABLET ORAL at 11:39

## 2019-02-23 RX ADMIN — METOPROLOL SUCCINATE 100 MG: 50 TABLET, EXTENDED RELEASE ORAL at 10:33

## 2019-02-23 RX ADMIN — METFORMIN HYDROCHLORIDE 1000 MG: 500 TABLET ORAL at 08:16

## 2019-02-23 RX ADMIN — CARBIDOPA AND LEVODOPA 1 TABLET: 25; 100 TABLET ORAL at 06:10

## 2019-02-23 RX ADMIN — Medication 1 TABLET: at 08:16

## 2019-02-23 RX ADMIN — CYANOCOBALAMIN TAB 1000 MCG 1000 MCG: 1000 TAB at 08:16

## 2019-02-23 RX ADMIN — POTASSIUM CHLORIDE 20 MEQ: 20 TABLET, EXTENDED RELEASE ORAL at 08:16

## 2019-02-23 NOTE — NURSING NOTE
Pt called & requested to be turned on his back  Explained to pt he can turn in the bed as he wishes but to call us if he wants to get OOB even though made independent by therapy

## 2019-02-23 NOTE — NURSING NOTE
Pt assisted to transfer from recliner to bed  Pt got into bed knees first while being instructed to get into bed buttocks first  Pt stated Dr Samantha Reyes told him to do it this way  Explained to pt this is only a twin sized bed & pt almost hit head on siderail  Pt on his side call bell & joseinals within reach

## 2019-02-23 NOTE — NURSING NOTE
Pt found in chair on opposite side of room  Requested pt let us move chair so call bell within his reach  Pt said he will do whatever he d___ well pleases  Explained to pt we do not want him to fall but he is adamant that he will do what he wants  Will continue to round on pt every 15 minutes & try to calm down  Lucero bell & urinals placed on table next to chair so pt can call for assistance

## 2019-02-23 NOTE — NURSING NOTE
Patient's discharge instructions gone over with him his meds and what he needs to take tonight also his appointments with his various doctors  Patient d/maria isabel via w/c with therapy staff with his belongings

## 2019-03-11 ENCOUNTER — OFFICE VISIT (OUTPATIENT)
Dept: NEUROLOGY | Facility: CLINIC | Age: 69
End: 2019-03-11
Payer: MEDICARE

## 2019-03-11 VITALS
BODY MASS INDEX: 34.24 KG/M2 | SYSTOLIC BLOOD PRESSURE: 133 MMHG | DIASTOLIC BLOOD PRESSURE: 67 MMHG | WEIGHT: 290 LBS | HEIGHT: 77 IN

## 2019-03-11 DIAGNOSIS — G20 PARKINSON DISEASE (HCC): Primary | ICD-10-CM

## 2019-03-11 PROBLEM — G20.A1 PARKINSON'S DISEASE (HCC): Status: ACTIVE | Noted: 2018-10-30

## 2019-03-11 PROCEDURE — 99204 OFFICE O/P NEW MOD 45 MIN: CPT | Performed by: PSYCHIATRY & NEUROLOGY

## 2019-03-11 RX ORDER — LISINOPRIL 10 MG/1
TABLET ORAL
COMMUNITY
Start: 2019-02-27 | End: 2020-09-16 | Stop reason: ALTCHOICE

## 2019-03-11 RX ORDER — LISINOPRIL 5 MG/1
5 TABLET ORAL DAILY
COMMUNITY
End: 2020-02-19 | Stop reason: HOSPADM

## 2019-03-11 NOTE — LETTER
March 11, 2019     Rice Block, [address]    Patient: Meri Lombard   YOB: 1950   Date of Visit: 3/11/2019       Dear Dr Margarita Vásquez:    Thank you for referring Marissa Prince to me for evaluation  Below are my notes for this consultation  If you have questions, please do not hesitate to call me  I look forward to following your patient along with you  Sincerely,        Tae Lancaster MD        CC: MD Tae Villar MD  3/11/2019  5:05 PM  Sign at close encounter  Assessment/Plan:    Parkinson's disease [facility])  29-year-old man with past history significant for AFib, hypertrophic cardiomyopathy, ERROL and diabetes presents for evaluation tremor and gait dysfunction which began around 2016 or 2017, most consistent with a diagnosis of Parkinson's disease  The patient appears to be Sinemet responsive, which was started following a hospitalization for falls  We discussed the diagnosis and management Parkinson's disease  He is particularly interested in regaining the ability to drive  He is engaged and different therapies, including vision therapy and physical therapy  I placed a referral for fitness to drive assessment  Current dosing of Sinemet, 1 tab TID, appears appropriate  Diagnoses and all orders for this visit:    Parkinson disease [facility])  -     Ambulatory referral to Occupational Therapy; Future    Other orders  -     FOLIC ACID PO; 0 4 mg daily  -     glucose blood test strip; CHECK BLOOD SUGAR ONCE OR TWICE DAILY  E11 9 Z79 4  -     lisinopril (ZESTRIL) 10 mg tablet  -     lisinopril (ZESTRIL) 5 mg tablet; Take 5 mg by mouth daily        Subjective:     Patient ID: Meri Lombard is a 68 y o  male  I had the pleasure of seeing your patient, Meri Lombard in the Movement Disorders Clinic at the [facility]        The patient is a 68 y o  male who presents for evaluation of tremor and gait dysfunction  It appears the patient's tremor began in late 2016 early 2017  Presented in the bilateral thumbs worse when holding objects supported by just his thumb  He followed with Dr Kolby Bartholomew for about 1 year  Diagnosis of Parkinson's disease was made however given how mild his symptoms were at that time no medications were started  It appears that he had a fall in October of 2018  And then again in February of 2019  He was during this 2nd hospitalization that carbidopa levodopa was initiated  Patient is self does not notice any improvement with this medication however he reports "everybody tells be I'm better"  He has not had any major falls since starting the medicine  Other people tell him that his posture and gait appear improved  During the patient's 1st fall he fell down 12 steps and likely suffered a concussion with loss of consciousness  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 - ""  Lives with his wife  3 kids in the area, 3 grand kids     Previous medication trials:   Sinemet @ 9 12 6-9p    Exposure to neuroleptics, pesticides, toxins, metals:  no     Regarding motor symptoms:   Tremor:  bl thumbs   Slowness:  maybe, not that he notices   Stiffness: just arthritis in shoulders  L shoulder replaced   Dystonia: therapist have commented that his feet turn in a funny way   Changes in facial expression: no   Changes in voice: yes, softer   Changes in writing: yes, smaller as he goes   Changes in gait:  balance troubles,   Falls: down the stairs, 2018  Last fall was 2-3 weeks ago  Freezing: no   Trouble with swallowing: cough when he eats  Went through SLP     Clumsiness/dexterity: maybe difficult with buttons     Regarding non-motor symptoms:   Anosmia: no   Constipation: always been an issue   Urinary sx: nocturia x2  Lightheadedness: occasionally a moment of dizziness when he first gets up   Changes in Mood: good   Trouble with sleep:     REM behavior Disorder: not sure  Wakes up in the same position he went to sleep in  Memory trouble: no   Hallucinations: no     Double vision issues     PT and vision rehab both up coming  The following portions of the patient's history were reviewed and updated as appropriate: allergies, current medications, past family history, past medical history, past social history, past surgical history and problem list       Objective:      /67 (BP Location: Left arm, Patient Position: Standing, Cuff Size: Standard)   Ht 6' 5" (1 956 m)   Wt 132 kg (290 lb)   BMI 34 39 kg/m²    Not Orthostatic today by BP  Physical Exam    Neurological Exam    GENERAL MEDICAL EXAMINATION:  General appearance: alert, in no apparent distress  Appropriately dressed and groomed  Conversing and interacting appropriately  Eyes: Sclera are non-injected  Ears, nose, Mouth, Throat: Mucous membranes are moist    Resp: Breathing comfortably on RA   Musculoskeletal: No evidence of deformities  No contractures  No Edema  Skin: No visible rashes  Warm and well perfused  Psych: normal and appropriate affect     Mental Status:  Alert and oriented to person place and time  Able to relate history without difficulty  Attentive to conversation  Language is fluent and appropriate with normal prosody  There were no paraphasic errors  Speech was not dysarthric  Able to follow both midline and appendicular commands  General Neurology examination:   EOMI, Face activates symmetrically  Patient moves all 4 extremities equally and antigravity, full strength on formal confrontational testing  No pronator drift  Normal sensation to light touch and temperature in all 4 extremities  Finger to nose without dysmetria bilaterally       UPDRS motor:                              Time since last dose:  3     Speech  1     Facial Expression  3     Rigidity - Neck  1     Rigidity - Upper Extremity (Right)  2     Rigidity - Upper Extremity (Left)   1     Rigidity - Lower Extremity (Right)  0     Rigidity - Lower Extremity (Left)   0     Finger Taps (Right)   2     Finger Taps (Left)   2     Hand Movement (Right)  1     Hand Movement (Left)   1     Pronation/Supination (Right)  1     Pronation/Supination (Left)   1     Toe Tapping (Right) 2     Toe Tapping (Left) 2     Leg Agility (Right)  1     Leg Agility (Left)   1     Arising from Chair   1     Gait   1     Freezing of Gait 0     Postural Stability        Posture 1     Global spontaneity of movement 3     Postural Tremor (Right) 0     Postural Tremor (Left) 0     Kinetic Tremor (Right)  1     Kinetic Tremor (Left)  1     Rest tremor amplitude RUE 1     Rest tremor amplitude LUE 0     Rest tremor amplitude RLE 0     Reset tremor amplitude LLE 0     Lip/Jaw Tremor  0     Consistency of tremor 1     Motor Exam Total:          Slow initiation  Reduced step height>stride length  Reduced arm swing bl  Review of Systems   Constitutional: Negative  Negative for appetite change and fever  HENT: Positive for hearing loss and trouble swallowing  Negative for tinnitus and voice change  Eyes: Negative  Negative for photophobia and pain  Dry eyes     Respiratory: Positive for shortness of breath  Cardiovascular: Negative  Negative for palpitations  Gastrointestinal: Negative  Negative for nausea and vomiting  Endocrine: Negative  Negative for cold intolerance and heat intolerance  Erection problems   Genitourinary: Negative  Negative for dysuria, frequency and urgency  Musculoskeletal: Positive for gait problem (immobility or loss of function and balance problems, and falls) and joint swelling  Negative for myalgias and neck pain  Skin: Negative  Negative for rash  Neurological: Positive for tremors and light-headedness  Negative for dizziness, seizures, syncope, facial asymmetry, speech difficulty, weakness, numbness and headaches     Hematological: Bruises/bleeds easily  Psychiatric/Behavioral: Negative for confusion and hallucinations  Sleep disturbance: waking up at night and trouble falling asleep  The above ROS was reviewed and updated  Santo Lr MD  Medical Director   [facility]  Movement and Memory Specialist       Current Outpatient Medications on File Prior to Visit   Medication Sig Dispense Refill    allopurinol (ZYLOPRIM) 300 mg tablet Take 300 mg by mouth daily   aspirin 81 MG tablet Take 81 mg by mouth daily      atorvastatin (LIPITOR) 40 mg tablet Take 40 mg by mouth every evening      Bimatoprost (LUMIGAN OP) Apply 1 drop to eye daily at bedtime Both eyes       brimonidine (ALPHAGAN P) 0 15 % ophthalmic solution Administer 1 drop to the right eye daily 5 mL 0    carbidopa-levodopa (SINEMET)  mg per tablet Take 1 tablet by mouth 3 (three) times a day before meals 90 tablet 0    cholecalciferol 1000 units tablet Take 1 tablet (1,000 Units total) by mouth daily 30 tablet 0    cyanocobalamin 1000 MCG tablet Take 1 tablet (1,000 mcg total) by mouth daily 30 tablet 0    furosemide (LASIX) 20 mg tablet Take 1 tablet (20 mg total) by mouth daily Do NOT take if systolic BP is under 822 30 tablet 0    glucose blood test strip CHECK BLOOD SUGAR ONCE OR TWICE DAILY  E11 9 Z79 4      lisinopril (ZESTRIL) 10 mg tablet       lisinopril (ZESTRIL) 5 mg tablet Take 5 mg by mouth daily      magnesium oxide (MAG-OX) 400 mg Take 400 mg by mouth daily      metFORMIN (GLUCOPHAGE) 1000 MG tablet Take 1,000 mg by mouth 2 (two) times a day with meals   metoprolol succinate (TOPROL-XL) 100 mg 24 hr tablet Take 1 tablet (100 mg total) by mouth daily 30 tablet 0    Multiple Vitamin (MULTIVITAMIN) tablet Take 1 tablet by mouth daily        potassium chloride (K-DUR,KLOR-CON) 20 mEq tablet Take 20 mEq by mouth daily      rivaroxaban (XARELTO) 20 mg tablet Take 1 tablet (20 mg total) by mouth daily with dinner 30 tablet 0    tamsulosin (FLOMAX) 0 4 mg Take 1 capsule (0 4 mg total) by mouth daily with dinner 30 capsule 0    FOLIC ACID PO 0 4 mg daily      polyethylene glycol (MIRALAX) 17 g packet Take 17 g by mouth daily (Patient not taking: Reported on 3/11/2019) 14 each 0    senna-docusate sodium (SENOKOT S) 8 6-50 mg per tablet Take 2 tablets by mouth 2 (two) times a day as needed for constipation (Patient not taking: Reported on 3/11/2019) 100 tablet 0     No current facility-administered medications on file prior to visit

## 2019-03-11 NOTE — ASSESSMENT & PLAN NOTE
12-year-old man with past history significant for AFib, hypertrophic cardiomyopathy, ERROL and diabetes presents for evaluation tremor and gait dysfunction which began around 2016 or 2017, most consistent with a diagnosis of Parkinson's disease  The patient appears to be Sinemet responsive, which was started following a hospitalization for falls  We discussed the diagnosis and management Parkinson's disease  He is particularly interested in regaining the ability to drive  He is engaged and different therapies, including vision therapy and physical therapy  I placed a referral for fitness to drive assessment  Current dosing of Sinemet, 1 tab TID, appears appropriate

## 2019-03-11 NOTE — PROGRESS NOTES
Assessment/Plan:    Parkinson's disease [facility])  39-year-old man with past history significant for AFib, hypertrophic cardiomyopathy, ERROL and diabetes presents for evaluation tremor and gait dysfunction which began around 2016 or 2017, most consistent with a diagnosis of Parkinson's disease  The patient appears to be Sinemet responsive, which was started following a hospitalization for falls  We discussed the diagnosis and management Parkinson's disease  He is particularly interested in regaining the ability to drive  He is engaged and different therapies, including vision therapy and physical therapy  I placed a referral for fitness to drive assessment  Current dosing of Sinemet, 1 tab TID, appears appropriate  Diagnoses and all orders for this visit:    Parkinson disease [facility])  -     Ambulatory referral to Occupational Therapy; Future    Other orders  -     FOLIC ACID PO; 0 4 mg daily  -     glucose blood test strip; CHECK BLOOD SUGAR ONCE OR TWICE DAILY  E11 9 Z79 4  -     lisinopril (ZESTRIL) 10 mg tablet  -     lisinopril (ZESTRIL) 5 mg tablet; Take 5 mg by mouth daily        Subjective:     Patient ID: Andrew Bravo is a 68 y o  male  I had the pleasure of seeing your patient, Andrew Bravo in the Movement Disorders Clinic at the [facility] for [facility]  The patient is a 68 y o  male who presents for evaluation of tremor and gait dysfunction  It appears the patient's tremor began in late 2016 early 2017  Presented in the bilateral thumbs worse when holding objects supported by just his thumb  He followed with Dr Severiano Pedro for about 1 year  Diagnosis of Parkinson's disease was made however given how mild his symptoms were at that time no medications were started  It appears that he had a fall in October of 2018  And then again in February of 2019  He was during this 2nd hospitalization that carbidopa levodopa was initiated    Patient is self does not notice any improvement with this medication however he reports "everybody tells be I'm better"  He has not had any major falls since starting the medicine  Other people tell him that his posture and gait appear improved  During the patient's 1st fall he fell down 12 steps and likely suffered a concussion with loss of consciousness  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 - ""  Lives with his wife  3 kids in the area, 3 grand kids     Previous medication trials:   Sinemet @ 9 12 6-9p    Exposure to neuroleptics, pesticides, toxins, metals: no     Regarding motor symptoms:   Tremor: bl thumbs   Slowness: maybe, not that he notices   Stiffness: just arthritis in shoulders  L shoulder replaced   Dystonia: therapist have commented that his feet turn in a funny way   Changes in facial expression: no   Changes in voice: yes, softer   Changes in writing: yes, smaller as he goes   Changes in gait: balance troubles,   Falls: down the stairs, 2018  Last fall was 2-3 weeks ago  Freezing: no   Trouble with swallowing: cough when he eats  Went through SLP  Clumsiness/dexterity: maybe difficult with buttons     Regarding non-motor symptoms:   Anosmia: no   Constipation: always been an issue   Urinary sx: nocturia x2  Lightheadedness: occasionally a moment of dizziness when he first gets up   Changes in Mood: good   Trouble with sleep:     REM behavior Disorder: not sure  Wakes up in the same position he went to sleep in  Memory trouble: no   Hallucinations: no     Double vision issues     PT and vision rehab both up coming       The following portions of the patient's history were reviewed and updated as appropriate: allergies, current medications, past family history, past medical history, past social history, past surgical history and problem list       Objective:      /67 (BP Location: Left arm, Patient Position: Standing, Cuff Size: Standard)   Ht 6' 5" (1 956 m)   Wt 132 kg (290 lb) BMI 34 39 kg/m²   Not Orthostatic today by BP  Physical Exam    Neurological Exam    GENERAL MEDICAL EXAMINATION:  General appearance: alert, in no apparent distress  Appropriately dressed and groomed  Conversing and interacting appropriately  Eyes: Sclera are non-injected  Ears, nose, Mouth, Throat: Mucous membranes are moist    Resp: Breathing comfortably on RA   Musculoskeletal: No evidence of deformities  No contractures  No Edema  Skin: No visible rashes  Warm and well perfused  Psych: normal and appropriate affect     Mental Status:  Alert and oriented to person place and time  Able to relate history without difficulty  Attentive to conversation  Language is fluent and appropriate with normal prosody  There were no paraphasic errors  Speech was not dysarthric  Able to follow both midline and appendicular commands  General Neurology examination:   EOMI, Face activates symmetrically  Patient moves all 4 extremities equally and antigravity, full strength on formal confrontational testing  No pronator drift  Normal sensation to light touch and temperature in all 4 extremities  Finger to nose without dysmetria bilaterally       UPDRS motor:                              Time since last dose:  3     Speech  1     Facial Expression  3     Rigidity - Neck  1     Rigidity - Upper Extremity (Right)  2     Rigidity - Upper Extremity (Left)   1     Rigidity - Lower Extremity (Right)  0     Rigidity - Lower Extremity (Left)   0     Finger Taps (Right)   2     Finger Taps (Left)   2     Hand Movement (Right)  1     Hand Movement (Left)   1     Pronation/Supination (Right)  1     Pronation/Supination (Left)   1     Toe Tapping (Right) 2     Toe Tapping (Left) 2     Leg Agility (Right)  1     Leg Agility (Left)   1     Arising from Chair   1     Gait   1     Freezing of Gait 0     Postural Stability        Posture 1     Global spontaneity of movement 3     Postural Tremor (Right) 0     Postural Tremor (Left) 0     Kinetic Tremor (Right)  1     Kinetic Tremor (Left)  1     Rest tremor amplitude RUE 1     Rest tremor amplitude LUE 0     Rest tremor amplitude RLE 0     Reset tremor amplitude LLE 0     Lip/Jaw Tremor  0     Consistency of tremor 1     Motor Exam Total:          Slow initiation  Reduced step height>stride length  Reduced arm swing bl  Review of Systems   Constitutional: Negative  Negative for appetite change and fever  HENT: Positive for hearing loss and trouble swallowing  Negative for tinnitus and voice change  Eyes: Negative  Negative for photophobia and pain  Dry eyes     Respiratory: Positive for shortness of breath  Cardiovascular: Negative  Negative for palpitations  Gastrointestinal: Negative  Negative for nausea and vomiting  Endocrine: Negative  Negative for cold intolerance and heat intolerance  Erection problems   Genitourinary: Negative  Negative for dysuria, frequency and urgency  Musculoskeletal: Positive for gait problem (immobility or loss of function and balance problems, and falls) and joint swelling  Negative for myalgias and neck pain  Skin: Negative  Negative for rash  Neurological: Positive for tremors and light-headedness  Negative for dizziness, seizures, syncope, facial asymmetry, speech difficulty, weakness, numbness and headaches  Hematological: Bruises/bleeds easily  Psychiatric/Behavioral: Negative for confusion and hallucinations  Sleep disturbance: waking up at night and trouble falling asleep  The above ROS was reviewed and updated  Edwige Lagunas MD  Medical Director   [facility]  Movement and Memory Specialist       Current Outpatient Medications on File Prior to Visit   Medication Sig Dispense Refill    allopurinol (ZYLOPRIM) 300 mg tablet Take 300 mg by mouth daily        aspirin 81 MG tablet Take 81 mg by mouth daily      atorvastatin (LIPITOR) 40 mg tablet Take 40 mg by mouth every evening      Bimatoprost (LUMIGAN OP) Apply 1 drop to eye daily at bedtime Both eyes       brimonidine (ALPHAGAN P) 0 15 % ophthalmic solution Administer 1 drop to the right eye daily 5 mL 0    carbidopa-levodopa (SINEMET)  mg per tablet Take 1 tablet by mouth 3 (three) times a day before meals 90 tablet 0    cholecalciferol 1000 units tablet Take 1 tablet (1,000 Units total) by mouth daily 30 tablet 0    cyanocobalamin 1000 MCG tablet Take 1 tablet (1,000 mcg total) by mouth daily 30 tablet 0    furosemide (LASIX) 20 mg tablet Take 1 tablet (20 mg total) by mouth daily Do NOT take if systolic BP is under 083 30 tablet 0    glucose blood test strip CHECK BLOOD SUGAR ONCE OR TWICE DAILY  E11 9 Z79 4      lisinopril (ZESTRIL) 10 mg tablet       lisinopril (ZESTRIL) 5 mg tablet Take 5 mg by mouth daily      magnesium oxide (MAG-OX) 400 mg Take 400 mg by mouth daily      metFORMIN (GLUCOPHAGE) 1000 MG tablet Take 1,000 mg by mouth 2 (two) times a day with meals   metoprolol succinate (TOPROL-XL) 100 mg 24 hr tablet Take 1 tablet (100 mg total) by mouth daily 30 tablet 0    Multiple Vitamin (MULTIVITAMIN) tablet Take 1 tablet by mouth daily   potassium chloride (K-DUR,KLOR-CON) 20 mEq tablet Take 20 mEq by mouth daily      rivaroxaban (XARELTO) 20 mg tablet Take 1 tablet (20 mg total) by mouth daily with dinner 30 tablet 0    tamsulosin (FLOMAX) 0 4 mg Take 1 capsule (0 4 mg total) by mouth daily with dinner 30 capsule 0    FOLIC ACID PO 0 4 mg daily      polyethylene glycol (MIRALAX) 17 g packet Take 17 g by mouth daily (Patient not taking: Reported on 3/11/2019) 14 each 0    senna-docusate sodium (SENOKOT S) 8 6-50 mg per tablet Take 2 tablets by mouth 2 (two) times a day as needed for constipation (Patient not taking: Reported on 3/11/2019) 100 tablet 0     No current facility-administered medications on file prior to visit

## 2019-03-19 DIAGNOSIS — G20 PARKINSON'S DISEASE (HCC): ICD-10-CM

## 2019-03-19 NOTE — TELEPHONE ENCOUNTER
Pt's PCP office called and asked that we take over prescribing pt sinimet since we are treating him for PD  He requires a refill at this time

## 2019-03-28 DIAGNOSIS — N40.0 BENIGN PROSTATIC HYPERPLASIA WITHOUT LOWER URINARY TRACT SYMPTOMS: ICD-10-CM

## 2019-03-28 RX ORDER — TAMSULOSIN HYDROCHLORIDE 0.4 MG/1
CAPSULE ORAL
Qty: 60 CAPSULE | Refills: 4 | Status: SHIPPED | OUTPATIENT
Start: 2019-03-28 | End: 2019-09-13 | Stop reason: SDUPTHER

## 2019-04-17 ENCOUNTER — OFFICE VISIT (OUTPATIENT)
Dept: OCCUPATIONAL THERAPY | Facility: CLINIC | Age: 69
End: 2019-04-17
Payer: MEDICARE

## 2019-04-17 DIAGNOSIS — G20 PARKINSON'S DISEASE (HCC): ICD-10-CM

## 2019-04-17 DIAGNOSIS — G20 PARKINSON DISEASE (HCC): Primary | ICD-10-CM

## 2019-04-17 PROCEDURE — 97165 OT EVAL LOW COMPLEX 30 MIN: CPT

## 2019-04-17 PROCEDURE — 96125 COGNITIVE TEST BY HC PRO: CPT

## 2019-04-24 ENCOUNTER — TELEPHONE (OUTPATIENT)
Dept: NEUROLOGY | Facility: CLINIC | Age: 69
End: 2019-04-24

## 2019-06-17 ENCOUNTER — OFFICE VISIT (OUTPATIENT)
Dept: NEUROLOGY | Facility: CLINIC | Age: 69
End: 2019-06-17
Payer: MEDICARE

## 2019-06-17 VITALS
HEIGHT: 77 IN | WEIGHT: 299 LBS | HEART RATE: 69 BPM | SYSTOLIC BLOOD PRESSURE: 124 MMHG | BODY MASS INDEX: 35.3 KG/M2 | DIASTOLIC BLOOD PRESSURE: 62 MMHG

## 2019-06-17 DIAGNOSIS — R06.02 SHORTNESS OF BREATH: ICD-10-CM

## 2019-06-17 DIAGNOSIS — G20 PARKINSON'S DISEASE (HCC): Primary | ICD-10-CM

## 2019-06-17 PROCEDURE — 99214 OFFICE O/P EST MOD 30 MIN: CPT | Performed by: PSYCHIATRY & NEUROLOGY

## 2019-06-17 PROCEDURE — 1124F ACP DISCUSS-NO DSCNMKR DOCD: CPT | Performed by: PSYCHIATRY & NEUROLOGY

## 2019-09-13 DIAGNOSIS — N40.0 BENIGN PROSTATIC HYPERPLASIA WITHOUT LOWER URINARY TRACT SYMPTOMS: ICD-10-CM

## 2019-09-16 RX ORDER — TAMSULOSIN HYDROCHLORIDE 0.4 MG/1
CAPSULE ORAL
Qty: 180 CAPSULE | Refills: 1 | Status: SHIPPED | OUTPATIENT
Start: 2019-09-16

## 2019-09-19 NOTE — ASSESSMENT & PLAN NOTE
Lab Results   Component Value Date    HGBA1C 6 3 04/22/2017       Recent Labs      10/31/18   1609  10/31/18   2106  11/01/18   0622  11/01/18   1122   POCGLU  163*  191*  144*  188*       Blood Sugar Average: Last 72 hrs:  (P) 161 [id number] Gastric disorder  gastric hernia repair- 3/16/12  S/P arthroscopy of shoulder  right rotator cuff repair- 2006  S/P arthroscopy of shoulder  2001- left rotator cuff repair  S/P CABG x 3  8/18/11  S/P discectomy  lumbar- 1996  S/P hysterectomy  1972

## 2019-12-06 ENCOUNTER — TELEPHONE (OUTPATIENT)
Dept: NEUROLOGY | Facility: CLINIC | Age: 69
End: 2019-12-06

## 2019-12-06 NOTE — TELEPHONE ENCOUNTER
Received request for records from [facility] [employer]  Scanned into chart and faxed to [phone] on 12/6

## 2019-12-17 ENCOUNTER — TELEPHONE (OUTPATIENT)
Dept: NEUROLOGY | Facility: CLINIC | Age: 69
End: 2019-12-17

## 2019-12-17 NOTE — TELEPHONE ENCOUNTER
tom from [facility] called and states that she spoke to someone in our office and they advised that we do have records on this pt and they now received a notice that we don't have records  records request under media is requesting records form 9/1/19 to present  i made her aware that pt was last seen in june so there are no records after 9/1

## 2019-12-23 ENCOUNTER — TELEPHONE (OUTPATIENT)
Dept: NEUROLOGY | Facility: CLINIC | Age: 69
End: 2019-12-23

## 2019-12-23 ENCOUNTER — OFFICE VISIT (OUTPATIENT)
Dept: NEUROLOGY | Facility: CLINIC | Age: 69
End: 2019-12-23
Payer: MEDICARE

## 2019-12-23 VITALS
BODY MASS INDEX: 40.9 KG/M2 | HEART RATE: 60 BPM | WEIGHT: 302 LBS | SYSTOLIC BLOOD PRESSURE: 120 MMHG | DIASTOLIC BLOOD PRESSURE: 58 MMHG | HEIGHT: 72 IN

## 2019-12-23 DIAGNOSIS — G20 PARKINSON'S DISEASE (HCC): Primary | ICD-10-CM

## 2019-12-23 DIAGNOSIS — I48.20 ATRIAL FIBRILLATION, CHRONIC (HCC): ICD-10-CM

## 2019-12-23 DIAGNOSIS — I50.32 CHRONIC DIASTOLIC CHF (CONGESTIVE HEART FAILURE) (HCC): ICD-10-CM

## 2019-12-23 DIAGNOSIS — G47.33 OBSTRUCTIVE SLEEP APNEA SYNDROME: ICD-10-CM

## 2019-12-23 DIAGNOSIS — E11.49 DIABETES MELLITUS TYPE 2 WITH NEUROLOGICAL MANIFESTATIONS (HCC): ICD-10-CM

## 2019-12-23 DIAGNOSIS — H53.2 DIPLOPIA: ICD-10-CM

## 2019-12-23 PROCEDURE — 99214 OFFICE O/P EST MOD 30 MIN: CPT | Performed by: PSYCHIATRY & NEUROLOGY

## 2019-12-23 NOTE — ASSESSMENT & PLAN NOTE
Unclear etiology  This could be a decompensated for E out or convergence insufficiency  It is having negative impact on the patient however he was able to pass his repeat driving assessment and apparently was cleared by Ophthalmology to continue driving  Prior notes regarding visual rehabilitation which I agree with  Will also give the patient contact information [facility]  On exam is dysconjugate gaze is variable so he is unlikely to be a candidate for prisms   -   MRI brain given vascular risk factors and new onset diplopia  Last neuroimaging was 1 year ago

## 2019-12-23 NOTE — ASSESSMENT & PLAN NOTE
68-year-old man with multiple medical comorbidities presents in follow-up for Parkinson's disease  He has had a decline in his functional capacity  Continues to have falls  The patient's gait dysfunction is likely multifactorial   We discussed safety and fall precautions  I encouraged the patient to use his walker instead of his cane whenever possible  He has most of his falls at work and does not want to bring a walker to work  Not interested in repeating physical therapy at this point  Exam does show progression of bradykinesia  Tremor and rigidity remain fairly well controlled  Given improvement noticed by his family with the increased dose of Sinemet will increase the medication further to 2 tabs 3 times a day  The patient has an evaluation scheduled at [facility] in the Movement Disorder group at the end of April for a 2nd opinion which is of course completely reasonable

## 2019-12-23 NOTE — TELEPHONE ENCOUNTER
Pt called stated that he is trying to get his MRI scheduled, he is currently trying to get his pacemaker info from [facility]  He just wanted to let you know he is in the process of it   He will be having it at [place] [id number]

## 2019-12-23 NOTE — PATIENT INSTRUCTIONS
1) Eye specialists (Neuro-Ophthalmology)    CEE Galicia    [facility]  [address] [place], [place]  Phone: 30 [facility], [place]  [facility]  [address] [facility] [address], [address] [facility] [address]  Director, Neuro-Ophthalmology Service  Phone: [phone]    2) we will get an MRI to make sure we are not missing anything   3) increase your medication (sinemet) to 2 tablets three times per day

## 2019-12-23 NOTE — TELEPHONE ENCOUNTER
At Check Out today after visit with Dr Reba Kumar on 12/23/2019 at [facility], Dr Reba Kumar ordered an MRI brain w/o contrast  Unable to schedule due to missing information of his cardiac defibrillator which is needed to schedule with [facility]  Informed patient to look at home for this information and call me back with it so I can assist with Central Scheduling to schedule MRI brain  Patient understood and agreed to look when he gets home  Also, he will try to contact [address] [place] [address] Cardiology if he cannot locate the information at home

## 2019-12-23 NOTE — PROGRESS NOTES
Assessment/Plan:    Diplopia   Unclear etiology  This could be a decompensated for E out or convergence insufficiency  It is having negative impact on the patient however he was able to pass his repeat driving assessment and apparently was cleared by Ophthalmology to continue driving  Prior notes regarding visual rehabilitation which I agree with  Will also give the patient contact information for Neuro-Ophthalmology either locally or [facility]  On exam is dysconjugate gaze is variable so he is unlikely to be a candidate for prisms   -   MRI brain given vascular risk factors and new onset diplopia  Last neuroimaging was 1 year ago  Parkinson's disease [facility])    75-year-old man with multiple medical comorbidities presents in follow-up for Parkinson's disease  He has had a decline in his functional capacity  Continues to have falls  The patient's gait dysfunction is likely multifactorial   We discussed safety and fall precautions  I encouraged the patient to use his walker instead of his cane whenever possible  He has most of his falls at work and does not want to bring a walker to work  Not interested in repeating physical therapy at this point  Exam does show progression of bradykinesia  Tremor and rigidity remain fairly well controlled  Given improvement noticed by his family with the increased dose of Sinemet will increase the medication further to 2 tabs 3 times a day  The patient has an evaluation scheduled at [place] [address] in the Movement Disorder group at the end of April for a 2nd opinion which is of course completely reasonable  Diagnoses and all orders for this visit:    Parkinson's disease ([facility] [address] )  -     MRI brain without contrast; Future  -     carbidopa-levodopa (SINEMET)  mg per tablet;  Take 2 tablets by mouth 3 (three) times a day before meals    Obstructive sleep apnea syndrome    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)    Atrial fibrillation, chronic    Chronic diastolic CHF (congestive heart failure) ([facility])    Diplopia        Subjective:     Patient ID: Deirdre Jackson is a 68 y o  male  I had the pleasure of seeing your patient, Deirdre Jackson in the Movement Disorders Clinic at the [address] [place] [address] Neuroscience  Lisa Ahumada is a 68year-old man with AFib, hypertrophic cardiomyopathy, ERROL and diabetes with diabetic neuropathy who presents in follow-up for levodopa responsive Parkinson's disease, symptom onset in 2016/2017 with bl thumb tremor and gait dysfunction  Current medications and timing:  Sinemet 25/100, 1 5 tab TID @ 9 12 6-9p (QHS is 11-MN)      Interval history:  Wife said she noticed some improvement with the increase to 1 5 tabs  Louder voice, but the patient did not notice anything  Some double vision and trouble reading fine print  Saw ophthalmology and reportedly had normal vision examination  Did a lot of PT including an inpatient say but his balance is still very bed  PPM placement over this past summer  [facility] Battiest twice in the past 2-3 weeks  Regarding motor symptoms:   Tremor: under good control  Slowness: stable   Stiffness: not really   Trouble with swallowing: some coughing on dry cereal  Has SLP in the past      Driving issues: cleared by other doctors, retook testing     Regarding medication complications:  Wearing off: no  No kicking in or wearing off   Dyskinesias: no       The following portions of the patient's history were reviewed and updated as appropriate: allergies, current medications, past family history, past medical history, past social history, past surgical history and problem list       Objective:  /58 (BP Location: Right arm, Patient Position: Standing, Cuff Size: Standard)   Pulse 60   Ht 6' (1 829 m)   Wt (!) 137 kg (302 lb)   BMI 40 96 kg/m²     Physical Exam    Neurological Exam    Intermitant dysconjugate gaze, exophoria       UPDRS motor: Time since last dose:  6     Speech  2     Facial Expression  3     Rigidity - Neck  1     Rigidity - Upper Extremity (Right)  1     Rigidity - Upper Extremity (Left)   2     Rigidity - Lower Extremity (Right)  0     Rigidity - Lower Extremity (Left)   0     Finger Taps (Right)   2     Finger Taps (Left)   2     Hand Movement (Right)  2     Hand Movement (Left)   3     Pronation/Supination (Right)  1     Pronation/Supination (Left)   1     Toe Tapping (Right) 1     Toe Tapping (Left) 2     Leg Agility (Right)  1     Leg Agility (Left)   1     Arising from Chair   3     Gait   3     Freezing of Gait 0     Postural Stability   1     Posture 1     Global spontaneity of movement 2     Postural Tremor (Right) 0     Postural Tremor (Left) 0     Kinetic Tremor (Right)  0     Kinetic Tremor (Left)  0     Rest tremor amplitude RUE 0     Rest tremor amplitude LUE 0     Rest tremor amplitude RLE 0     Reset tremor amplitude LLE 0     Lip/Jaw Tremor  0     Consistency of tremor 0     Motor Exam Total:          Review of Systems   Constitutional: Negative  Negative for appetite change and fever  HENT: Negative  Negative for hearing loss, tinnitus, trouble swallowing and voice change  Eyes: Positive for visual disturbance (blurred and double vision)  Negative for photophobia and pain  Dry eyes     Respiratory: Positive for cough and shortness of breath  Cardiovascular: Negative  Negative for palpitations  Gastrointestinal: Negative  Negative for nausea and vomiting  Endocrine: Negative  Negative for cold intolerance and heat intolerance  Genitourinary: Negative  Negative for dysuria, frequency and urgency  Musculoskeletal: Positive for gait problem (immobility or loss of function, pain while walking, falls, balance problems, and difficulty walking), joint swelling and neck pain  Negative for myalgias  Skin: Negative  Negative for rash     Neurological: Positive for tremors and light-headedness  Negative for dizziness, seizures, syncope, facial asymmetry, speech difficulty, weakness, numbness and headaches  Clumsiness     Hematological: Negative  Does not bruise/bleed easily  Psychiatric/Behavioral: Positive for sleep disturbance (waking up at night and trouble falling asleep)  Negative for confusion and hallucinations  The above ROS was reviewed and updated  Richy Shaffer MD  Medical Director   [facility] Disorders Center  Movement and Memory Specialist       Current Outpatient Medications on File Prior to Visit   Medication Sig Dispense Refill    allopurinol (ZYLOPRIM) 300 mg tablet Take 300 mg by mouth daily   aspirin 81 MG tablet Take 81 mg by mouth daily      atorvastatin (LIPITOR) 40 mg tablet Take 40 mg by mouth every evening      Bimatoprost (LUMIGAN OP) Apply 1 drop to eye daily at bedtime Both eyes       brimonidine (ALPHAGAN P) 0 15 % ophthalmic solution Administer 1 drop to the right eye daily 5 mL 0    cholecalciferol 1000 units tablet Take 1 tablet (1,000 Units total) by mouth daily 30 tablet 0    cyanocobalamin 1000 MCG tablet Take 1 tablet (1,000 mcg total) by mouth daily 30 tablet 0    FOLIC ACID PO 0 4 mg daily      furosemide (LASIX) 20 mg tablet Take 1 tablet (20 mg total) by mouth daily Do NOT take if systolic BP is under 096 (Patient taking differently: Take 10 mg by mouth daily Do NOT take if systolic BP is under 759) 30 tablet 0    glucose blood test strip CHECK BLOOD SUGAR ONCE OR TWICE DAILY  E11 9 Z79 4      lisinopril (ZESTRIL) 10 mg tablet       magnesium oxide (MAG-OX) 400 mg Take 200 mg by mouth daily       metFORMIN (GLUCOPHAGE) 1000 MG tablet Take 500 mg by mouth 2 (two) times a day with meals       metoprolol succinate (TOPROL-XL) 100 mg 24 hr tablet Take 1 tablet (100 mg total) by mouth daily 30 tablet 0    Multiple Vitamin (MULTIVITAMIN) tablet Take 1 tablet by mouth daily        polyethylene glycol (MIRALAX) 17 g packet Take 17 g by mouth daily (Patient taking differently: Take 17 g by mouth as needed ) 14 each 0    potassium chloride (K-DUR,KLOR-CON) 20 mEq tablet Take 400 mEq by mouth daily       rivaroxaban (XARELTO) 20 mg tablet Take 1 tablet (20 mg total) by mouth daily with dinner 30 tablet 0    tamsulosin (FLOMAX) 0 4 mg Take 1 capsule (0 4 mg total) by mouth daily with dinner 30 capsule 0    [DISCONTINUED] carbidopa-levodopa (SINEMET)  mg per tablet Take 1 5 tablets by mouth 3 (three) times a day before meals 405 tablet 3    lisinopril (ZESTRIL) 5 mg tablet Take 5 mg by mouth daily      senna-docusate sodium (SENOKOT S) 8 6-50 mg per tablet Take 2 tablets by mouth 2 (two) times a day as needed for constipation (Patient not taking: Reported on 3/11/2019) 100 tablet 0    tamsulosin (FLOMAX) 0 4 mg TAKE 1 CAPSULE TWICE A DAY (Patient not taking: Reported on 12/23/2019) 180 capsule 1     No current facility-administered medications on file prior to visit

## 2020-01-06 ENCOUNTER — TELEPHONE (OUTPATIENT)
Dept: NEUROLOGY | Facility: CLINIC | Age: 70
End: 2020-01-06

## 2020-01-08 ENCOUNTER — TELEPHONE (OUTPATIENT)
Dept: NEUROLOGY | Facility: CLINIC | Age: 70
End: 2020-01-08

## 2020-01-08 NOTE — TELEPHONE ENCOUNTER
Contacted patient to see if I could assist in scheduling his MRI brain w/o contrast ordered by Dr Tunde Leija since we were unable to do so on 12/23/2019  Patient stated that he did obtain the additional information needed in order to schedule the MRI but at the moment, he did not have it with him  Informed him that he could give me a call back at the office and I can assist with scheduling if he needed my help  Patient stated that he has been in contact with Central Scheduling in regards to scheduling the MRI and he stated that he would give a call back if he has any trouble scheduling the MRI

## 2020-01-08 NOTE — TELEPHONE ENCOUNTER
Patient stated he received information from his pacemaker information from his cardiologist's office  Patient was told that he should contact Dr Tunde Leija and make sure he can get an MRI with a pacemaker  Please assist in scheduling patient for MRI per 12/23/19 encounter

## 2020-01-10 NOTE — TELEPHONE ENCOUNTER
Reviewing chart: Patient shows that he is currently on the waitlist to be contact back for an MRI appointment  Added onto waitlist by Central Scheduling with the info: "[address] 233 pacemaker model# [id number] serial# [id number] implanted 9/16/19 [facility]" on 1/8/2020  Will follow up to make sure that his MRI is schedule

## 2020-01-13 ENCOUNTER — TELEPHONE (OUTPATIENT)
Dept: NEUROLOGY | Facility: CLINIC | Age: 70
End: 2020-01-13

## 2020-01-13 NOTE — TELEPHONE ENCOUNTER
pt called and states that pacemaker is MRI compatible  i conference called to central scheduling  she took all of the info that she needed    she states that they will reach out to [facility] Expert TA and then   reach out to pt in 1-2 weeks to schedule

## 2020-01-13 NOTE — TELEPHONE ENCOUNTER
Patient questioning if he is supposed to follow with an eye doctor  Per Dr Xavi Hutton office note, he was to see neuro ophthalmologist    This was included on the patient's AVS    Mailed the AVS to the patient

## 2020-01-20 ENCOUNTER — TELEPHONE (OUTPATIENT)
Dept: NEUROLOGY | Facility: CLINIC | Age: 70
End: 2020-01-20

## 2020-01-20 NOTE — TELEPHONE ENCOUNTER
MRI appointment is scheduled for 2/25/2020 12PM at [facility]  Informed patient today when patient arrived at the [facility] office when he confused his Cardiology appt scheduled on 1/21/2020 with Neurology

## 2020-01-20 NOTE — TELEPHONE ENCOUNTER
Patient stopped by the office because he thought that his appointment was scheduled for today with Dr Joselo Natarajan  I informed him that his next appointment date is 3/30/2020 4PM at [facility]  Appointment card given to patient  Patient stated that he received a text message stating that his appointment was today  He showed me his phone and the messaged stated it was 12/23/2019 12/23/2019 appointment with Dr Joselo Natarajan was already completed at [facility]  AND underneath was the message received on Saturday for his appointment at [facility] AT THE [facility] Cardiology with Dr John Jones on Tuesday, 1/21/2020 1:30PM  Informed him of the appointment

## 2020-02-13 ENCOUNTER — HOSPITAL ENCOUNTER (INPATIENT)
Facility: HOSPITAL | Age: 70
LOS: 6 days | Discharge: HOME HEALTH | DRG: 123 | End: 2020-02-19
Attending: EMERGENCY MEDICINE | Admitting: INTERNAL MEDICINE
Payer: MEDICARE

## 2020-02-13 ENCOUNTER — TELEPHONE (OUTPATIENT)
Dept: NEUROLOGY | Facility: CLINIC | Age: 70
End: 2020-02-13

## 2020-02-13 DIAGNOSIS — H53.2 DOUBLE VISION: Primary | ICD-10-CM

## 2020-02-13 DIAGNOSIS — I42.2 HYPERTROPHIC CARDIOMYOPATHY (HCC): ICD-10-CM

## 2020-02-13 DIAGNOSIS — H53.2 DIPLOPIA: ICD-10-CM

## 2020-02-13 DIAGNOSIS — I10 ESSENTIAL HYPERTENSION: ICD-10-CM

## 2020-02-13 DIAGNOSIS — H53.9 VISUAL CHANGES: Primary | ICD-10-CM

## 2020-02-13 DIAGNOSIS — I50.32 CHRONIC DIASTOLIC CHF (CONGESTIVE HEART FAILURE) (HCC): ICD-10-CM

## 2020-02-13 DIAGNOSIS — G20 PARKINSON DISEASE (HCC): ICD-10-CM

## 2020-02-13 DIAGNOSIS — G20 PARKINSON'S DISEASE (HCC): ICD-10-CM

## 2020-02-13 DIAGNOSIS — H57.9 EYE EXAM ABNORMAL: ICD-10-CM

## 2020-02-13 LAB
ALBUMIN SERPL BCP-MCNC: 3.7 G/DL (ref 3.5–5)
ALP SERPL-CCNC: 133 U/L (ref 46–116)
ALT SERPL W P-5'-P-CCNC: 17 U/L (ref 12–78)
ANION GAP SERPL CALCULATED.3IONS-SCNC: 8 MMOL/L (ref 4–13)
AST SERPL W P-5'-P-CCNC: 8 U/L (ref 5–45)
BASOPHILS # BLD AUTO: 0.02 THOUSANDS/ΜL (ref 0–0.1)
BASOPHILS NFR BLD AUTO: 0 % (ref 0–1)
BILIRUB SERPL-MCNC: 0.44 MG/DL (ref 0.2–1)
BUN SERPL-MCNC: 12 MG/DL (ref 5–25)
CALCIUM SERPL-MCNC: 9.1 MG/DL (ref 8.3–10.1)
CHLORIDE SERPL-SCNC: 104 MMOL/L (ref 100–108)
CO2 SERPL-SCNC: 28 MMOL/L (ref 21–32)
CREAT SERPL-MCNC: 0.75 MG/DL (ref 0.6–1.3)
EOSINOPHIL # BLD AUTO: 0.1 THOUSAND/ΜL (ref 0–0.61)
EOSINOPHIL NFR BLD AUTO: 2 % (ref 0–6)
ERYTHROCYTE [DISTWIDTH] IN BLOOD BY AUTOMATED COUNT: 13.3 % (ref 11.6–15.1)
GFR SERPL CREATININE-BSD FRML MDRD: 92 ML/MIN/1.73SQ M
GLUCOSE SERPL-MCNC: 148 MG/DL (ref 65–140)
GLUCOSE SERPL-MCNC: 230 MG/DL (ref 65–140)
HCT VFR BLD AUTO: 41.9 % (ref 36.5–49.3)
HGB BLD-MCNC: 13.2 G/DL (ref 12–17)
IMM GRANULOCYTES # BLD AUTO: 0.03 THOUSAND/UL (ref 0–0.2)
IMM GRANULOCYTES NFR BLD AUTO: 1 % (ref 0–2)
LYMPHOCYTES # BLD AUTO: 1.85 THOUSANDS/ΜL (ref 0.6–4.47)
LYMPHOCYTES NFR BLD AUTO: 30 % (ref 14–44)
MCH RBC QN AUTO: 29.5 PG (ref 26.8–34.3)
MCHC RBC AUTO-ENTMCNC: 31.5 G/DL (ref 31.4–37.4)
MCV RBC AUTO: 94 FL (ref 82–98)
MONOCYTES # BLD AUTO: 0.58 THOUSAND/ΜL (ref 0.17–1.22)
MONOCYTES NFR BLD AUTO: 9 % (ref 4–12)
NEUTROPHILS # BLD AUTO: 3.56 THOUSANDS/ΜL (ref 1.85–7.62)
NEUTS SEG NFR BLD AUTO: 58 % (ref 43–75)
NRBC BLD AUTO-RTO: 0 /100 WBCS
PLATELET # BLD AUTO: 238 THOUSANDS/UL (ref 149–390)
PMV BLD AUTO: 9.8 FL (ref 8.9–12.7)
POTASSIUM SERPL-SCNC: 3.5 MMOL/L (ref 3.5–5.3)
PROT SERPL-MCNC: 6.9 G/DL (ref 6.4–8.2)
RBC # BLD AUTO: 4.48 MILLION/UL (ref 3.88–5.62)
SODIUM SERPL-SCNC: 140 MMOL/L (ref 136–145)
WBC # BLD AUTO: 6.14 THOUSAND/UL (ref 4.31–10.16)

## 2020-02-13 PROCEDURE — NC001 PR NO CHARGE: Performed by: PSYCHIATRY & NEUROLOGY

## 2020-02-13 PROCEDURE — 99284 EMERGENCY DEPT VISIT MOD MDM: CPT

## 2020-02-13 PROCEDURE — 80053 COMPREHEN METABOLIC PANEL: CPT | Performed by: EMERGENCY MEDICINE

## 2020-02-13 PROCEDURE — 36415 COLL VENOUS BLD VENIPUNCTURE: CPT | Performed by: EMERGENCY MEDICINE

## 2020-02-13 PROCEDURE — 82948 REAGENT STRIP/BLOOD GLUCOSE: CPT

## 2020-02-13 PROCEDURE — 1123F ACP DISCUSS/DSCN MKR DOCD: CPT | Performed by: PSYCHIATRY & NEUROLOGY

## 2020-02-13 PROCEDURE — 85025 COMPLETE CBC W/AUTO DIFF WBC: CPT | Performed by: EMERGENCY MEDICINE

## 2020-02-13 PROCEDURE — 99223 1ST HOSP IP/OBS HIGH 75: CPT | Performed by: INTERNAL MEDICINE

## 2020-02-13 RX ORDER — LISINOPRIL 10 MG/1
10 TABLET ORAL DAILY
Status: DISCONTINUED | OUTPATIENT
Start: 2020-02-14 | End: 2020-02-19 | Stop reason: HOSPADM

## 2020-02-13 RX ORDER — POLYETHYLENE GLYCOL 3350 17 G/17G
17 POWDER, FOR SOLUTION ORAL DAILY PRN
Status: DISCONTINUED | OUTPATIENT
Start: 2020-02-13 | End: 2020-02-19 | Stop reason: HOSPADM

## 2020-02-13 RX ORDER — TAMSULOSIN HYDROCHLORIDE 0.4 MG/1
0.4 CAPSULE ORAL 2 TIMES DAILY
Status: DISCONTINUED | OUTPATIENT
Start: 2020-02-13 | End: 2020-02-19 | Stop reason: HOSPADM

## 2020-02-13 RX ORDER — FUROSEMIDE 20 MG/1
10 TABLET ORAL DAILY
Status: DISCONTINUED | OUTPATIENT
Start: 2020-02-14 | End: 2020-02-19 | Stop reason: HOSPADM

## 2020-02-13 RX ORDER — ASPIRIN 81 MG/1
81 TABLET ORAL DAILY
Status: DISCONTINUED | OUTPATIENT
Start: 2020-02-14 | End: 2020-02-14

## 2020-02-13 RX ORDER — ACETAMINOPHEN 325 MG/1
650 TABLET ORAL ONCE AS NEEDED
Status: COMPLETED | OUTPATIENT
Start: 2020-02-13 | End: 2020-02-14

## 2020-02-13 RX ORDER — METOPROLOL SUCCINATE 50 MG/1
100 TABLET, EXTENDED RELEASE ORAL DAILY
Status: DISCONTINUED | OUTPATIENT
Start: 2020-02-14 | End: 2020-02-19 | Stop reason: HOSPADM

## 2020-02-13 RX ORDER — ALLOPURINOL 100 MG/1
300 TABLET ORAL DAILY
Status: DISCONTINUED | OUTPATIENT
Start: 2020-02-14 | End: 2020-02-19 | Stop reason: HOSPADM

## 2020-02-13 RX ORDER — AMOXICILLIN 250 MG
2 CAPSULE ORAL 2 TIMES DAILY PRN
Status: DISCONTINUED | OUTPATIENT
Start: 2020-02-13 | End: 2020-02-19 | Stop reason: HOSPADM

## 2020-02-13 RX ORDER — POTASSIUM CHLORIDE 20 MEQ/1
20 TABLET, EXTENDED RELEASE ORAL DAILY
Status: DISCONTINUED | OUTPATIENT
Start: 2020-02-14 | End: 2020-02-19 | Stop reason: HOSPADM

## 2020-02-13 RX ORDER — ATORVASTATIN CALCIUM 40 MG/1
40 TABLET, FILM COATED ORAL EVERY EVENING
Status: DISCONTINUED | OUTPATIENT
Start: 2020-02-13 | End: 2020-02-19 | Stop reason: HOSPADM

## 2020-02-13 RX ADMIN — BIMATOPROST 1 DROP: 0.1 SOLUTION/ DROPS OPHTHALMIC at 22:57

## 2020-02-13 RX ADMIN — TAMSULOSIN HYDROCHLORIDE 0.4 MG: 0.4 CAPSULE ORAL at 22:55

## 2020-02-13 RX ADMIN — ATORVASTATIN CALCIUM 40 MG: 40 TABLET, FILM COATED ORAL at 22:55

## 2020-02-13 NOTE — TELEPHONE ENCOUNTER
Called and spoke with Naun Simpson and his wife  They are going to go to [address] today      Dr Denis Swann, [id number] [address]

## 2020-02-13 NOTE — TELEPHONE ENCOUNTER
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

## 2020-02-13 NOTE — TELEPHONE ENCOUNTER
Called and advised pt and pt's wife Pavel De Los Santos of the below  Both verbalized clear understanding of all instructions  At this time, both declined ER  States that every time they go to the ER, they have to wait >5 hours and "they don't do anything"  Asking for other recommendation?

## 2020-02-13 NOTE — TELEPHONE ENCOUNTER
Patient family calling again regarding patient  They are questioning if it is important that patient go to the ED today  They are questioning specific wait times for each ED and if it is really important that the patient go to the ED today, or "can it wait until tomorrow or the next day?"     Patient daughter then asking what will be done at ED, and asking why patient needs to go to ED  Advised that we received call from Dr Elliot Gallo office advising of patient's acute change in mental status over last two weeks  She states that it has been longer than that  Advised of everything in previous encounters  She continued to ask why they should go to ED  I advised that if patient goes to ED, they can have testing done that may be required when medically necessary more quickly than scheduling outpatient  They are still not convinced that patient should go to ED at this time and are questioning if this is absolutely necessary  I'm not sure what else to tell them  Please advise      # [phone]

## 2020-02-13 NOTE — TELEPHONE ENCOUNTER
I would strongly recommend it  He needs to have this finding from his eye doctor workup up now  They will be able to get his MRI done more quickly (which should be with and without contrast)  He may be admitted  We could try to do the same as an out patient if they refuse but it will delay the workup considerably

## 2020-02-14 LAB
ALBUMIN SERPL BCP-MCNC: 3.6 G/DL (ref 3.5–5)
ALP SERPL-CCNC: 105 U/L (ref 46–116)
ALT SERPL W P-5'-P-CCNC: 15 U/L (ref 12–78)
ANION GAP SERPL CALCULATED.3IONS-SCNC: 10 MMOL/L (ref 4–13)
AST SERPL W P-5'-P-CCNC: 9 U/L (ref 5–45)
BASOPHILS # BLD AUTO: 0.02 THOUSANDS/ΜL (ref 0–0.1)
BASOPHILS NFR BLD AUTO: 0 % (ref 0–1)
BILIRUB SERPL-MCNC: 0.85 MG/DL (ref 0.2–1)
BUN SERPL-MCNC: 12 MG/DL (ref 5–25)
CALCIUM SERPL-MCNC: 9.1 MG/DL (ref 8.3–10.1)
CHLORIDE SERPL-SCNC: 104 MMOL/L (ref 100–108)
CO2 SERPL-SCNC: 28 MMOL/L (ref 21–32)
CREAT SERPL-MCNC: 0.66 MG/DL (ref 0.6–1.3)
EOSINOPHIL # BLD AUTO: 0.09 THOUSAND/ΜL (ref 0–0.61)
EOSINOPHIL NFR BLD AUTO: 1 % (ref 0–6)
ERYTHROCYTE [DISTWIDTH] IN BLOOD BY AUTOMATED COUNT: 13.2 % (ref 11.6–15.1)
GFR SERPL CREATININE-BSD FRML MDRD: 97 ML/MIN/1.73SQ M
GLUCOSE SERPL-MCNC: 120 MG/DL (ref 65–140)
GLUCOSE SERPL-MCNC: 121 MG/DL (ref 65–140)
GLUCOSE SERPL-MCNC: 136 MG/DL (ref 65–140)
GLUCOSE SERPL-MCNC: 145 MG/DL (ref 65–140)
GLUCOSE SERPL-MCNC: 171 MG/DL (ref 65–140)
HCT VFR BLD AUTO: 41 % (ref 36.5–49.3)
HGB BLD-MCNC: 12.9 G/DL (ref 12–17)
IMM GRANULOCYTES # BLD AUTO: 0.03 THOUSAND/UL (ref 0–0.2)
IMM GRANULOCYTES NFR BLD AUTO: 1 % (ref 0–2)
LYMPHOCYTES # BLD AUTO: 2.17 THOUSANDS/ΜL (ref 0.6–4.47)
LYMPHOCYTES NFR BLD AUTO: 34 % (ref 14–44)
MAGNESIUM SERPL-MCNC: 1.7 MG/DL (ref 1.6–2.6)
MCH RBC QN AUTO: 29.5 PG (ref 26.8–34.3)
MCHC RBC AUTO-ENTMCNC: 31.5 G/DL (ref 31.4–37.4)
MCV RBC AUTO: 94 FL (ref 82–98)
MONOCYTES # BLD AUTO: 0.6 THOUSAND/ΜL (ref 0.17–1.22)
MONOCYTES NFR BLD AUTO: 10 % (ref 4–12)
NEUTROPHILS # BLD AUTO: 3.41 THOUSANDS/ΜL (ref 1.85–7.62)
NEUTS SEG NFR BLD AUTO: 54 % (ref 43–75)
NRBC BLD AUTO-RTO: 0 /100 WBCS
PHOSPHATE SERPL-MCNC: 4.3 MG/DL (ref 2.3–4.1)
PLATELET # BLD AUTO: 228 THOUSANDS/UL (ref 149–390)
PMV BLD AUTO: 10 FL (ref 8.9–12.7)
POTASSIUM SERPL-SCNC: 3.7 MMOL/L (ref 3.5–5.3)
PROT SERPL-MCNC: 6.5 G/DL (ref 6.4–8.2)
RBC # BLD AUTO: 4.37 MILLION/UL (ref 3.88–5.62)
SODIUM SERPL-SCNC: 142 MMOL/L (ref 136–145)
WBC # BLD AUTO: 6.32 THOUSAND/UL (ref 4.31–10.16)

## 2020-02-14 PROCEDURE — 82948 REAGENT STRIP/BLOOD GLUCOSE: CPT

## 2020-02-14 PROCEDURE — 99283 EMERGENCY DEPT VISIT LOW MDM: CPT | Performed by: EMERGENCY MEDICINE

## 2020-02-14 PROCEDURE — 83735 ASSAY OF MAGNESIUM: CPT | Performed by: INTERNAL MEDICINE

## 2020-02-14 PROCEDURE — 97163 PT EVAL HIGH COMPLEX 45 MIN: CPT

## 2020-02-14 PROCEDURE — 80053 COMPREHEN METABOLIC PANEL: CPT | Performed by: INTERNAL MEDICINE

## 2020-02-14 PROCEDURE — 99232 SBSQ HOSP IP/OBS MODERATE 35: CPT | Performed by: PHYSICIAN ASSISTANT

## 2020-02-14 PROCEDURE — 99223 1ST HOSP IP/OBS HIGH 75: CPT | Performed by: PSYCHIATRY & NEUROLOGY

## 2020-02-14 PROCEDURE — 85025 COMPLETE CBC W/AUTO DIFF WBC: CPT | Performed by: INTERNAL MEDICINE

## 2020-02-14 PROCEDURE — 84100 ASSAY OF PHOSPHORUS: CPT | Performed by: INTERNAL MEDICINE

## 2020-02-14 PROCEDURE — 97167 OT EVAL HIGH COMPLEX 60 MIN: CPT

## 2020-02-14 RX ORDER — ACETAMINOPHEN 325 MG/1
650 TABLET ORAL EVERY 6 HOURS PRN
Status: COMPLETED | OUTPATIENT
Start: 2020-02-14 | End: 2020-02-14

## 2020-02-14 RX ORDER — ASPIRIN 81 MG/1
81 TABLET ORAL DAILY
Status: DISCONTINUED | OUTPATIENT
Start: 2020-02-15 | End: 2020-02-14

## 2020-02-14 RX ADMIN — BIMATOPROST 1 DROP: 0.1 SOLUTION/ DROPS OPHTHALMIC at 21:44

## 2020-02-14 RX ADMIN — METOPROLOL SUCCINATE 100 MG: 50 TABLET, EXTENDED RELEASE ORAL at 08:07

## 2020-02-14 RX ADMIN — LISINOPRIL 10 MG: 10 TABLET ORAL at 08:06

## 2020-02-14 RX ADMIN — TAMSULOSIN HYDROCHLORIDE 0.4 MG: 0.4 CAPSULE ORAL at 16:53

## 2020-02-14 RX ADMIN — CARBIDOPA AND LEVODOPA 2 TABLET: 25; 100 TABLET ORAL at 16:53

## 2020-02-14 RX ADMIN — ACETAMINOPHEN 650 MG: 325 TABLET ORAL at 08:11

## 2020-02-14 RX ADMIN — MAGNESIUM GLUCONATE 500 MG ORAL TABLET 200 MG: 500 TABLET ORAL at 08:06

## 2020-02-14 RX ADMIN — TAMSULOSIN HYDROCHLORIDE 0.4 MG: 0.4 CAPSULE ORAL at 08:07

## 2020-02-14 RX ADMIN — CARBIDOPA AND LEVODOPA 2 TABLET: 25; 100 TABLET ORAL at 12:53

## 2020-02-14 RX ADMIN — ASPIRIN 81 MG: 81 TABLET, COATED ORAL at 08:07

## 2020-02-14 RX ADMIN — INSULIN LISPRO 1 UNITS: 100 INJECTION, SOLUTION INTRAVENOUS; SUBCUTANEOUS at 12:53

## 2020-02-14 RX ADMIN — ATORVASTATIN CALCIUM 40 MG: 40 TABLET, FILM COATED ORAL at 16:53

## 2020-02-14 RX ADMIN — ACETAMINOPHEN 650 MG: 325 TABLET ORAL at 23:42

## 2020-02-14 RX ADMIN — ALLOPURINOL 300 MG: 100 TABLET ORAL at 08:06

## 2020-02-14 RX ADMIN — CARBIDOPA AND LEVODOPA 2 TABLET: 25; 100 TABLET ORAL at 06:45

## 2020-02-14 RX ADMIN — POTASSIUM CHLORIDE 20 MEQ: 1500 TABLET, EXTENDED RELEASE ORAL at 08:07

## 2020-02-14 RX ADMIN — FUROSEMIDE 10 MG: 20 TABLET ORAL at 08:07

## 2020-02-14 NOTE — ASSESSMENT & PLAN NOTE
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

## 2020-02-14 NOTE — ASSESSMENT & PLAN NOTE
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

## 2020-02-14 NOTE — PHYSICAL THERAPY NOTE
PHYSICAL THERAPY EVALUATION          Patient Name: Boo Lanza  SFPWY'C Date: 2/14/2020   PT EVALUATION    65 y o     [id number]    Double vision [H53 2]  Parkinson disease ([facility] ) [G20]  Change in mental status [R41 82]  Eye exam abnormal [H57 9]    Past Medical History:   Diagnosis Date    A-fib ([facility] )     Arthritis     Balance problems     sometimes    CHF (congestive heart failure) ([facility])     CPAP (continuous positive airway pressure) dependence     Diabetes mellitus (HCC)     Diabetic neuropathy ([facility])     bilat feet    Glaucoma     bilat    Gout     Hard of hearing     doesn t use his hearing aids    Hiatal hernia     History of total right knee replacement     History of total shoulder replacement     left    Hyperlipidemia     Hypertension     Knee pain, left     Knee pain, right     Obesity     Sleep apnea     Use of cane as ambulatory aid     Wears glasses      Past Surgical History:   Procedure Laterality Date    CARDIAC CATHETERIZATION      x2    CARDIAC CATHETERIZATION      COLONOSCOPY      COLONOSCOPY N/A 7/15/2016    Procedure: COLONOSCOPY;  Surgeon: Shantel Ren MD;  Location: [facility];   Service:     EYE SURGERY      laser eye surgery    FOOT SURGERY Left     HIATAL HERNIA REPAIR      JOINT REPLACEMENT      left shoulder/right knee    GA REVISE KNEE JOINT REPLACE,1 PART Right 4/21/2017    Procedure: KNEE REVISION PARTIAL VERSUS COMPLETE REVISION;  Surgeon: Arianne Pereyra MD;  Location: [facility];  Service: Orthopedics    WISDOM TOOTH EXTRACTION          02/14/20 0924   Note Type   Note type Eval only   Pain Assessment   Pain Assessment No/denies pain   Pain Score No Pain   Home Living   Type of [address] [place] [address] Two level;1/2 bath on main level;Bed/bath upstairs;Stairs to enter without rails   Bathroom Shower/Tub Walk-in shower   Bathroom Toilet Raised   Bathroom Equipment Shower chair   Bathroom Accessibility Accessible  (pt does not use RW in bathroom, holds on to furniture)   Home Equipment Walker;Cane   Additional Comments pt reside in [facility], 2 "small" MORIAH w/o rails  FOS to second story bed/bath   Prior Function   Level of [place] Independent with ADLs and functional mobility   Lives With Spouse;Daughter;Family  (grandson)   Receives Help From Family;Home health  [facility])   ADL Assistance Independent   IADLs Independent  (splits w family)   Falls in the last 6 months 1 to 4  (per pt, "one big one" leading to hospital admission in Jan)   Vocational Retired   Comments pta pt reports being indep, amb w RW on main level of house, use of cane upstairs  use of RW or cane for community ambulation  -melany and relies on dtr for transport  reports between his wife, dtr and grandson usually someone is home so he is rarely alone- wife unable to provide physical A however dtr does not work and can provide physical A prn  has [facility] PT/OT x2/wk   Restrictions/Precautions   Weight Bearing Precautions Per Order No   Other Precautions Telemetry; Fall Risk;Visual impairment   General   Additional Pertinent History pt admitted 2/13/20 for visual changes  current complaining of double vision, difficulty focusing edison on objects up close   hx of PD   Family/Caregiver Present No   Cognition   Overall Cognitive Status WFL   Arousal/Participation Cooperative   Orientation Level Oriented X4   Memory Within functional limits   Following Commands Follows one step commands without difficulty   RUE Assessment   RUE Assessment   (refer to OT)   LUE Assessment   LUE Assessment   (refer to OT)   RLE Assessment   RLE Assessment WFL  (grossly 5/5 w exceptions)   Strength RLE   R Ankle Dorsiflexion 3-/5   R Hip Flexion 4+/5   LLE Assessment   LLE Assessment WFL  (grossly 5/5)   Strength LLE   L Hip Flexion 4+/5   L Ankle Dorsiflexion 3-/5  (noted L ankle deformity)   Coordination   Movements are Fluid and Coordinated 0   Coordination and Movement Description mild instability   Sensation X  (reports progressive bl n/t 2* to PN)   Light Touch   RLE Light Touch Grossly intact  (absent L5)   LLE Light Touch Grossly intact   Transfers   Sit to Stand 4  Minimal assistance   Additional items Assist x 1; Armrests; Increased time required;Verbal cues   Stand to Sit 4  Minimal assistance   Additional items Assist x 1; Armrests; Increased time required;Verbal cues; Other  (RW)   Stand pivot 4  Minimal assistance   Additional items Assist x 1; Increased time required;Verbal cues; Other  (RW)   Toilet transfer 4  Minimal assistance   Additional items Assist x 1; Increased time required;Standard toilet;Verbal cues; Other  (increased reliance on grab bars)   Additional Comments poorly controlled descent during stand>sit however good use of armrests, RW for transf   Ambulation/Elevation   Gait pattern Forward Flexion; Wide MARVIN; Decreased foot clearance; Short stride; Excessively slow;R Foot drag;L Foot drag  (occ foot drag/decreased foot clearance)   Gait Assistance 4  Minimal assist   Additional items Assist x 1;Verbal cues   Assistive Device Rolling walker   Distance >200'   Stair Management Assistance Not tested   Balance   Static Sitting Good   Dynamic Sitting Fair +   Static Standing Fair   Dynamic Standing Poor +  (CGA for safety)   Ambulatory Poor +  (CGA w RW)   Endurance Deficit   Endurance Deficit No   Activity Tolerance   Activity Tolerance Patient tolerated treatment well; Other (Comment)  (visual deficits)   Medical Staff Made Aware Lali LITTLE   Nurse Made Aware Boni ZAVALA   Assessment   Prognosis Fair   Problem List Decreased strength;Decreased range of motion; Impaired balance;Decreased mobility; Decreased coordination; Impaired vision   Assessment Jarod Jacob is a 65 y o  male admitted to [facility] on 2/13/2020 for Visual changes   Pt  has a past medical history of A-fib ([address] ), Arthritis, Balance problems, CHF (congestive heart failure) ([facility] ), CPAP (continuous positive airway pressure) dependence, Diabetes mellitus ([facility] ), Diabetic neuropathy ([facility] ), Glaucoma, Gout, History of total right knee replacement, History of total shoulder replacement, Hyperlipidemia, Hypertension, Obesity  PT was consulted and pt was seen on 2/14/2020 for mobility assessment and d/c planning  Pt presents high fall risk precautions, PIV  Pt w prev hospital admission to [facility] from 1/21-23/20 for a fall, w d/c home and HHPT  Pt continues to receive home PT/OT x2/wk  Pt lives with his wife, dtr and grandson in a two story home with 2 MORIAH and FOS to second story bed and bath  Prior to admission pt reports being indep w ADLs, splits IADLs w family, - and relies on dtr for transport  Reports between his family members he is usually never alone, dtr can provide physical A at time of d/c if needed  Currently uses RW on main level of the home, use of cane on second story, mixed use of RW or cane for community ambulation  Pt is currently functioning at minimum assistance x1 level for transfers, minimum assistance x1 level for ambulation with Rolling Walker  Pt demonstrated mild instability w mobility, increased reliance on RW or environment for transf and amb  Noted limited DF ROM during exam contribution to occ foot drag/decreased foot clearance during amb  Pt notes continued issues w vision including double vision and difficulty focusing on objects near to him, however denies vision impacting mobility and denies concerns w d/c home including stair negotiation  Pt will benefit from continued skilled IP PT to address the above mentioned impairments  in order to maximize recovery and increase functional independence when completing mobility and ADLs  At this time PT recommendations for d/c are home w family support and continuation of HHPT when medically stable      Barriers to Discharge Inaccessible home environment   Barriers to Discharge Comments MORIAH   Goals   Patient Goals to go home, figure out what is wrong with his vision   STG Expiration Date 02/24/20   Short Term Goal #1 1)  Pt will perform bed mobility with Yann demonstrating appropriate technique 100% of the time in order to improve function  2)  Perform all transfers with supervision demonstrating safe and appropriate technique 100% of the time in order to improve ability to negotiate safely in home environment  3) Amb with least restrictive AD > 250'x1 with supervision in order to demonstrate ability to negotiate in home environment  4)  Improve overall strength and balance 1/2 grade in order to optimize ability to perform functional tasks and reduce fall risk  5) Increase activity tolerance to 45 minutes in order to improve endurance to functional tasks  6)  Negotiate stairs using most appropriate technique and S in order to be able to negotiate safely in home environment  7) PT for ongoing patient and family/caregiver education, DME needs and d/c planning in order to promote highest level of function in least restrictive environment  PT Treatment Day 0   Plan   Treatment/Interventions Functional transfer training;LE strengthening/ROM; Elevations; Therapeutic exercise;Patient/family training;Equipment eval/education; Bed mobility;Gait training;Continued evaluation;Spoke to nursing;OT   PT Frequency Other (Comment)  (3-4/wk)   Recommendation   Recommendation Home with family support;Home PT   PT - OK to Discharge Yes   Additional Comments when medically stable   Modified Buckhorn Scale   Modified Buckhorn Scale 4   Barthel Index   Feeding 10   Bathing 0   Grooming Score 5   Dressing Score 5   Bladder Score 10   Bowels Score 10   Toilet Use Score 5   Transfers (Bed/Chair) Score 10   Mobility (Level Surface) Score 10   Stairs Score 0  (not tested)   Barthel Index Score 65   History: co - morbidities, age, social background (sena, not usually alone), past experience (recent fall w hospital admission, d/c home w HHPT), fall risk, use of assistive device, cognition, multiple lines, visual changes impacting daily living/quality of life  Exam: impairments in systems including musculoskeletal (ROM, strength, posture), neuromuscular (balance, gait, transfers, motor function, sensation), joint integrity (noted ankle deformity on L), integumentary (skin integrity), cognition  Clinical: unstable/unpredictable  Complexity:high      Jeisonetta Lung, PT

## 2020-02-14 NOTE — PLAN OF CARE
Problem: Potential for Falls  Goal: Patient will remain free of falls  Description  INTERVENTIONS:  - Assess patient frequently for physical needs  -  Identify cognitive and physical deficits and behaviors that affect risk of falls    -  [place] fall precautions as indicated by assessment   - Educate patient/family on patient safety including physical limitations  - Instruct patient to call for assistance with activity based on assessment  - Modify environment to reduce risk of injury  - Consider OT/PT consult to assist with strengthening/mobility  Outcome: Progressing     Problem: METABOLIC, FLUID AND ELECTROLYTES - ADULT  Goal: Glucose maintained within target range  Description  INTERVENTIONS:  - Monitor Blood Glucose as ordered  - Assess for signs and symptoms of hyperglycemia and hypoglycemia  - Administer ordered medications to maintain glucose within target range  - Assess nutritional intake and initiate nutrition service referral as needed  Outcome: Progressing     Problem: MUSCULOSKELETAL - ADULT  Goal: Maintain or return mobility to safest level of function  Description  INTERVENTIONS:  - Assess patient's ability to carry out ADLs; assess patient's baseline for ADL function and identify physical deficits which impact ability to perform ADLs (bathing, care of mouth/teeth, toileting, grooming, dressing, etc )  - Assess/evaluate cause of self-care deficits   - Assess range of motion  - Assess patient's mobility  - Assess patient's need for assistive devices and provide as appropriate  - Encourage maximum independence but intervene and supervise when necessary  - Involve family in performance of ADLs  - Assess for home care needs following discharge   - Consider OT consult to assist with ADL evaluation and planning for discharge  - Provide patient education as appropriate  Outcome: Progressing     Problem: PAIN - ADULT  Goal: Verbalizes/displays adequate comfort level or baseline comfort level  Description  Interventions:  - Encourage patient to monitor pain and request assistance  - Assess pain using appropriate pain scale  - Administer analgesics based on type and severity of pain and evaluate response  - Implement non-pharmacological measures as appropriate and evaluate response  - Consider cultural and social influences on pain and pain management  - Notify physician/advanced practitioner if interventions unsuccessful or patient reports new pain  Outcome: Progressing     Problem: DISCHARGE PLANNING  Goal: Discharge to home or other facility with appropriate resources  Description  INTERVENTIONS:  - Identify barriers to discharge w/patient and caregiver  - Arrange for needed discharge resources and transportation as appropriate  - Identify discharge learning needs (meds, wound care, etc )  - Arrange for interpretive services to assist at discharge as needed  - Refer to Case Management Department for coordinating discharge planning if the patient needs post-hospital services based on physician/advanced practitioner order or complex needs related to functional status, cognitive ability, or social support system  Outcome: Progressing     Problem: Knowledge Deficit  Goal: Patient/family/caregiver demonstrates understanding of disease process, treatment plan, medications, and discharge instructions  Description  Complete learning assessment and assess knowledge base    Interventions:  - Provide teaching at level of understanding  - Provide teaching via preferred learning methods  Outcome: Progressing

## 2020-02-14 NOTE — PROGRESS NOTES
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  MRI pending   Spoke with Neurology this AM, holding aspirin and Xarelto as patient will need LP     Atrial fibrillation, chronic  Assessment & Plan  Rate controlled, continue metoprolol   Holding Xarelto for lumbar puncture    Chronic diastolic CHF (congestive heart failure) (HCC)  Assessment & Plan  Wt Readings from Last 3 Encounters:   02/14/20 (!) 138 kg (304 lb 3 8 oz)   12/23/19 (!) 137 kg (302 lb)   06/17/19 136 kg (299 lb)     Euvolemic, continue home medication  Continue to follow Cardiology as an outpatient        Diabetes mellitus type 2 with neurological manifestations [facility])  Assessment & Plan  Lab Results   Component Value Date    HGBA1C 5 9 09/28/2018       Recent Labs     02/13/20  2155 02/14/20  0744 02/14/20  1104   POCGLU 148* 121 171*       Blood Sugar Average: Last 72 hrs:  (P) 436 [id number]   hold metformin  Accuchecks, SSI       Hypertrophic cardiomyopathy ([facility] )  Assessment & Plan  Follows with [facility]iology   Appears euvolemic  Continue lasix regimen    Continue to follow with Cardiology as an outpatient  Parkinson's disease ([facility] )  Assessment & Plan  Continue Sinemet  PT/OT: home PT/OT    HTN (hypertension)  Assessment & Plan  BP stable  Maintained on lisinopril, toprol XL and lasix   Monitor VS       VTE Pharmacologic Prophylaxis:   Pharmacologic: Pharmacologic VTE Prophylaxis contraindicated due to holding Xarelto for lumbar puncture   Mechanical VTE Prophylaxis in Place: Yes    Patient Centered Rounds: I have performed bedside rounds with nursing staff today      Discussions with Specialists or Other Care Team Provider: Neurology     Education and Discussions with Family / Patient: patient, family at bedside spoke at length with daughter and wife at bedside and discussed plan of care    Time Spent for Care: 20 minutes  More than 50% of total time spent on counseling and coordination of care as described above  Current Length of Stay: 1 day(s)    Current Patient Status: Inpatient   Certification Statement: The patient will continue to require additional inpatient hospital stay due to visual distubrance workup     Discharge Plan: pending clinical course and workup     Code Status: Level 1 - Full Code      Subjective:   Doing well  No acute changes  No eye pain, no headache, lightheaded or dizziness  No numbness or tingling  No chest pain or SOB  Still with ongoing blurry and double vision unchanged per patient     Objective:     Vitals:   Temp (24hrs), Av 8 °F (36 6 °C), Min:97 6 °F (36 4 °C), Max:98 °F (36 7 °C)    Temp:  [97 6 °F (36 4 °C)-98 °F (36 7 °C)] 97 6 °F (36 4 °C)  HR:  [67-74] 74  Resp:  [16-20] 19  BP: (100-134)/(61-72) 100/61  SpO2:  [94 %-98 %] 97 %  Body mass index is 36 08 kg/m²  Input and Output Summary (last 24 hours):     No intake or output data in the 24 hours ending 20 1404    Physical Exam:     Physical Exam   Constitutional: No distress  HENT:   Head: Normocephalic  Eyes: Pupils are equal, round, and reactive to light  Conjunctivae are normal    Double vision, blurred vision    Neck: Neck supple  Cardiovascular: Normal rate and regular rhythm  Pulmonary/Chest: Effort normal and breath sounds normal    Abdominal: Soft  Bowel sounds are normal    Large abdominal circumference    Musculoskeletal: He exhibits edema  Neurological: He is alert  Skin: Skin is warm  Psychiatric: He has a normal mood and affect  Nursing note and vitals reviewed          Additional Data:     Labs:    Results from last 7 days   Lab Units 20  0502   WBC Thousand/uL 6 32   HEMOGLOBIN g/dL 12 9   HEMATOCRIT % 41 0   PLATELETS Thousands/uL 228   NEUTROS PCT % 54   LYMPHS PCT % 34   MONOS PCT % 10   EOS PCT % 1     Results from last 7 days   Lab Units 02/14/20  0502   SODIUM mmol/L 142   POTASSIUM mmol/L 3 7   CHLORIDE mmol/L 104   CO2 mmol/L 28   BUN mg/dL 12   CREATININE mg/dL 0 66   ANION GAP mmol/L 10   CALCIUM mg/dL 9 1   ALBUMIN g/dL 3 6   TOTAL BILIRUBIN mg/dL 0 85   ALK PHOS U/L 105   ALT U/L 15   AST U/L 9   GLUCOSE RANDOM mg/dL 120         Results from last 7 days   Lab Units 02/14/20  1104 02/14/20  0744 02/13/20  2155   POC GLUCOSE mg/dl 171* 121 148*                   * I Have Reviewed All Lab Data Listed Above  * Additional Pertinent Lab Tests Reviewed: All Labs Within Last 24 Hours Reviewed    Imaging:    Imaging Reports Reviewed Today Include:   Imaging Personally Reviewed by Myself Includes:  Pending MRI     Recent Cultures (last 7 days):           Last 24 Hours Medication List:     Current Facility-Administered Medications:  allopurinol 300 mg Oral Daily Chace Murry MD   atorvastatin 40 mg Oral QPM Chace Murry MD   bimatoprost 1 drop Both Eyes HS Chace Murry MD   carbidopa-levodopa 2 tablet Oral TID AC Chace Murry MD   furosemide 10 mg Oral Daily Chace Murry MD   insulin lispro 1-5 Units Subcutaneous 4x Daily (AC & HS) Chace Murry MD   lisinopril 10 mg Oral Daily Chace Murry MD   magnesium oxide 200 mg Oral Daily Chace Murry MD   metoprolol succinate 100 mg Oral Daily Chace Murry MD   polyethylene glycol 17 g Oral Daily PRN Chace Murry MD   potassium chloride 20 mEq Oral Daily Chace Murry MD   senna-docusate sodium 2 tablet Oral BID PRN Chace Murry MD   tamsulosin 0 4 mg Oral BID Chace Murry MD        Today, Patient Was Seen By: Panfilo Conklin PA-C    ** Please Note: Dictation voice to text software may have been used in the creation of this document   **

## 2020-02-14 NOTE — SOCIAL WORK
CM obtained all of the following info about the pt  HOME: Pt lives in a [facility]; 2 [address]   LIVES W/: Wife, dtr & g-son  :  Anneliese Romeo (Spouse), [phone] (H)  INDEPENDENT: Yes  DME: Bret Samuels, RW, raised toilet seat and shower chair  VNA: Yes, pt is currently open with [facility], confirmed through [facility]  DCI & [id number] will need to be faxed over to  at d/c  Pt should have SN for CHF, and he has been recommended to have home PT  AVS updated  Sticky note left for attending to order  No to STR, HHC, MH, D&A issues  PCP: Dr Nicolás Abdul: [facility] [address] [place] [address] Street: Retired  INSURANCE: [address]: Wife  TRANSPORTATION AT D/C: Wife & dtr     CM reviewed d/c planning process including the following: identifying help at home, patient preference for d/c planning needs, [facility] Meds to Bed program, availability of treatment team to discuss questions or concerns patient and/or family may have regarding understanding medications and recognizing signs and symptoms once discharged  CM also encouraged patient to follow up with all recommended appointments after discharge  Patient advised of importance for patient and family to participate in managing patients medical well being  Patient/caregiver received discharge checklist  Content reviewed  Patient/caregiver encouraged to participate in discharge plan of care prior to discharge home      DESTIN Lozada  2/14/2020  8311

## 2020-02-14 NOTE — ASSESSMENT & PLAN NOTE
Wt Readings from Last 3 Encounters:   02/14/20 (!) 138 kg (304 lb 3 8 oz)   12/23/19 (!) 137 kg (302 lb)   06/17/19 136 kg (299 lb)     Euvolemic, continue home medication    Continue to follow Cardiology as an outpatient

## 2020-02-14 NOTE — ED NOTES
Pt to be transported to [facility] 68 2 bed [facility]  89/65/92 3600

## 2020-02-14 NOTE — ASSESSMENT & PLAN NOTE
Lab Results   Component Value Date    HGBA1C 5 9 09/28/2018       Recent Labs     02/13/20  2155 02/14/20  0744 02/14/20  1104   POCGLU 148* 121 171*       Blood Sugar Average: Last 72 hrs:  (P) 957 [id number]   hold metformin    Accuchecks, SSI

## 2020-02-14 NOTE — H&P
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  MRI brain and Eye with & without contrast ordered  Neurology considering LP tomorrow  Parkinson's disease [facility])  Assessment & Plan  Continue home medications  Patient admits for frequent falls  PT OT eval and treat  Chronic diastolic CHF (congestive heart failure) (HCC)  Assessment & Plan  Wt Readings from Last 3 Encounters:   02/13/20 (!) 139 kg (306 lb 3 5 oz)   12/23/19 (!) 137 kg (302 lb)   06/17/19 136 kg (299 lb)     Euvolemic, continue home medication  Continue to follow Cardiology as an outpatient    Hypertrophic cardiomyopathy [facility])  Assessment & Plan  Euvolemic  Continue home medications  Continue to follow with Cardiology as an outpatient  Atrial fibrillation, chronic  Assessment & Plan  Rate controlled, continue metoprolol and Xarelto  Diabetes mellitus type 2 with neurological manifestations [facility])  Assessment & Plan  Lab Results   Component Value Date    HGBA1C 5 9 09/28/2018     No results for input(s): POCGLU in the last 72 hours  Blood Sugar Average: Last 72 hrs:   hold metformin  Check blood glucose 4 times daily was corrective insulin  HTN (hypertension)  Assessment & Plan  BP stable, continue home medications  VTE Prophylaxis: Rivaroxaban (Xarelto)  / sequential compression device   Code Status:  Full code  POLST: There is no POLST form on file for this patient (pre-hospital)    Anticipated Length of Stay:  Patient will be admitted on an Inpatient basis with an anticipated length of stay greater than 2 midnights     Justification for Hospital Stay:  Visual disturbance, 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    Total Time for Visit, including Counseling / Coordination of Care: 45 minutes  Greater than 50% of this total time spent on direct patient counseling and coordination of care  History of Present Illness:    Zahraa Worley is a 65 y o  male who presents with visual changes  Patient following neurology and he is ophthalmologist for a diplopia and visual changes  He visited his ophthalmologist this morning cane for 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 he was sent to the ER  Patient admitted in consultation with Neurology  Patient has no focal neurologic deficit  Denies cough, fever, chills, URI symptoms, headache, chest pain, shortness of breath, lower extremity swelling, orthopnea, PND, abdominal pain, nausea, vomiting, urinary or bowel changes  Review of Systems:    Review of Systems   Constitutional: Negative  HENT: Negative  Eyes: Positive for visual disturbance  Negative for photophobia, pain, discharge, redness and itching  Respiratory: Negative  Cardiovascular: Negative  Gastrointestinal: Negative  Endocrine: Negative  Genitourinary: Negative  Musculoskeletal: Negative  Skin: Negative  Neurological: Negative  Hematological: Negative  Psychiatric/Behavioral: Negative          Past Medical and Surgical History:     Past Medical History:   Diagnosis Date    [facility]fib [facility] )     Arthritis     Balance problems     sometimes    [facility]) [facility]     CPAP (continuous positive airway pressure) dependence     Diabetes mellitus ([facility]     Diabetic neuropathy ([facility])     bilat feet    Glaucoma     bilat    Gout     Hard of hearing     doesn t use his hearing aids    Hiatal hernia     History of total right knee replacement     History of total shoulder replacement     left    Hyperlipidemia     Hypertension     Knee pain, left     Knee pain, right     Obesity     Sleep apnea     Use of cane as ambulatory aid     Wears glasses        Past Surgical History:   Procedure Laterality Date    CARDIAC CATHETERIZATION      x2    CARDIAC CATHETERIZATION      COLONOSCOPY      COLONOSCOPY N/A 7/15/2016    Procedure: COLONOSCOPY;  Surgeon: Adri Patino MD;  Location: [facility]; Service:     EYE SURGERY      laser eye surgery    FOOT SURGERY Left     HIATAL HERNIA REPAIR      JOINT REPLACEMENT      left shoulder/right knee    OR REVISE KNEE JOINT REPLACE,1 PART Right 4/21/2017    Procedure: KNEE REVISION PARTIAL VERSUS COMPLETE REVISION;  Surgeon: Kamilah Molina MD;  Location: [facility];  Service: Orthopedics    WISDOM TOOTH EXTRACTION         Meds/Allergies:    Prior to Admission medications    Medication Sig Start Date End Date Taking? Authorizing Provider   allopurinol (ZYLOPRIM) 300 mg tablet Take 300 mg by mouth daily     Yes Historical Provider, MD   aspirin 81 MG tablet Take 81 mg by mouth daily   Yes Historical Provider, MD   atorvastatin (LIPITOR) 40 mg tablet Take 40 mg by mouth every evening   Yes Historical Provider, MD   Bimatoprost (LUMIGAN OP) Apply 1 drop to eye daily at bedtime Both eyes    Yes Historical Provider, MD   brimonidine (ALPHAGAN P) 0 15 % ophthalmic solution Administer 1 drop to the right eye daily 2/24/19  Yes Barbara De Leon MD   carbidopa-levodopa (SINEMET)  mg per tablet Take 2 tablets by mouth 3 (three) times a day before meals 12/23/19  Yes Sourav Tarango MD   cholecalciferol 1000 units tablet Take 1 tablet (1,000 Units total) by mouth daily 2/24/19  Yes Barbara De Leon MD   cyanocobalamin 1000 MCG tablet Take 1 tablet (1,000 mcg total) by mouth daily 2/24/19  Yes Barbara De Leon MD   FOLIC ACID PO 0 4 mg daily   Yes Historical Provider, MD   furosemide (LASIX) 20 mg tablet Take 1 tablet (20 mg total) by mouth daily Do NOT take if systolic BP is under 641  Patient taking differently: Take 10 mg by mouth daily Do NOT take if systolic BP is under 841 6/11/97  Yes Barbara De Leon MD   glucose blood test strip CHECK BLOOD SUGAR ONCE OR TWICE DAILY  E11 9 Z79 4   Yes Historical Provider, MD   lisinopril (ZESTRIL) 5 mg tablet Take 5 mg by mouth daily Dosage unknown   Yes Historical Provider, MD   magnesium oxide (MAG-OX) 400 mg Take 200 mg by mouth daily    Yes Historical Provider, MD   metFORMIN (GLUCOPHAGE) 1000 MG tablet Take 1,000 mg by mouth 2 (two) times a day with meals    Yes Historical Provider, MD   metoprolol succinate (TOPROL-XL) 100 mg 24 hr tablet Take 1 tablet (100 mg total) by mouth daily  Patient taking differently: Take by mouth 2 (two) times a day Dosage unknown to patient 11/10/18  Yes Barbara De Leon MD   Multiple Vitamin (MULTIVITAMIN) tablet Take 1 tablet by mouth daily  Yes Historical Provider, MD   polyethylene glycol (MIRALAX) 17 g packet Take 17 g by mouth daily  Patient taking differently: Take 17 g by mouth as needed  11/10/18  Yes Barbara De Leon MD   potassium chloride (K-DUR,KLOR-CON) 20 mEq tablet Take by mouth daily Dosage unknown to patient   Yes Historical Provider, MD   rivaroxaban (XARELTO) 20 mg tablet Take 1 tablet (20 mg total) by mouth daily with dinner 2/23/19  Yes Barbara De Leon MD   senna-docusate sodium (SENOKOT S) 8 6-50 mg per tablet Take 2 tablets by mouth 2 (two) times a day as needed for constipation 2/23/19  Yes Barbara De Leon MD   tamsulosin [facility]) 0 4 mg Take 1 capsule (0 4 mg total) by mouth daily with dinner  Patient taking differently: Take 0 4 mg by mouth 2 (two) times a day  11/9/18  Yes Barbara De Leon MD   lisinopril (ZESTRIL) 10 mg tablet  2/27/19   Historical Provider, MD   tamsulosin (FLOMAX) 0 4 mg TAKE 1 CAPSULE TWICE A DAY  Patient not taking: Reported on 12/23/2019 9/16/19   Agnes Ma MD     I have reviewed home medications with patient personally      Allergies: No Known Allergies    Social History:     Substance Use History:   Social History     Substance and Sexual Activity   Alcohol Use Yes    Comment: socially     Social History     Tobacco Use   Smoking Status Never Smoker   Smokeless Tobacco Never Used     Social History     Substance and Sexual Activity   Drug Use No       Family History:    non-contributory    Physical Exam:     Vitals:   Blood Pressure: 131/72 (02/13/20 2040)  Pulse: 70 (02/13/20 2040)  Temperature: 98 °F (36 7 °C) (02/13/20 1852)  Temp Source: Temporal (02/13/20 1852)  Respirations: 16 (02/13/20 2040)  Weight - Scale: (!) 139 kg (306 lb 3 5 oz) (02/13/20 1852)  SpO2: 95 % (02/13/20 2040)    Physical Exam    General appearance: alert, appears stated age and cooperative  Skin: Skin color, texture, turgor normal  No rashes or lesions  Head: Normocephalic, without obvious abnormality, atraumatic  Eyes: conjunctivae/corneas clear  PERRL, EOM's intact  Lungs: clear to auscultation bilaterally  Heart: regular rate and rhythm, S1, S2 normal, no murmur, click, rub or gallop  Abdomen: soft, non-tender; bowel sounds normal; no masses,  no organomegaly  Back: symmetric, no curvature  ROM normal  No CVA tenderness  Extremities: extremities normal, atraumatic, no cyanosis or edema  Neurologic: Alert and oriented X 3, normal strength and tone  Normal symmetric reflexes  Normal coordination and gait  Psychiatric:  Normal mood and affect    Additional Data:     Lab Results: I have personally reviewed pertinent reports        Results from last 7 days   Lab Units 02/13/20 1954   WBC Thousand/uL 6 14   HEMOGLOBIN g/dL 13 2   HEMATOCRIT % 41 9   PLATELETS Thousands/uL 238   NEUTROS PCT % 58   LYMPHS PCT % 30   MONOS PCT % 9   EOS PCT % 2     Results from last 7 days   Lab Units 02/13/20 1954   SODIUM mmol/L 140   POTASSIUM mmol/L 3 5   CHLORIDE mmol/L 104   CO2 mmol/L 28   BUN mg/dL 12   CREATININE mg/dL 0 75   ANION GAP mmol/L 8   CALCIUM mg/dL 9 1   ALBUMIN g/dL 3 7   TOTAL BILIRUBIN mg/dL 0 44   ALK PHOS U/L 133*   ALT U/L 17   AST U/L 8   GLUCOSE RANDOM mg/dL 230*                       Imaging: I have personally reviewed pertinent reports  No orders to display       EKG, Pathology, and Other Studies Reviewed on Admission: yes    Allscripts / Epic Records Reviewed: Yes     ** Please Note: This note has been constructed using a voice recognition system   **

## 2020-02-14 NOTE — ASSESSMENT & PLAN NOTE
Wt Readings from Last 3 Encounters:   02/13/20 (!) 139 kg (306 lb 3 5 oz)   12/23/19 (!) 137 kg (302 lb)   06/17/19 136 kg (299 lb)     Euvolemic, continue home medication    Continue to follow Cardiology as an outpatient

## 2020-02-14 NOTE — ED PROVIDER NOTES
History  Chief Complaint   Patient presents with    Medical Problem     Pt seen at opthamologist today  found inflammatory cells with concern for CNS lymphoma  advised by opthamologist and neurologist to come to ED for further testing/possible MRI  Per family pt has had changes in vision and change in mental status, such as being more forgetful  Pt CAOx4 at this time  pt states unable to see things very clearly, but that's been ongoing for a couple years     Pt is a 65year old male with a PMH of Parkinson's, type II DM, atrial fibrillation, CHF presenting with abnormal eye exam  Pt was seen at  ophthalmologist today who was concerned for CNS lymphoma based on his exam  He spoke to his neurologists who wanted the pt to come to the ED for evaluation  Pt has been having diplopia over the past weeks with worsening coordination and ambulation  Denies fevers, headache, dizziness, lightheadedness, chest pain, n/v/d  Prior to Admission Medications   Prescriptions Last Dose Informant Patient Reported? Taking? Bimatoprost (LUMIGAN OP) 2020 at Unknown time  Yes Yes   Sig: Apply 1 drop to eye daily at bedtime Both eyes    FOLIC ACID PO  at Unknown time  Yes Yes   Si 4 mg daily   Multiple Vitamin (MULTIVITAMIN) tablet 2020 at Unknown time  Yes Yes   Sig: Take 1 tablet by mouth daily  allopurinol (ZYLOPRIM) 300 mg tablet 2020 at Unknown time  Yes Yes   Sig: Take 300 mg by mouth daily     aspirin 81 MG tablet 2020 at Unknown time  Yes Yes   Sig: Take 81 mg by mouth daily   atorvastatin (LIPITOR) 40 mg tablet 2020 at Unknown time  Yes Yes   Sig: Take 40 mg by mouth every evening   brimonidine (ALPHAGAN P) 0 15 % ophthalmic solution 2020 at Unknown time  No Yes   Sig: Administer 1 drop to the right eye daily   carbidopa-levodopa (SINEMET)  mg per tablet 2020 at Unknown time  No Yes   Sig: Take 2 tablets by mouth 3 (three) times a day before meals cholecalciferol 1000 units tablet 2/13/2020 at Unknown time  No Yes   Sig: Take 1 tablet (1,000 Units total) by mouth daily   cyanocobalamin 1000 MCG tablet 2/13/2020 at Unknown time  No Yes   Sig: Take 1 tablet (1,000 mcg total) by mouth daily   furosemide (LASIX) 20 mg tablet 2/13/2020 at Unknown time  No Yes   Sig: Take 1 tablet (20 mg total) by mouth daily Do NOT take if systolic BP is under 751   Patient taking differently: Take 10 mg by mouth daily Do NOT take if systolic BP is under 623   glucose blood test strip   Yes Yes   Sig: CHECK BLOOD SUGAR ONCE OR TWICE DAILY   E11 9 Z79 4   lisinopril (ZESTRIL) 10 mg tablet Not Taking at Unknown time  Yes No   lisinopril (ZESTRIL) 5 mg tablet 2/12/2020 at Unknown time Self Yes Yes   Sig: Take 5 mg by mouth daily Dosage unknown   magnesium oxide (MAG-OX) 400 mg 2/13/2020 at Unknown time  Yes Yes   Sig: Take 200 mg by mouth daily    metFORMIN (GLUCOPHAGE) 1000 MG tablet 2/13/2020 at Unknown time Self Yes Yes   Sig: Take 1,000 mg by mouth 2 (two) times a day with meals    metoprolol succinate (TOPROL-XL) 100 mg 24 hr tablet 2/13/2020 at Unknown time  No Yes   Sig: Take 1 tablet (100 mg total) by mouth daily   Patient taking differently: Take by mouth 2 (two) times a day Dosage unknown to patient   polyethylene glycol (MIRALAX) 17 g packet  at prn Self No Yes   Sig: Take 17 g by mouth daily   Patient taking differently: Take 17 g by mouth as needed    potassium chloride (K-DUR,KLOR-CON) 20 mEq tablet 2/13/2020 at Unknown time  Yes Yes   Sig: Take by mouth daily Dosage unknown to patient   rivaroxaban (XARELTO) 20 mg tablet 2/12/2020 at Unknown time  No Yes   Sig: Take 1 tablet (20 mg total) by mouth daily with dinner   senna-docusate sodium (SENOKOT S) 8 6-50 mg per tablet  at prn  No Yes   Sig: Take 2 tablets by mouth 2 (two) times a day as needed for constipation   tamsulosin (FLOMAX) 0 4 mg 2/13/2020 at Unknown time  No Yes   Sig: Take 1 capsule (0 4 mg total) by mouth daily with dinner   Patient taking differently: Take 0 4 mg by mouth 2 (two) times a day    tamsulosin (FLOMAX) 0 4 mg Not Taking at Unknown time  No No   Sig: TAKE 1 CAPSULE TWICE A DAY   Patient not taking: Reported on 12/23/2019      Facility-Administered Medications: None       Past Medical History:   Diagnosis Date    A-fib ([place] [address] )     Arthritis     Balance problems     sometimes    CH[facility] failure) [facility])     CPAP (continuous positive airway pressure) dependence     Diabetes mellitus ([place] [address] )     Diabetic neuropathy ([facility])     bilat feet    Glaucoma     bilat    Gout     Hard of hearing     doesn t use his hearing aids    Hiatal hernia     History of total right knee replacement     History of total shoulder replacement     left    Hyperlipidemia     Hypertension     Knee pain, left     Knee pain, right     Obesity     Sleep apnea     Use of cane as ambulatory aid     Wears glasses        Past Surgical History:   Procedure Laterality Date    CARDIAC CATHETERIZATION      x2    CARDIAC CATHETERIZATION      COLONOSCOPY      COLONOSCOPY N/A 7/15/2016    Procedure: COLONOSCOPY;  Surgeon: Eliane Closs, MD;  Location: [facility]; Service:     EYE SURGERY      laser eye surgery    FOOT SURGERY Left     HIATAL HERNIA REPAIR      JOINT REPLACEMENT      left shoulder/right knee    [place] REVISE KNEE JOINT REPLACE,1 PART Right 4/21/2017    Procedure: KNEE REVISION PARTIAL VERSUS COMPLETE REVISION;  Surgeon: Mickie Santa MD;  Location: [facility];  Service: Orthopedics    WISDOM TOOTH EXTRACTION         Family History   Problem Relation Age of Onset    Heart disease Father     Cancer Mother      I have reviewed and agree with the history as documented      Social History     Tobacco Use    Smoking status: Never Smoker    Smokeless tobacco: Never Used   Substance Use Topics    Alcohol use: Yes     Frequency: 2-3 times a week     Drinks per session: 1 or 2     Binge frequency: Never     Comment: socially    Drug use: No       Review of Systems   Constitutional: Negative  HENT: Negative  Eyes: Positive for visual disturbance  Negative for photophobia, pain, discharge, redness and itching  Respiratory: Negative  Cardiovascular: Negative  Gastrointestinal: Negative  Genitourinary: Negative  Musculoskeletal: Negative  Neurological: Negative  All other systems reviewed and are negative  Physical Exam  Physical Exam   Constitutional: He is oriented to person, place, and time  He appears well-developed and well-nourished  No distress  HENT:   Head: Normocephalic and atraumatic  Right Ear: External ear normal    Left Ear: External ear normal    Nose: Nose normal    Mouth/Throat: Oropharynx is clear and moist  No oropharyngeal exudate  Eyes: Pupils are equal, round, and reactive to light  Conjunctivae and EOM are normal    Neck: Normal range of motion  Neck supple  Cardiovascular: Normal rate, regular rhythm, normal heart sounds and intact distal pulses  Pulmonary/Chest: Effort normal and breath sounds normal    Musculoskeletal: Normal range of motion  Neurological: He is alert and oriented to person, place, and time  He has normal strength  He displays tremor  No cranial nerve deficit or sensory deficit  He exhibits normal muscle tone  Coordination normal  GCS eye subscore is 4  GCS verbal subscore is 5  GCS motor subscore is 6  Non-focal neuro exam     Skin: Skin is warm and dry  He is not diaphoretic         Vital Signs  ED Triage Vitals [02/13/20 1852]   Temperature Pulse Respirations Blood Pressure SpO2   98 °F (36 7 °C) 70 20 134/62 98 %      Temp Source Heart Rate Source Patient Position - Orthostatic VS BP Location FiO2 (%)   Temporal Monitor Sitting Right arm --      Pain Score       No Pain           Vitals:    02/13/20 1852 02/13/20 1955 02/13/20 2040 02/13/20 2118   BP: 134/62 125/64 131/72 119/67   Pulse: 70 71 70 67   Patient Position - Orthostatic VS: Sitting Lying Sitting          Visual Acuity      ED Medications  Medications   acetaminophen (TYLENOL) tablet 650 mg (has no administration in time range)   allopurinol (ZYLOPRIM) tablet 300 mg (has no administration in time range)   aspirin (ECOTRIN LOW STRENGTH) EC tablet 81 mg (has no administration in time range)   atorvastatin (LIPITOR) tablet 40 mg (40 mg Oral Given 2/13/20 2255)   bimatoprost (LUMIGAN) 0 01 % ophthalmic solution 1 drop (1 drop Both Eyes Given 2/13/20 2257)   carbidopa-levodopa (SINEMET)  mg per tablet 2 tablet (has no administration in time range)   furosemide (LASIX) tablet 10 mg (has no administration in time range)   lisinopril (ZESTRIL) tablet 10 mg (has no administration in time range)   magnesium oxide (MAG-OX) tablet 200 mg (has no administration in time range)   metoprolol succinate (TOPROL-XL) 24 hr tablet 100 mg (has no administration in time range)   polyethylene glycol (MIRALAX) packet 17 g (has no administration in time range)   potassium chloride (K-DUR,KLOR-CON) CR tablet 20 mEq (has no administration in time range)   rivaroxaban (XARELTO) tablet 20 mg (has no administration in time range)   senna-docusate sodium (SENOKOT S) 8 6-50 mg per tablet 2 tablet (has no administration in time range)   tamsulosin (FLOMAX) capsule 0 4 mg (0 4 mg Oral Given 2/13/20 2255)   insulin lispro (HumaLOG) 100 units/mL subcutaneous injection 1-5 Units (1 Units Subcutaneous Not Given 2/13/20 2250)       Diagnostic Studies  Results Reviewed     Procedure Component Value Units Date/Time    Comprehensive metabolic panel [[id number]]  (Abnormal) Collected:  02/13/20 1954    Lab Status:  Final result Specimen:  Blood from Arm, Right Updated:  02/13/20 2025     Sodium 140 mmol/L      Potassium 3 5 mmol/L      Chloride 104 mmol/L      CO2 28 mmol/L      ANION GAP 8 mmol/L      BUN 12 mg/dL      Creatinine 0 75 mg/dL      Glucose 230 mg/dL      Calcium 9 1 mg/dL      AST 8 U/L ALT 17 U/L      Alkaline Phosphatase 133 U/L      Total Protein 6 9 g/dL      Albumin 3 7 g/dL      Total Bilirubin 0 44 mg/dL      eGFR 92 ml/min/1 73sq m     Narrative:       [place] guidelines for Chronic Kidney Disease (CKD):     Stage 1 with normal or high GFR (GFR > 90 mL/min/1 73 square meters)    Stage 2 Mild CKD (GFR = 60-89 mL/min/1 73 square meters)    Stage 3A Moderate CKD (GFR = 45-59 mL/min/1 73 square meters)    Stage 3B Moderate CKD (GFR = 30-44 mL/min/1 73 square meters)    Stage 4 Severe CKD (GFR = 15-29 mL/min/1 73 square meters)    Stage 5 End Stage CKD (GFR <15 mL/min/1 73 square meters)  Note: GFR calculation is accurate only with a steady state creatinine    CBC and differential [[id number]] Collected:  02/13/20 1954    Lab Status:  Final result Specimen:  Blood from Arm, Right Updated:  02/13/20 2003     WBC 6 14 Thousand/uL      RBC 4 48 Million/uL      Hemoglobin 13 2 g/dL      Hematocrit 41 9 %      MCV 94 fL      MCH 29 5 pg      MCHC 31 5 g/dL      RDW 13 3 %      MPV 9 8 fL      Platelets 267 Thousands/uL      nRBC 0 /100 WBCs      Neutrophils Relative 58 %      Immat GRANS % 1 %      Lymphocytes Relative 30 %      Monocytes Relative 9 %      Eosinophils Relative 2 %      Basophils Relative 0 %      Neutrophils Absolute 3 56 Thousands/µL      Immature Grans Absolute 0 03 Thousand/uL      Lymphocytes Absolute 1 85 Thousands/µL      Monocytes Absolute 0 58 Thousand/µL      Eosinophils Absolute 0 10 Thousand/µL      Basophils Absolute 0 02 Thousands/µL                  MRI inpatient order    (Results Pending)              Procedures  Procedures         ED Course  ED Course as of Feb 14 0336   Thu Feb 13, 2020 2009 Spoke to Dr Archie Mcdowell who is the pt neurologist  States the ophthalmologist had seen concerning findings and would like to rule out CNS lymphoma  Dr Archie Mcdowell would like the pt to be admitted for inpatient MRI and LP with neurology consult   Will speak to [facility] about admission  MDM      Disposition  Final diagnoses:   Double vision   Eye exam abnormal   Parkinson disease ([address] )     Time reflects when diagnosis was documented in both MDM as applicable and the Disposition within this note     Time User Action Codes Description Comment    2/13/2020  8:01 PM Saritaeugenia Sheikhlia Add [H53 2] Double vision     2/13/2020  8:01 PM Saritaeugenia Sheikhlia Add [H57 9] Eye exam abnormal     2/13/2020  8:01 PM Nicola, [facility] Parkinson disease [facility])       ED Disposition     ED Disposition Condition Date/Time Comment    Admit Stable u Feb 13, 2020  8:22 PM Case was discussed with ARTURO and the patient's admission status was agreed to be Admission Status: inpatient status to the service of Dr Grecia Schmid   Follow-up Information    None         Current Discharge Medication List      CONTINUE these medications which have NOT CHANGED    Details   allopurinol (ZYLOPRIM) 300 mg tablet Take 300 mg by mouth daily        aspirin 81 MG tablet Take 81 mg by mouth daily      atorvastatin (LIPITOR) 40 mg tablet Take 40 mg by mouth every evening      Bimatoprost (LUMIGAN OP) Apply 1 drop to eye daily at bedtime Both eyes       brimonidine (ALPHAGAN P) 0 15 % ophthalmic solution Administer 1 drop to the right eye daily  Qty: 5 mL, Refills: 0    Associated Diagnoses: Diplopia      carbidopa-levodopa (SINEMET)  mg per tablet Take 2 tablets by mouth 3 (three) times a day before meals  Qty: 540 tablet, Refills: 3    Associated Diagnoses: Parkinson's disease (HCC)      cholecalciferol 1000 units tablet Take 1 tablet (1,000 Units total) by mouth daily  Qty: 30 tablet, Refills: 0    Associated Diagnoses: Fall (on) (from) unspecified stairs and steps, sequela      cyanocobalamin 1000 MCG tablet Take 1 tablet (1,000 mcg total) by mouth daily  Qty: 30 tablet, Refills: 0    Associated Diagnoses: Abnormal gait      FOLIC ACID PO 0 4 mg daily      furosemide (LASIX) 20 mg tablet Take 1 tablet (20 mg total) by mouth daily Do NOT take if systolic BP is under 478  Qty: 30 tablet, Refills: 0    Associated Diagnoses: Hypertrophic cardiomyopathy (HCC)      glucose blood test strip CHECK BLOOD SUGAR ONCE OR TWICE DAILY  E11 9 Z79 4      !! lisinopril (ZESTRIL) 5 mg tablet Take 5 mg by mouth daily Dosage unknown      magnesium oxide (MAG-OX) 400 mg Take 200 mg by mouth daily       metFORMIN (GLUCOPHAGE) 1000 MG tablet Take 1,000 mg by mouth 2 (two) times a day with meals       metoprolol succinate (TOPROL-XL) 100 mg 24 hr tablet Take 1 tablet (100 mg total) by mouth daily  Qty: 30 tablet, Refills: 0    Associated Diagnoses: Atrial fibrillation, chronic      Multiple Vitamin (MULTIVITAMIN) tablet Take 1 tablet by mouth daily  polyethylene glycol (MIRALAX) 17 g packet Take 17 g by mouth daily  Qty: 14 each, Refills: 0    Associated Diagnoses: Parkinsonism ([address] )      potassium chloride (K-DUR,KLOR-CON) 20 mEq tablet Take by mouth daily Dosage unknown to patient      rivaroxaban (XARELTO) 20 mg tablet Take 1 tablet (20 mg total) by mouth daily with dinner  Qty: 30 tablet, Refills: 0    Associated Diagnoses: Atrial fibrillation, chronic      senna-docusate sodium (SENOKOT S) 8 6-50 mg per tablet Take 2 tablets by mouth 2 (two) times a day as needed for constipation  Qty: 100 tablet, Refills: 0    Associated Diagnoses: Constipated      !! tamsulosin (FLOMAX) 0 4 mg Take 1 capsule (0 4 mg total) by mouth daily with dinner  Qty: 30 capsule, Refills: 0    Associated Diagnoses: Prostate hypertrophy      !! lisinopril (ZESTRIL) 10 mg tablet       !! tamsulosin (FLOMAX) 0 4 mg TAKE 1 CAPSULE TWICE A DAY  Qty: 180 capsule, Refills: 1    Associated Diagnoses: Benign prostatic hyperplasia without lower urinary tract symptoms       ! ! - Potential duplicate medications found  Please discuss with provider  No discharge procedures on file      PDMP Review     None          ED Provider  Electronically Signed by           Keli Worthy PA-C  02/14/20 7234

## 2020-02-14 NOTE — ASSESSMENT & PLAN NOTE
Follows with [facility]   Appears euvolemic  Continue lasix regimen    Continue to follow with Cardiology as an outpatient

## 2020-02-14 NOTE — PROGRESS NOTES
Spoke to ED staff  Happy patient presented for admission   Ideally the patient will have an MRI brain and orbits with and without contrast  Likely will need LP as well for flow, cytology, beta2 glycoprotein etc

## 2020-02-14 NOTE — PLAN OF CARE
Problem: OCCUPATIONAL THERAPY ADULT  Goal: Performs self-care activities at highest level of function for planned discharge setting  See evaluation for individualized goals  Description  Treatment Interventions: ADL retraining, Functional transfer training, Visual perceptual retraining, Endurance training, UE strengthening/ROM, Patient/family training, Cognitive reorientation, Equipment evaluation/education, Compensatory technique education, Energy conservation, Activityengagement, Fine motor coordination activities          See flowsheet documentation for full assessment, interventions and recommendations  Note:   Limitation: Decreased ADL status, Decreased UE strength, Decreased Safe judgement during ADL, Decreased cognition, Decreased endurance, Decreased self-care trans, Decreased high-level ADLs, Decreased sensation, Visual deficit  Prognosis: Good  Assessment: Pt is a 65 y o  male seen for OT evaluation s/p admit to [facility] on 2/13/2020 w/ Visual changes  Comorbidities affecting pt's functional performance at time of assessment include: Parkinson's disease, CHF, a-fib, DM II, HTN  Personal factors affecting pt at time of IE include:frequent falls, per chart  Prior to admission, pt was living w/ spouse, daughter and grandson and reports independent w/ ADLs, independent w/ functional transfers and mobility w/ Rw or SPC or furniture walking in bathroom, independent w/ light IADLs, spouse assists, daughter provides transport   Upon evaluation: Pt requires MIN assist sit<>stand w/ VCs for hand placement and positioning and to keep RW w/ him at all times, MIN assist functional mobility w/ RW, MIN assist LB ADLs, setup UB ADLs, MIN assist toileting 2* the following deficits impacting occupational performance: visual deficits (reports difficulty focusing on items, double vision, unable to read text print on menu), impaired balance, decreased b/l UE coordination w/ bradykinesia, fall risk, impaired safety awareness, increased processing time  Pt to benefit from continued skilled OT tx while in the hospital to address deficits as defined above and maximize level of functional independence w ADL's and functional mobility  Occupational Performance areas to address include: grooming, bathing/shower, toilet hygiene, dressing, health maintenance, functional mobility, clothing management, cleaning and meal prep, formal cognitive assessment, visual assessments  From OT standpoint, recommendation at time of d/c would be home w/ HOME OT and increased family support/supervision  Pt would benefit from rehab however declining        OT Discharge Recommendation: Home OT(pt declines STR)  OT - OK to Discharge: (when medically stable)

## 2020-02-14 NOTE — ASSESSMENT & PLAN NOTE
Lab Results   Component Value Date    HGBA1C 5 9 09/28/2018       No results for input(s): POCGLU in the last 72 hours  Blood Sugar Average: Last 72 hrs:   hold metformin  Check blood glucose 4 times daily was corrective insulin

## 2020-02-14 NOTE — CONSULTS
Consultation - Neurology   Kati Rubio 65 y o  male MRN: [id number]  Unit/Bed#: [facility] 68 2 [facility] [id number] Encounter: [id number]      Assessment/Plan   Assessment:  Marline Sin is a 65 y o  male with Parkinson's, afib on Xarelto, and multiple vascular risk factors who presents with progressive binocular, horizontally stacked diplopia, worsening gait dysfunction, and worsening confusion for the past few months  Outpatient ophthalmology concerned for potential CNS lymphoma through their evaluation so patient was sent to the ED for further evaluation  Plan:  - MRI brain and orbits with and without contrast pending   - Will consult IR for lumbar puncture   · WBC, RBC, Glucose, Protein, Gram Stain, Meningitis/Encephalitis panel, Flow cytometry, cytology   · Aspirin 81 mg and Xarelto 20 mg on hold in anticipation of lumbar puncture  Will discontinue for now  Please restart after lumbar puncture  - Consider use of eye patch   - Medical management and supportive care per primary team  Correction of any metabolic or infectious disturbances  History of Present Illness     Reason for Consult / Principal Problem: Diplopia, mental status change, gait dysfunction     HPI: Marline Sin is a 65 y o  male with atrial fibrillation, hypertension, hyperlipidemia, diabetes mellitus with neuropathy, Parkinson's disease,  congestive heart failure, ERROL with CPAP requirements, glaucoma, and ambulatory dysfunction requiring use of cane who presented to the ED yesterday for evaluation of visual disturbances and abnormal ophthalmology exam   The patient has been reporting progressively worsening binocular diplopia that has been occurring for past year  He has been having difficulty ambulating and difficulty with depth perception due to his visual disturbances  He cannot accurately describe his vision changes, but does note that it is horizontally stacked, never vertically or obliquely stacked    He reports decreased visual acuity  He has difficulty reading at times  He notes that his vision improves if he covers 1 eye  He reports dry eye  Family has noted the patient to become more forgetful over the past 2 weeks, but it has been a progressive decline for about a year  He was seen at a [facility] ophthalmologist yesterday who noted abnormal findings, with concern for CNS lymphoma  Per patient, they noted "debris" that required further workup  The patient had initially seen ophthalmology for this concern months ago and reported that his exam was "normal" although notes are unavailable  He went through fitness to drive testing and was cleared to continue driving  Reportedly yesterday afternoon was when ophthalmology contacted the patient regarding abnormal inflammatory cells present that would be concerning for CNS lymphoma and recommended that he present to the hospital      The patient follows with Dr Homer Gurrola of [facility] neurology as an outpatient for Parkinson's Disease  He is currently taking Sinemet 25/100, 2 tablets TID  His primary symptoms include bilateral thumb tremors, gait dysfunction with balance difficulties, and difficulty swallowing at times  Dr Homer Gurrola had ordered an outpatient MRI to be completed, but the patient had not yet obtained that  Dr Homer Gurrola is aware patient is in the hospital at this time  Inpatient consult to Neurology  Consult performed by: Quita Giraldo PA-C  Consult ordered by: Aura Gilmore MD          Review of Systems   A 12 point ROS was completed  Other than the above mentioned complaints in the HPI, all remaining systems were negative        Historical Information   Past Medical History:   Diagnosis Date    A-fib ([facility] [address] )     Arthritis     Balance problems     sometimes    CHF (congestive heart failure) (HCC)     CPAP (continuous positive airway pressure) dependence     Diabetes mellitus (HCC)     Diabetic neuropathy (HCC)     bilat feet    Glaucoma     bilat    Gout     Hard of hearing     doesn t use his hearing aids    Hiatal hernia     History of total right knee replacement     History of total shoulder replacement     left    Hyperlipidemia     Hypertension     Knee pain, left     Knee pain, right     Obesity     Sleep apnea     Use of cane as ambulatory aid     Wears glasses      Past Surgical History:   Procedure Laterality Date    CARDIAC CATHETERIZATION      x2    CARDIAC CATHETERIZATION      COLONOSCOPY      COLONOSCOPY N/A 7/15/2016    Procedure: COLONOSCOPY;  Surgeon: Eric Myles MD;  Location: [facility]; Service:     EYE SURGERY      laser eye surgery    FOOT SURGERY Left     HIATAL HERNIA REPAIR      JOINT REPLACEMENT      left shoulder/right knee    TN REVISE KNEE JOINT REPLACE,1 PART Right 4/21/2017    Procedure: KNEE REVISION PARTIAL VERSUS COMPLETE REVISION;  Surgeon: Felisa Childs MD;  Location: [facility];  Service: Orthopedics    WISDOM TOOTH EXTRACTION       Social History   Social History     Substance and Sexual Activity   Alcohol Use Yes    Frequency: 2-3 times a week    Drinks per session: 1 or 2    Binge frequency: Never    Comment: socially     Social History     Substance and Sexual Activity   Drug Use No     Social History     Tobacco Use   Smoking Status Never Smoker   Smokeless Tobacco Never Used     Family History:   Family History   Problem Relation Age of Onset    Heart disease Father     Cancer Mother        Review of previous medical records was completed      Meds/Allergies   all current active meds have been reviewed, current meds:   Current Facility-Administered Medications   Medication Dose Route Frequency    allopurinol (ZYLOPRIM) tablet 300 mg  300 mg Oral Daily    [START ON 2/15/2020] aspirin (ECOTRIN LOW STRENGTH) EC tablet 81 mg  81 mg Oral Daily    atorvastatin (LIPITOR) tablet 40 mg  40 mg Oral QPM    bimatoprost (LUMIGAN) 0 01 % ophthalmic solution 1 drop  1 drop Both Eyes HS    carbidopa-levodopa (SINEMET)  mg per tablet 2 tablet  2 tablet Oral TID AC    furosemide (LASIX) tablet 10 mg  10 mg Oral Daily    insulin lispro (HumaLOG) 100 units/mL subcutaneous injection 1-5 Units  1-5 Units Subcutaneous 4x Daily (AC & HS)    lisinopril (ZESTRIL) tablet 10 mg  10 mg Oral Daily    magnesium oxide (MAG-OX) tablet 200 mg  200 mg Oral Daily    metoprolol succinate (TOPROL-XL) 24 hr tablet 100 mg  100 mg Oral Daily    polyethylene glycol (MIRALAX) packet 17 g  17 g Oral Daily PRN    potassium chloride (K-DUR,KLOR-CON) CR tablet 20 mEq  20 mEq Oral Daily    rivaroxaban (XARELTO) tablet 20 mg  20 mg Oral Daily With Dinner    senna-docusate sodium (SENOKOT S) 8 6-50 mg per tablet 2 tablet  2 tablet Oral BID PRN    tamsulosin (FLOMAX) capsule 0 4 mg  0 4 mg Oral BID    and PTA meds:   Prior to Admission Medications   Prescriptions Last Dose Informant Patient Reported? Taking? Bimatoprost (LUMIGAN OP) 2020 at Unknown time  Yes Yes   Sig: Apply 1 drop to eye daily at bedtime Both eyes    FOLIC ACID PO 8784 at Unknown time  Yes Yes   Si 4 mg daily   Multiple Vitamin (MULTIVITAMIN) tablet 2020 at Unknown time  Yes Yes   Sig: Take 1 tablet by mouth daily  allopurinol (ZYLOPRIM) 300 mg tablet 2020 at Unknown time  Yes Yes   Sig: Take 300 mg by mouth daily     aspirin 81 MG tablet 2020 at Unknown time  Yes Yes   Sig: Take 81 mg by mouth daily   atorvastatin (LIPITOR) 40 mg tablet 2020 at Unknown time  Yes Yes   Sig: Take 40 mg by mouth every evening   brimonidine (ALPHAGAN P) 0 15 % ophthalmic solution 2020 at Unknown time  No Yes   Sig: Administer 1 drop to the right eye daily   carbidopa-levodopa (SINEMET)  mg per tablet 2020 at Unknown time  No Yes   Sig: Take 2 tablets by mouth 3 (three) times a day before meals   cholecalciferol 1000 units tablet 2020 at Unknown time  No Yes   Sig: Take 1 tablet (1,000 Units total) by mouth daily   cyanocobalamin 1000 MCG tablet 2/13/2020 at Unknown time  No Yes   Sig: Take 1 tablet (1,000 mcg total) by mouth daily   furosemide (LASIX) 20 mg tablet 2/13/2020 at Unknown time  No Yes   Sig: Take 1 tablet (20 mg total) by mouth daily Do NOT take if systolic BP is under 817   Patient taking differently: Take 10 mg by mouth daily Do NOT take if systolic BP is under 570   glucose blood test strip   Yes Yes   Sig: CHECK BLOOD SUGAR ONCE OR TWICE DAILY   E11 9 Z79 4   lisinopril (ZESTRIL) 10 mg tablet Not Taking at Unknown time  Yes No   lisinopril (ZESTRIL) 5 mg tablet 2/12/2020 at Unknown time Self Yes Yes   Sig: Take 5 mg by mouth daily Dosage unknown   magnesium oxide (MAG-OX) 400 mg 2/13/2020 at Unknown time  Yes Yes   Sig: Take 200 mg by mouth daily    metFORMIN (GLUCOPHAGE) 1000 MG tablet 2/13/2020 at Unknown time Self Yes Yes   Sig: Take 1,000 mg by mouth 2 (two) times a day with meals    metoprolol succinate (TOPROL-XL) 100 mg 24 hr tablet 2/13/2020 at Unknown time  No Yes   Sig: Take 1 tablet (100 mg total) by mouth daily   Patient taking differently: Take by mouth 2 (two) times a day Dosage unknown to patient   polyethylene glycol (MIRALAX) 17 g packet  at prn Self No Yes   Sig: Take 17 g by mouth daily   Patient taking differently: Take 17 g by mouth as needed    potassium chloride (K-DUR,KLOR-CON) 20 mEq tablet 2/13/2020 at Unknown time  Yes Yes   Sig: Take by mouth daily Dosage unknown to patient   rivaroxaban (XARELTO) 20 mg tablet 2/12/2020 at Unknown time  No Yes   Sig: Take 1 tablet (20 mg total) by mouth daily with dinner   senna-docusate sodium (SENOKOT S) 8 6-50 mg per tablet  at prn  No Yes   Sig: Take 2 tablets by mouth 2 (two) times a day as needed for constipation   tamsulosin (FLOMAX) 0 4 mg 2/13/2020 at Unknown time  No Yes   Sig: Take 1 capsule (0 4 mg total) by mouth daily with dinner   Patient taking differently: Take 0 4 mg by mouth 2 (two) times a day    tamsulosin (FLOMAX) 0 4 mg Not Taking at Unknown time No No   Sig: TAKE 1 CAPSULE TWICE A DAY   Patient not taking: Reported on 12/23/2019      Facility-Administered Medications: None       No Known Allergies    Objective   Vitals:Blood pressure 124/70, pulse 71, temperature 97 8 °F (36 6 °C), resp  rate 18, weight (!) 138 kg (304 lb 3 8 oz), SpO2 96 %  ,Body mass index is 36 08 kg/m²  No intake or output data in the 24 hours ending 02/14/20 0823    Invasive Devices: Invasive Devices     Peripheral Intravenous Line            Peripheral IV 02/13/20 Right Forearm less than 1 day                Physical Exam   Constitutional: He is oriented to person, place, and time  He appears well-developed and well-nourished  No distress  Resting comfortably in chair  Somewhat anxious appearing  HENT:   Head: Normocephalic and atraumatic  Right Ear: External ear normal    Left Ear: External ear normal    Nose: Nose normal    Mouth/Throat: Oropharynx is clear and moist    Eyes: Pupils are equal, round, and reactive to light  Conjunctivae and EOM are normal  Right eye exhibits no discharge  Left eye exhibits no discharge  No scleral icterus  Slightly dysconjugate gaze- left eye exotropia  Neck: Normal range of motion  Neck supple  Cardiovascular: Normal rate  Pulmonary/Chest: Effort normal  No respiratory distress  Abdominal: Soft  Musculoskeletal: Normal range of motion  He exhibits no edema, tenderness or deformity  Neurological: He is alert and oriented to person, place, and time  He has normal strength  No sensory deficit  He has a normal Finger-Nose-Finger Test and a normal Heel to [employer]  Coordination normal    Reflex Scores:       Bicep reflexes are 2+ on the right side and 2+ on the left side  Brachioradialis reflexes are 2+ on the right side and 2+ on the left side  Patellar reflexes are 0 on the right side and 0 on the left side  Achilles reflexes are 0 on the right side and 0 on the left side    Diplopia    Skin: Skin is warm and dry  No rash noted  He is not diaphoretic  No erythema  No pallor  Psychiatric: His behavior is normal  Judgment and thought content normal    Flat affect, somewhat anxious  Nursing note and vitals reviewed  Neurologic Exam     Mental Status   Oriented to person, place, and time  Level of consciousness: alert  Able to name object  Able to repeat  No dysarthria or aphasia  Follows all commands without difficulty  Hypophonia noted  Cranial Nerves     CN II   Visual fields full to confrontation  Visual acuity: (Subjectively decreased, but able to read sentences with correction  )    CN III, IV, VI   Pupils are equal, round, and reactive to light  Extraocular motions are normal    Right pupil: Size: 3 mm  Shape: regular  Reactivity: brisk  Left pupil: Size: 3 mm  Shape: regular  Reactivity: brisk  Conjugate gaze: absent (Dysconjugate gaze present with left eye exotropia  )    CN V   Facial sensation intact  CN VII   Facial expression full, symmetric  CN VIII   Hearing impaired: Grossly intact for examination, but decreased chronically  CN XI   Right sternocleidomastoid strength: normal  Left sternocleidomastoid strength: normal  Right trapezius strength: normal  Left trapezius strength: normal    CN XII   Tongue: not atrophic  Fasciculations: absent  Tongue deviation: none  Subtle far end gaze horizontal nystagmus to the right  Motor Exam     Strength   Strength 5/5 throughout  Sensory Exam   Light touch normal    Sensation to temperature intact  Vibration decreased at bilateral toes, symmetric  Intact in UE  Gait, Coordination, and Reflexes     Coordination   Finger to nose coordination: normal  Heel to shin coordination: normal    Reflexes   Right brachioradialis: 2+  Left brachioradialis: 2+  Right biceps: 2+  Left biceps: 2+  Right patellar: 0  Left patellar: 0  Right achilles: 0  Left achilles: 0  No tremors noted during evaluation          Lab Results: I have personally reviewed pertinent reports  Imaging Studies: I have personally reviewed pertinent reports    (prior [facility] CT reports)  EKG, Pathology, and Other Studies: I have personally reviewed pertinent reports  VTE Prophylaxis: Sequential compression device (Venodyne)  Xarelto on hold in anticipation of LP      Code Status: Level 1 - Full Code  Advance Directive and Living Will: Yes

## 2020-02-14 NOTE — PLAN OF CARE
Problem: PHYSICAL THERAPY ADULT  Goal: Performs mobility at highest level of function for planned discharge setting  See evaluation for individualized goals  Description  Treatment/Interventions: Functional transfer training, LE strengthening/ROM, Elevations, Therapeutic exercise, Patient/family training, Equipment eval/education, Bed mobility, Gait training, Continued evaluation, Spoke to nursing, OT          See flowsheet documentation for full assessment, interventions and recommendations  Note:   Prognosis: Fair  Problem List: Decreased strength, Decreased range of motion, Impaired balance, Decreased mobility, Decreased coordination, Impaired vision  Assessment: Ben Richards is a 65 y o  male admitted to [facility] on 2/13/2020 for Visual changes  Pt  has a past medical history of A-fib ([facility] [address] ), Arthritis, Balance problems, CHF (congestive heart failure) ([facility] [address] ), CPAP (continuous positive airway pressure) dependence, Diabetes mellitus ([facility] [address] ), Diabetic neuropathy ([facility] [address] ), Glaucoma, Gout, History of total right knee replacement, History of total shoulder replacement, Hyperlipidemia, Hypertension, Obesity  PT was consulted and pt was seen on 2/14/2020 for mobility assessment and d/c planning  Pt presents high fall risk precautions, PIV  Pt w prev hospital admission to [facility] from 1/21-23/20 for a fall, w d/c home and HHPT  Pt continues to receive home PT/OT x2/wk  Pt lives with his wife, dtr and grandson in a two story home with 2 MORIAH and FOS to second story bed and bath  Prior to admission pt reports being indep w ADLs, splits IADLs w family, - and relies on dtr for transport  Reports between his family members he is usually never alone, dtr can provide physical A at time of d/c if needed  Currently uses RW on main level of the home, use of cane on second story, mixed use of RW or cane for community ambulation   Pt is currently functioning at minimum assistance x1 level for transfers, minimum assistance x1 level for ambulation with Rolling Walker  Pt demonstrated mild instability w mobility, increased reliance on RW or environment for transf and amb  Noted limited DF ROM during exam contribution to occ foot drag/decreased foot clearance during amb  Pt notes continued issues w vision including double vision and difficulty focusing on objects near to him, however denies vision impacting mobility and denies concerns w d/c home including stair negotiation  Pt will benefit from continued skilled IP PT to address the above mentioned impairments  in order to maximize recovery and increase functional independence when completing mobility and ADLs  At this time PT recommendations for d/c are home w family support and continuation of HHPT when medically stable  Barriers to Discharge: Inaccessible home environment  Barriers to Discharge Comments: MORIAH  Recommendation: Home with family support, Home PT     PT - OK to Discharge: Yes    See flowsheet documentation for full assessment

## 2020-02-14 NOTE — OCCUPATIONAL THERAPY NOTE
Occupational Therapy Evaluation     Patient Name: Deirdre Jackson  [id number] Date: 2/14/2020  Problem List  Principal Problem:    Visual changes  Active Problems:    Parkinson's disease ([place] [address] )    HTN (hypertension)    Diabetes mellitus type 2 with neurological manifestations ([place] [address] )    Atrial fibrillation, chronic    Hypertrophic cardiomyopathy ([facility])    Chronic diastolic CHF (congestive heart failure) ([place] [address] )    Past Medical History  Past Medical History:   Diagnosis Date    A-fib ([place] [address] )     Arthritis     Balance problems     sometimes    CHF (congestive heart failure) ([facility])     CPAP (continuous positive airway pressure) dependence     Diabetes mellitus ([place] [address] )     Diabetic neuropathy ([facility])     bilat feet    Glaucoma     bilat    Gout     Hard of hearing     doesn t use his hearing aids    Hiatal hernia     History of total right knee replacement     History of total shoulder replacement     left    Hyperlipidemia     Hypertension     Knee pain, left     Knee pain, right     Obesity     Sleep apnea     Use of cane as ambulatory aid     Wears glasses      Past Surgical History  Past Surgical History:   Procedure Laterality Date    CARDIAC CATHETERIZATION      x2    CARDIAC CATHETERIZATION      COLONOSCOPY      COLONOSCOPY N/A 7/15/2016    Procedure: COLONOSCOPY;  Surgeon: Winnie Foster MD;  Location: [facility];   Service:     EYE SURGERY      laser eye surgery    FOOT SURGERY Left     HIATAL HERNIA REPAIR      JOINT REPLACEMENT      left shoulder/right knee    [place] REVISE KNEE JOINT REPLACE,1 PART Right 4/21/2017    Procedure: KNEE REVISION PARTIAL VERSUS COMPLETE REVISION;  Surgeon: Mikal Carter MD;  Location: [facility];  Service: Orthopedics    WISDOM TOOTH EXTRACTION               02/14/20 0922   Note Type   Note type Eval/Treat   Restrictions/Precautions   Weight Bearing Precautions Per Order No   Other Precautions Fall Risk;Visual impairment  ([place])   Pain Assessment   Pain Assessment No/denies pain   Pain Score No Pain   Home Living   Type of [address] Two level;Stairs to enter without rails;Bed/bath upstairs;1/2 bath on main level  (2 small steps to enter; flight to 2nd floor)   Bathroom Shower/Tub Walk-in shower   Bathroom Toilet Raised   Bathroom Equipment Shower chair; Toilet raiser   [address] Walker;Cane  (walker bag)   Additional Comments pt lives w/ spouse and daughter; reports spouse and daughter home w/ him; spouse unable to physically assist him   Prior Function   Level of [place] Independent with ADLs and functional mobility; Needs assistance with IADLs; Needs assistance with ADLs and functional mobility  (RW or SPC for functional mobility)   Lives With Spouse   Receives Help From Family   ADL Assistance Independent   IADLs Needs assistance  (wife and daughter assist)   Falls in the last 6 months 1 to 4   Vocational Retired   Comments pt daughter provides transportation; pt reports use of RW on first floor and SPC 2nd floor and holds onto furniture in the bathroom   Lifestyle   Autonomy per pt independent w/ ADLs, independent w/ functional transfers and mobilty w/ RW or SPC or furniture walking   Reciprocal Relationships spouse and daught   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   Subjective   Subjective "My vision changes are what I want to work on"   ADL   Where [address] 5  Supervision/Setup   Grooming Assistance 5  Supervision/Setup   [address] 5  Supervision/Setup   LB [address] 4  [address] [employer] 5  Supervision/Setup    [facility] [address]  [address] [place] [address] 4  Minimal Assistance   Bed Mobility   Supine to Sit Unable to assess   Additional Comments pt seated in recliner pre/post session   Transfers   Sit to Stand 4 Minimal assistance   Additional items Assist x 1; Increased time required;Verbal cues;Armrests   Stand to Sit 4  Minimal assistance   Additional items Assist x 1; Increased time required;Verbal cues;Armrests   Toilet transfer 4  Minimal assistance   Additional items Assist x 1; Increased time required;Verbal cues;Standard toilet  (grab bar use)   Additional Comments cues for hand placement and positioning and for controlled descent; cues to keep RW w/ him at all times during transfers   Functional Mobility   Functional Mobility 4  Minimal assistance   Additional Comments assist x1 w/ increased time to complete and cues for safety pace, forward flexed posture   Additional items Rolling walker   Balance   Static Sitting Good   Dynamic Sitting Fair +   Static Standing Fair -   Dynamic Standing Poor +   Ambulatory Poor +   Activity Tolerance   Activity Tolerance Patient limited by fatigue  (visual deficits)   Nurse Made Aware appropriate to see per Lamar ZAVALA   RUOMAR Assessment   RUE Assessment WFL  (grossly 3+/5 proximal, distal 4-/5)   LUE Assessment   LUE Assessment WFL  (grossly 3+/5 proximal and 4-/5 distal)   Hand Function   Gross Motor Coordination   (R hand dysdiadochokinesia, bradykinesia)   Fine Motor Coordination Functional   Sensation   Light Touch Partial deficits in the RLE   Additional Comments reports neuropathy   Proprioception   Proprioception No apparent deficits   Vision-Basic Assessment   Current Vision Wears glasses all the time   Patient Visual Report Diplopia; Blurring of vision when changing focal distance; Difficulty maintaining concentration with focus  (reports can only focus on one item at a time)   Vision - Complex Assessment   Ocular Range of Motion WFL   Head Position WDL   Tracking Able to track stimulus in all quads without difficulty   Acuity Able to read clock/calendar on wall without difficulty  (difficulty focusing to read print on menu)   Perception   Inattention/Neglect Appears intact Cognition   Overall Cognitive Status Impaired   Arousal/Participation Responsive; Cooperative; Alert   Attention Attends with cues to redirect   Orientation Level Oriented to person;Oriented to time;Oriented to place;Oriented to situation;Oriented X4   Memory Decreased recall of precautions;Decreased short term memory   Following Commands Follows one step commands with increased time or repetition   Comments increased processing time, pt w/ cues to redirect to tasks, decreased insight into safety awareness   Assessment   Limitation Decreased ADL status; Decreased UE strength;Decreased Safe judgement during ADL;Decreased cognition;Decreased endurance;Decreased self-care trans;Decreased high-level ADLs; Decreased sensation;Visual deficit   Prognosis Good   Assessment Pt is a 65 y o  male seen for OT evaluation s/p admit to [facility] on 2/13/2020 w/ Visual changes  MRI pending  Comorbidities affecting pt's functional performance at time of assessment include: Parkinson's disease, CHF, a-fib, DM II, HTN  Personal factors affecting pt at time of IE include:frequent falls, per chart  Prior to admission, pt was living w/ spouse, daughter and grandson and reports independent w/ ADLs, independent w/ functional transfers and mobility w/ Rw or SPC or furniture walking in bathroom, independent w/ light IADLs, spouse assists, daughter provides transport  Upon evaluation: Pt requires MIN assist sit<>stand w/ VCs for hand placement and positioning and to keep RW w/ him at all times, MIN assist functional mobility w/ RW, MIN assist LB ADLs, setup UB ADLs, MIN assist toileting 2* the following deficits impacting occupational performance: visual deficits (reports difficulty focusing on items, double vision, unable to read text print on menu), impaired balance, decreased b/l UE coordination w/ bradykinesia, fall risk, impaired safety awareness, increased processing time   Pt to benefit from continued skilled OT tx while in the hospital to address deficits as defined above and maximize level of functional independence w ADL's and functional mobility  Occupational Performance areas to address include: grooming, bathing/shower, toilet hygiene, dressing, health maintenance, functional mobility, clothing management, cleaning and meal prep, formal cognitive assessment, visual assessments  From OT standpoint, recommendation at time of d/c would be home w/ HOME OT and increased family support/supervision  Pt would benefit from rehab however declining  Goals   Patient Goals "to go home"   LTG Time Frame 10-14   Long Term Goal please see below goals   Plan   Treatment Interventions ADL retraining;Functional transfer training;Visual perceptual retraining; Endurance training;UE strengthening/ROM; Patient/family training;Cognitive reorientation;Equipment evaluation/education; Compensatory technique education; Energy conservation; Activityengagement; Fine motor coordination activities   Goal Expiration Date 02/28/20   OT Frequency 3-5x/wk   Recommendation   OT Discharge Recommendation Home OT  (pt declines STR)   OT - OK to Discharge   (when medically stable)   Barthel Index   Feeding 10   Bathing 0   Grooming Score 5   Dressing Score 5   Bladder Score 10   Bowels Score 10   Toilet Use Score 5   Transfers (Bed/Chair) Score 10   Mobility (Level Surface) Score 10   Stairs Score 0   Barthel Index Score 65   Modified [place] Scale   Modified [place] Scale 4     Occupational Therapy Goals to be met in 10-14 days:  1) Pt will improve activity tolerance to G for 30 min txment sessions to enhance ADLs  2) Pt will complete ADLs/self care w/ supervision  3) Pt will complete toileting w/ supervision w/ G hygiene/thoroughness using DME PRN  4) Pt will improve functional transfers on/off all surfaces using DME PRN w/ G balance/safety including toileting w/ supervision  5) Pt will improve fx'l mobility during I/ADl/leisure tasks using DME PRN w/ g balance/safety w/ supervision  6) Pt will engage in ongoing cognitive assessment w/ G participation to A w/ safe d/c planning/recommendations  7) Pt will demonstrate G carryover of pt/caregiver education and training as appropriate w/ mod I  w/ G tolerance  8) Pt will engage in depression screen/leisure interest checklist w/ G participation to monitor s/s depression and ID 3 positive coping strategies to A w/ emotional regulation and management  9) Pt will demonstrate 100% carryover of E C  techniques w/ mod I t/o fx'l I/ADL/leisure tasks w/o cues s/p skilled education  10) Pt will demonstrate improved bed mobility to supervision to enhance ADLs  11) Pt will demonstrate improved standing tolerance to 3-5 minutes during functional tasks w/ Fair + dynamic standing balance to enhance ADL performance  11) Pt will demonstrate improved b/l UE strength by 1 MMT grade to enhance ADLS and functional transfers  12) Pt will recall 3 fall prevention education safety techniques to enhance safety and prevent further falls    Documentation completed by: Jamison Houser MS, OTR/L

## 2020-02-15 LAB
GLUCOSE SERPL-MCNC: 127 MG/DL (ref 65–140)
GLUCOSE SERPL-MCNC: 165 MG/DL (ref 65–140)
GLUCOSE SERPL-MCNC: 169 MG/DL (ref 65–140)
GLUCOSE SERPL-MCNC: 181 MG/DL (ref 65–140)

## 2020-02-15 PROCEDURE — 82948 REAGENT STRIP/BLOOD GLUCOSE: CPT

## 2020-02-15 PROCEDURE — 99232 SBSQ HOSP IP/OBS MODERATE 35: CPT | Performed by: PHYSICIAN ASSISTANT

## 2020-02-15 RX ORDER — ACETAMINOPHEN 325 MG/1
650 TABLET ORAL EVERY 6 HOURS PRN
Status: DISCONTINUED | OUTPATIENT
Start: 2020-02-15 | End: 2020-02-19 | Stop reason: HOSPADM

## 2020-02-15 RX ADMIN — LISINOPRIL 10 MG: 10 TABLET ORAL at 09:13

## 2020-02-15 RX ADMIN — TAMSULOSIN HYDROCHLORIDE 0.4 MG: 0.4 CAPSULE ORAL at 17:25

## 2020-02-15 RX ADMIN — ALLOPURINOL 300 MG: 100 TABLET ORAL at 09:13

## 2020-02-15 RX ADMIN — ACETAMINOPHEN 650 MG: 325 TABLET ORAL at 21:27

## 2020-02-15 RX ADMIN — INSULIN LISPRO 1 UNITS: 100 INJECTION, SOLUTION INTRAVENOUS; SUBCUTANEOUS at 11:38

## 2020-02-15 RX ADMIN — ATORVASTATIN CALCIUM 40 MG: 40 TABLET, FILM COATED ORAL at 17:25

## 2020-02-15 RX ADMIN — CARBIDOPA AND LEVODOPA 2 TABLET: 25; 100 TABLET ORAL at 11:36

## 2020-02-15 RX ADMIN — CARBIDOPA AND LEVODOPA 2 TABLET: 25; 100 TABLET ORAL at 17:25

## 2020-02-15 RX ADMIN — POTASSIUM CHLORIDE 20 MEQ: 1500 TABLET, EXTENDED RELEASE ORAL at 09:12

## 2020-02-15 RX ADMIN — MAGNESIUM GLUCONATE 500 MG ORAL TABLET 200 MG: 500 TABLET ORAL at 09:12

## 2020-02-15 RX ADMIN — INSULIN LISPRO 1 UNITS: 100 INJECTION, SOLUTION INTRAVENOUS; SUBCUTANEOUS at 21:18

## 2020-02-15 RX ADMIN — INSULIN LISPRO 1 UNITS: 100 INJECTION, SOLUTION INTRAVENOUS; SUBCUTANEOUS at 17:26

## 2020-02-15 RX ADMIN — CARBIDOPA AND LEVODOPA 2 TABLET: 25; 100 TABLET ORAL at 06:02

## 2020-02-15 RX ADMIN — BIMATOPROST 1 DROP: 0.1 SOLUTION/ DROPS OPHTHALMIC at 21:18

## 2020-02-15 RX ADMIN — TAMSULOSIN HYDROCHLORIDE 0.4 MG: 0.4 CAPSULE ORAL at 09:13

## 2020-02-15 RX ADMIN — METOPROLOL SUCCINATE 100 MG: 50 TABLET, EXTENDED RELEASE ORAL at 09:12

## 2020-02-15 RX ADMIN — FUROSEMIDE 10 MG: 20 TABLET ORAL at 09:12

## 2020-02-15 NOTE — PLAN OF CARE
Problem: Potential for Falls  Goal: Patient will remain free of falls  Description  INTERVENTIONS:  - Assess patient frequently for physical needs  -  Identify cognitive and physical deficits and behaviors that affect risk of falls    -  [place] fall precautions as indicated by assessment   - Educate patient/family on patient safety including physical limitations  - Instruct patient to call for assistance with activity based on assessment  - Modify environment to reduce risk of injury  - Consider OT/PT consult to assist with strengthening/mobility  Outcome: Progressing     Problem: METABOLIC, FLUID AND ELECTROLYTES - ADULT  Goal: Glucose maintained within target range  Description  INTERVENTIONS:  - Monitor Blood Glucose as ordered  - Assess for signs and symptoms of hyperglycemia and hypoglycemia  - Administer ordered medications to maintain glucose within target range  - Assess nutritional intake and initiate nutrition service referral as needed  Outcome: Progressing     Problem: MUSCULOSKELETAL - ADULT  Goal: Maintain or return mobility to safest level of function  Description  INTERVENTIONS:  - Assess patient's ability to carry out ADLs; assess patient's baseline for ADL function and identify physical deficits which impact ability to perform ADLs (bathing, care of mouth/teeth, toileting, grooming, dressing, etc )  - Assess/evaluate cause of self-care deficits   - Assess range of motion  - Assess patient's mobility  - Assess patient's need for assistive devices and provide as appropriate  - Encourage maximum independence but intervene and supervise when necessary  - Involve family in performance of ADLs  - Assess for home care needs following discharge   - Consider OT consult to assist with ADL evaluation and planning for discharge  - Provide patient education as appropriate  Outcome: Progressing     Problem: PAIN - ADULT  Goal: Verbalizes/displays adequate comfort level or baseline comfort level  Description  Interventions:  - Encourage patient to monitor pain and request assistance  - Assess pain using appropriate pain scale  - Administer analgesics based on type and severity of pain and evaluate response  - Implement non-pharmacological measures as appropriate and evaluate response  - Consider cultural and social influences on pain and pain management  - Notify physician/advanced practitioner if interventions unsuccessful or patient reports new pain  Outcome: Progressing     Problem: DISCHARGE PLANNING  Goal: Discharge to home or other facility with appropriate resources  Description  INTERVENTIONS:  - Identify barriers to discharge w/patient and caregiver  - Arrange for needed discharge resources and transportation as appropriate  - Identify discharge learning needs (meds, wound care, etc )  - Arrange for interpretive services to assist at discharge as needed  - Refer to Case Management Department for coordinating discharge planning if the patient needs post-hospital services based on physician/advanced practitioner order or complex needs related to functional status, cognitive ability, or social support system  Outcome: Progressing     Problem: Knowledge Deficit  Goal: Patient/family/caregiver demonstrates understanding of disease process, treatment plan, medications, and discharge instructions  Description  Complete learning assessment and assess knowledge base    Interventions:  - Provide teaching at level of understanding  - Provide teaching via preferred learning methods  Outcome: Progressing

## 2020-02-15 NOTE — ASSESSMENT & PLAN NOTE
Wt Readings from Last 3 Encounters:   02/15/20 (!) 138 kg (303 lb 9 2 oz)   12/23/19 (!) 137 kg (302 lb)   06/17/19 136 kg (299 lb)     Euvolemic, continue home medication    Continue to follow Cardiology as an outpatient

## 2020-02-15 NOTE — PLAN OF CARE
Problem: Potential for Falls  Goal: Patient will remain free of falls  Description  INTERVENTIONS:  - Assess patient frequently for physical needs  -  Identify cognitive and physical deficits and behaviors that affect risk of falls    -  [place] fall precautions as indicated by assessment   - Educate patient/family on patient safety including physical limitations  - Instruct patient to call for assistance with activity based on assessment  - Modify environment to reduce risk of injury  - Consider OT/PT consult to assist with strengthening/mobility  Outcome: Progressing     Problem: METABOLIC, FLUID AND ELECTROLYTES - ADULT  Goal: Glucose maintained within target range  Description  INTERVENTIONS:  - Monitor Blood Glucose as ordered  - Assess for signs and symptoms of hyperglycemia and hypoglycemia  - Administer ordered medications to maintain glucose within target range  - Assess nutritional intake and initiate nutrition service referral as needed  Outcome: Progressing     Problem: MUSCULOSKELETAL - ADULT  Goal: Maintain or return mobility to safest level of function  Description  INTERVENTIONS:  - Assess patient's ability to carry out ADLs; assess patient's baseline for ADL function and identify physical deficits which impact ability to perform ADLs (bathing, care of mouth/teeth, toileting, grooming, dressing, etc )  - Assess/evaluate cause of self-care deficits   - Assess range of motion  - Assess patient's mobility  - Assess patient's need for assistive devices and provide as appropriate  - Encourage maximum independence but intervene and supervise when necessary  - Involve family in performance of ADLs  - Assess for home care needs following discharge   - Consider OT consult to assist with ADL evaluation and planning for discharge  - Provide patient education as appropriate  Outcome: Progressing     Problem: PAIN - ADULT  Goal: Verbalizes/displays adequate comfort level or baseline comfort level  Description  Interventions:  - Encourage patient to monitor pain and request assistance  - Assess pain using appropriate pain scale  - Administer analgesics based on type and severity of pain and evaluate response  - Implement non-pharmacological measures as appropriate and evaluate response  - Consider cultural and social influences on pain and pain management  - Notify physician/advanced practitioner if interventions unsuccessful or patient reports new pain  Outcome: Progressing     Problem: DISCHARGE PLANNING  Goal: Discharge to home or other facility with appropriate resources  Description  INTERVENTIONS:  - Identify barriers to discharge w/patient and caregiver  - Arrange for needed discharge resources and transportation as appropriate  - Identify discharge learning needs (meds, wound care, etc )  - Arrange for interpretive services to assist at discharge as needed  - Refer to Case Management Department for coordinating discharge planning if the patient needs post-hospital services based on physician/advanced practitioner order or complex needs related to functional status, cognitive ability, or social support system  Outcome: Progressing     Problem: Knowledge Deficit  Goal: Patient/family/caregiver demonstrates understanding of disease process, treatment plan, medications, and discharge instructions  Description  Complete learning assessment and assess knowledge base    Interventions:  - Provide teaching at level of understanding  - Provide teaching via preferred learning methods  Outcome: Progressing

## 2020-02-15 NOTE — ASSESSMENT & PLAN NOTE
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

## 2020-02-15 NOTE — PROGRESS NOTES
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  MRI pending until pacemaker interrogated to confirm compatibility   Holding Xarelto and aspirin with plan for LP Monday   No acute changes in vision       Atrial fibrillation, chronic  Assessment & Plan  Rate controlled, continue metoprolol   Holding Xarelto for lumbar puncture    Chronic diastolic CHF (congestive heart failure) (HCC)  Assessment & Plan  Wt Readings from Last 3 Encounters:   02/15/20 (!) 138 kg (303 lb 9 2 oz)   12/23/19 (!) 137 kg (302 lb)   06/17/19 136 kg (299 lb)     Euvolemic, continue home medication  Continue to follow Cardiology as an outpatient        Diabetes mellitus type 2 with neurological manifestations [facility])  Assessment & Plan  Lab Results   Component Value Date    HGBA1C 5 9 09/28/2018       Recent Labs     02/14/20  1104 02/14/20  1619 02/14/20  2101 02/15/20  0713   POCGLU 171* 136 145* 127       Blood Sugar Average: Last 72 hrs:  (P) 141 [id number]   hold metformin  Accuchecks, SSI       Hypertrophic cardiomyopathy [facility])  Assessment & Plan  Follows with [facility] Cardiology   S/p Biventricular implantable cardioverter-defibrillator (ICD) in situ 12/17/2019   Appears euvolemic  Continue lasix regimen   Continue to follow with Cardiology as an outpatient      Parkinson's disease ([facility] [address] )  Assessment & Plan  Continue Sinemet  PT/OT: home PT/OT    HTN (hypertension)  Assessment & Plan  BP stable  Maintained on lisinopril, toprol XL and lasix   Monitor VS       VTE Pharmacologic Prophylaxis:   Pharmacologic: Pharmacologic VTE Prophylaxis contraindicated due to holding Xarelto for lumbar punctre Mechanical VTE Prophylaxis in Place: Yes    Patient Centered Rounds: I have performed bedside rounds with nursing staff today  Discussions with Specialists or Other Care Team Provider: Neurology, spoke with Dr Brigido Duverney     Education and Discussions with Family / Patient: patient     Time Spent for Care: 20 minutes  More than 50% of total time spent on counseling and coordination of care as described above  Current Length of Stay: 2 day(s)    Current Patient Status: Inpatient   Certification Statement: The patient will continue to require additional inpatient hospital stay due to vision changes     Discharge Plan: pending LP and MRI     Code Status: Level 1 - Full Code      Subjective:   Patient has no acute changes in vision  Per nursing was confused this morning without focal neuro deficits  Is now back to baseline  A[patient states had weird dreams and did not sleep well last evening likely contributing  No numbness tingling, headaches or other acute changes/complaints  Objective:     Vitals:   Temp (24hrs), Av 9 °F (36 6 °C), Min:97 5 °F (36 4 °C), Max:98 4 °F (36 9 °C)    Temp:  [97 5 °F (36 4 °C)-98 4 °F (36 9 °C)] 98 4 °F (36 9 °C)  HR:  [70-74] 72  Resp:  [19-20] 19  BP: (100-113)/(61-67) 113/67  SpO2:  [95 %-98 %] 95 %  Body mass index is 36 kg/m²  Input and Output Summary (last 24 hours):     No intake or output data in the 24 hours ending 02/15/20 1001    Physical Exam:     Physical Exam   Constitutional: No distress  HENT:   Head: Normocephalic  Eyes: Conjunctivae are normal    Blurry and double vision, unchanged    Neck: Neck supple  Cardiovascular: Normal rate and regular rhythm  Pulmonary/Chest: Effort normal and breath sounds normal    Abdominal: Soft  Bowel sounds are normal    Musculoskeletal: He exhibits edema  Neurological: He is alert  Skin: Skin is warm  Psychiatric: He has a normal mood and affect  Nursing note and vitals reviewed          Additional Data: Labs:    Results from last 7 days   Lab Units 02/14/20  0502   WBC Thousand/uL 6 32   HEMOGLOBIN g/dL 12 9   HEMATOCRIT % 41 0   PLATELETS Thousands/uL 228   NEUTROS PCT % 54   LYMPHS PCT % 34   MONOS PCT % 10   EOS PCT % 1     Results from last 7 days   Lab Units 02/14/20  0502   SODIUM mmol/L 142   POTASSIUM mmol/L 3 7   CHLORIDE mmol/L 104   CO2 mmol/L 28   BUN mg/dL 12   CREATININE mg/dL 0 66   ANION GAP mmol/L 10   CALCIUM mg/dL 9 1   ALBUMIN g/dL 3 6   TOTAL BILIRUBIN mg/dL 0 85   ALK PHOS U/L 105   ALT U/L 15   AST U/L 9   GLUCOSE RANDOM mg/dL 120         Results from last 7 days   Lab Units 02/15/20  0713 02/14/20  2101 02/14/20  1619 02/14/20  1104 02/14/20  0744 02/13/20  2155   POC GLUCOSE mg/dl 127 145* 136 171* 121 148*                   * I Have Reviewed All Lab Data Listed Above    * Additional Pertinent Lab Tests Reviewed: No New Labs Available For Today    Imaging:    Imaging Reports Reviewed Today Include:   Imaging Personally Reviewed by Myself Includes:  Pending MRI     Recent Cultures (last 7 days):           Last 24 Hours Medication List:     Current Facility-Administered Medications:  allopurinol 300 mg Oral Daily Riccardo Reyes MD   atorvastatin 40 mg Oral QPM Riccardo Reyes MD   bimatoprost 1 drop Both Eyes HS Riccardo Reyes MD   carbidopa-levodopa 2 tablet Oral TID AC Riccardo Reyes MD   furosemide 10 mg Oral Daily Riccardo Reyes MD   insulin lispro 1-5 Units Subcutaneous 4x Daily (AC & HS) Riccardo Reyes MD   lisinopril 10 mg Oral Daily Riccardo Reyes MD   magnesium oxide 200 mg Oral Daily Riccardo Reyes MD   metoprolol succinate 100 mg Oral Daily Riccardo Reyes MD   polyethylene glycol 17 g Oral Daily PRN Riccardo Reyes MD   potassium chloride 20 mEq Oral Daily Riccardo Reyes MD   senna-docusate sodium 2 tablet Oral BID PRN Riccardo Reyes MD   tamsulosin 0 4 mg Oral BID Riccardo Reyes MD        Today, Patient Was Seen By: Nilda Rueda PA-C    ** Please Note: Dictation voice to text software may have been used in the creation of this document   **

## 2020-02-15 NOTE — ASSESSMENT & PLAN NOTE
Lab Results   Component Value Date    HGBA1C 5 9 09/28/2018       Recent Labs     02/14/20  1104 02/14/20  1619 02/14/20  2101 02/15/20  0713   POCGLU 171* 136 145* 127       Blood Sugar Average: Last 72 hrs:  (P) 141 [id number]   hold metformin    Accuchecks, SSI

## 2020-02-15 NOTE — ASSESSMENT & PLAN NOTE
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

## 2020-02-16 ENCOUNTER — APPOINTMENT (INPATIENT)
Dept: CT IMAGING | Facility: HOSPITAL | Age: 70
DRG: 123 | End: 2020-02-16
Payer: MEDICARE

## 2020-02-16 LAB
GLUCOSE SERPL-MCNC: 120 MG/DL (ref 65–140)
GLUCOSE SERPL-MCNC: 151 MG/DL (ref 65–140)
GLUCOSE SERPL-MCNC: 151 MG/DL (ref 65–140)
GLUCOSE SERPL-MCNC: 93 MG/DL (ref 65–140)

## 2020-02-16 PROCEDURE — 70470 CT HEAD/BRAIN W/O & W/DYE: CPT

## 2020-02-16 PROCEDURE — 99232 SBSQ HOSP IP/OBS MODERATE 35: CPT | Performed by: PHYSICIAN ASSISTANT

## 2020-02-16 PROCEDURE — 82948 REAGENT STRIP/BLOOD GLUCOSE: CPT

## 2020-02-16 RX ORDER — OXYCODONE HYDROCHLORIDE 5 MG/1
5 TABLET ORAL EVERY 4 HOURS PRN
Status: DISCONTINUED | OUTPATIENT
Start: 2020-02-16 | End: 2020-02-19 | Stop reason: HOSPADM

## 2020-02-16 RX ORDER — FLUTICASONE PROPIONATE 50 MCG
1 SPRAY, SUSPENSION (ML) NASAL DAILY
Status: DISCONTINUED | OUTPATIENT
Start: 2020-02-16 | End: 2020-02-19 | Stop reason: HOSPADM

## 2020-02-16 RX ADMIN — TAMSULOSIN HYDROCHLORIDE 0.4 MG: 0.4 CAPSULE ORAL at 08:16

## 2020-02-16 RX ADMIN — CARBIDOPA AND LEVODOPA 2 TABLET: 25; 100 TABLET ORAL at 17:36

## 2020-02-16 RX ADMIN — FUROSEMIDE 10 MG: 20 TABLET ORAL at 08:16

## 2020-02-16 RX ADMIN — TAMSULOSIN HYDROCHLORIDE 0.4 MG: 0.4 CAPSULE ORAL at 17:36

## 2020-02-16 RX ADMIN — INSULIN LISPRO 1 UNITS: 100 INJECTION, SOLUTION INTRAVENOUS; SUBCUTANEOUS at 21:04

## 2020-02-16 RX ADMIN — INSULIN LISPRO 1 UNITS: 100 INJECTION, SOLUTION INTRAVENOUS; SUBCUTANEOUS at 13:07

## 2020-02-16 RX ADMIN — ATORVASTATIN CALCIUM 40 MG: 40 TABLET, FILM COATED ORAL at 17:36

## 2020-02-16 RX ADMIN — CARBIDOPA AND LEVODOPA 2 TABLET: 25; 100 TABLET ORAL at 13:07

## 2020-02-16 RX ADMIN — MAGNESIUM GLUCONATE 500 MG ORAL TABLET 200 MG: 500 TABLET ORAL at 08:15

## 2020-02-16 RX ADMIN — ALLOPURINOL 300 MG: 100 TABLET ORAL at 08:15

## 2020-02-16 RX ADMIN — POLYETHYLENE GLYCOL 3350 17 G: 17 POWDER, FOR SOLUTION ORAL at 21:09

## 2020-02-16 RX ADMIN — POTASSIUM CHLORIDE 20 MEQ: 1500 TABLET, EXTENDED RELEASE ORAL at 08:16

## 2020-02-16 RX ADMIN — ACETAMINOPHEN 650 MG: 325 TABLET ORAL at 08:16

## 2020-02-16 RX ADMIN — BIMATOPROST 1 DROP: 0.1 SOLUTION/ DROPS OPHTHALMIC at 21:03

## 2020-02-16 RX ADMIN — FLUTICASONE PROPIONATE 1 SPRAY: 50 SPRAY, METERED NASAL at 17:35

## 2020-02-16 RX ADMIN — LISINOPRIL 10 MG: 10 TABLET ORAL at 08:16

## 2020-02-16 RX ADMIN — OXYCODONE HYDROCHLORIDE 5 MG: 5 TABLET ORAL at 17:36

## 2020-02-16 RX ADMIN — CARBIDOPA AND LEVODOPA 2 TABLET: 25; 100 TABLET ORAL at 06:51

## 2020-02-16 RX ADMIN — METOPROLOL SUCCINATE 100 MG: 50 TABLET, EXTENDED RELEASE ORAL at 08:15

## 2020-02-16 RX ADMIN — IOHEXOL 85 ML: 350 INJECTION, SOLUTION INTRAVENOUS at 14:08

## 2020-02-16 NOTE — ASSESSMENT & PLAN NOTE
Follows with [facility] Cardiology   S/p Biventricular implantable cardioverter-defibrillator (ICD) in situ 12/17/2019   Appears euvolemic  Continue lasix regimen   Continue to follow with Cardiology as an outpatient

## 2020-02-16 NOTE — ASSESSMENT & PLAN NOTE
Wt Readings from Last 3 Encounters:   02/16/20 (!) 138 kg (304 lb 3 8 oz)   12/23/19 (!) 137 kg (302 lb)   06/17/19 136 kg (299 lb)     Euvolemic, continue home medication    Continue to follow Cardiology as an outpatient

## 2020-02-16 NOTE — ASSESSMENT & PLAN NOTE
Lab Results   Component Value Date    HGBA1C 5 9 09/28/2018       Recent Labs     02/15/20  1122 02/15/20  1559 02/15/20  2034 02/16/20  0708   POCGLU 181* 169* 165* 120       Blood Sugar Average: Last 72 hrs:  (P) 148 3   hold metformin    Accuchecks, SSI

## 2020-02-16 NOTE — PLAN OF CARE
Problem: Potential for Falls  Goal: Patient will remain free of falls  Description  INTERVENTIONS:  - Assess patient frequently for physical needs  -  Identify cognitive and physical deficits and behaviors that affect risk of falls    -  [place] fall precautions as indicated by assessment   - Educate patient/family on patient safety including physical limitations  - Instruct patient to call for assistance with activity based on assessment  - Modify environment to reduce risk of injury  - Consider OT/PT consult to assist with strengthening/mobility  Outcome: Progressing     Problem: METABOLIC, FLUID AND ELECTROLYTES - ADULT  Goal: Glucose maintained within target range  Description  INTERVENTIONS:  - Monitor Blood Glucose as ordered  - Assess for signs and symptoms of hyperglycemia and hypoglycemia  - Administer ordered medications to maintain glucose within target range  - Assess nutritional intake and initiate nutrition service referral as needed  Outcome: Progressing     Problem: MUSCULOSKELETAL - ADULT  Goal: Maintain or return mobility to safest level of function  Description  INTERVENTIONS:  - Assess patient's ability to carry out ADLs; assess patient's baseline for ADL function and identify physical deficits which impact ability to perform ADLs (bathing, care of mouth/teeth, toileting, grooming, dressing, etc )  - Assess/evaluate cause of self-care deficits   - Assess range of motion  - Assess patient's mobility  - Assess patient's need for assistive devices and provide as appropriate  - Encourage maximum independence but intervene and supervise when necessary  - Involve family in performance of ADLs  - Assess for home care needs following discharge   - Consider OT consult to assist with ADL evaluation and planning for discharge  - Provide patient education as appropriate  Outcome: Progressing     Problem: PAIN - ADULT  Goal: Verbalizes/displays adequate comfort level or baseline comfort level  Description  Interventions:  - Encourage patient to monitor pain and request assistance  - Assess pain using appropriate pain scale  - Administer analgesics based on type and severity of pain and evaluate response  - Implement non-pharmacological measures as appropriate and evaluate response  - Consider cultural and social influences on pain and pain management  - Notify physician/advanced practitioner if interventions unsuccessful or patient reports new pain  Outcome: Progressing     Problem: DISCHARGE PLANNING  Goal: Discharge to home or other facility with appropriate resources  Description  INTERVENTIONS:  - Identify barriers to discharge w/patient and caregiver  - Arrange for needed discharge resources and transportation as appropriate  - Identify discharge learning needs (meds, wound care, etc )  - Arrange for interpretive services to assist at discharge as needed  - Refer to Case Management Department for coordinating discharge planning if the patient needs post-hospital services based on physician/advanced practitioner order or complex needs related to functional status, cognitive ability, or social support system  Outcome: Progressing     Problem: Knowledge Deficit  Goal: Patient/family/caregiver demonstrates understanding of disease process, treatment plan, medications, and discharge instructions  Description  Complete learning assessment and assess knowledge base    Interventions:  - Provide teaching at level of understanding  - Provide teaching via preferred learning methods  Outcome: Progressing

## 2020-02-16 NOTE — PROGRESS NOTES
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  MRI pending, spoke with pacemaker rep 2/15 will return on Monday to deactivate so patient can get his MRI Monday   Holding Xarelto and aspirin with plan for LP Monday         Atrial fibrillation, chronic  Assessment & Plan  Rate controlled, continue metoprolol   Holding Xarelto for lumbar puncture    Chronic diastolic CHF (congestive heart failure) ([facility])  Assessment & Plan  Wt Readings from Last 3 Encounters:   02/16/20 (!) 138 kg (304 lb 3 8 oz)   12/23/19 (!) 137 kg (302 lb)   06/17/19 136 kg (299 lb)     Euvolemic, continue home medication  Continue to follow Cardiology as an outpatient        Diabetes mellitus type 2 with neurological manifestations [facility])  Assessment & Plan  Lab Results   Component Value Date    HGBA1C 5 9 09/28/2018       Recent Labs     02/15/20  1122 02/15/20  1559 02/15/20  2034 02/16/20  0708   POCGLU 181* 169* 165* 120       Blood Sugar Average: Last 72 hrs:  (P) 148 3   hold metformin  Accuchecks, SSI       Hypertrophic cardiomyopathy [facility])  Assessment & Plan  Follows with [facility] Cardiology   S/p Biventricular implantable cardioverter-defibrillator (ICD) in situ 12/17/2019   Appears euvolemic  Continue lasix regimen   Continue to follow with Cardiology as an outpatient      Parkinson's disease ([facility] [place] [address] )  Assessment & Plan  Continue Sinemet  PT/OT: home PT/OT    HTN (hypertension)  Assessment & Plan  BP stable  Maintained on lisinopril, toprol XL and lasix   Monitor VS         VTE Pharmacologic Prophylaxis:   Pharmacologic: Pharmacologic VTE Prophylaxis contraindicated due to hodling xarelto for LP MOnday   Mechanical VTE Prophylaxis in Place: Yes    Patient Centered Rounds: I have performed bedside rounds with nursing staff today  Discussions with Specialists or Other Care Team Provider: Neurology     Education and Discussions with Family / Patient: patient     Time Spent for Care: 20 minutes  More than 50% of total time spent on counseling and coordination of care as described above  Current Length of Stay: 3 day(s)    Current Patient Status: Inpatient   Certification Statement: The patient will continue to require additional inpatient hospital stay due to vision changes     Discharge Plan: pending further workup     Code Status: Level 1 - Full Code      Subjective:   Patient states is very tired today  States vision is worse but unable to really quantify, states is more double/fuzzy  No loss of vision  No numbness or tingling  No headache  No other focal neuro deficits  Objective:     Vitals:   Temp (24hrs), Av °F (36 7 °C), Min:97 6 °F (36 4 °C), Max:98 4 °F (36 9 °C)    Temp:  [97 6 °F (36 4 °C)-98 4 °F (36 9 °C)] 97 6 °F (36 4 °C)  HR:  [70-72] 71  Resp:  [18-21] 18  BP: (111-133)/(66-72) 131/72  SpO2:  [93 %-97 %] 94 %  Body mass index is 36 08 kg/m²  Input and Output Summary (last 24 hours):     No intake or output data in the 24 hours ending 20 0909    Physical Exam:     Physical Exam   Constitutional: No distress  HENT:   Head: Normocephalic  Eyes:   Double and blurry vision    Neck: Neck supple  Cardiovascular: Normal rate and regular rhythm  Pulmonary/Chest: Effort normal and breath sounds normal    Abdominal: Soft  Bowel sounds are normal    Neurological: He is alert  No focal neuro deficits    Skin: Skin is warm  Nursing note and vitals reviewed          Additional Data:     Labs:    Results from last 7 days   Lab Units 20  0502   WBC Thousand/uL 6 32   HEMOGLOBIN g/dL 12 9   HEMATOCRIT % 41 0   PLATELETS Thousands/uL 228   NEUTROS PCT % 54   LYMPHS PCT % 34   MONOS PCT % 10   EOS PCT % 1     Results from last 7 days   Lab Units 02/14/20  0502   SODIUM mmol/L 142   POTASSIUM mmol/L 3 7   CHLORIDE mmol/L 104   CO2 mmol/L 28   BUN mg/dL 12   CREATININE mg/dL 0 66   ANION GAP mmol/L 10   CALCIUM mg/dL 9 1   ALBUMIN g/dL 3 6   TOTAL BILIRUBIN mg/dL 0 85   ALK PHOS U/L 105   ALT U/L 15   AST U/L 9   GLUCOSE RANDOM mg/dL 120         Results from last 7 days   Lab Units 02/16/20  0708 02/15/20  2034 02/15/20  1559 02/15/20  1122 02/15/20  0713 02/14/20  2101 02/14/20  1619 02/14/20  1104 02/14/20  0744 02/13/20  2155   POC GLUCOSE mg/dl 120 165* 169* 181* 127 145* 136 171* 121 148*                   * I Have Reviewed All Lab Data Listed Above  * Additional Pertinent Lab Tests Reviewed: No New Labs Available For Today    Imaging:    Imaging Reports Reviewed Today Include:   Imaging Personally Reviewed by Myself Includes:  none    Recent Cultures (last 7 days):           Last 24 Hours Medication List:     Current Facility-Administered Medications:  acetaminophen 650 mg Oral Q6H PRN Eveline Concepcion PA-C   allopurinol 300 mg Oral Daily Marck Moya MD   atorvastatin 40 mg Oral QPM Marck Moya MD   bimatoprost 1 drop Both Eyes HS Marck Moya MD   carbidopa-levodopa 2 tablet Oral TID AC Marck Moya MD   furosemide 10 mg Oral Daily Marck Moya MD   insulin lispro 1-5 Units Subcutaneous 4x Daily (AC & HS) Marck Moya MD   lisinopril 10 mg Oral Daily Marck Moya MD   magnesium oxide 200 mg Oral Daily Marck Moya MD   metoprolol succinate 100 mg Oral Daily Marck Moya MD   polyethylene glycol 17 g Oral Daily PRN Marck Moya MD   potassium chloride 20 mEq Oral Daily Marck Moya MD   senna-docusate sodium 2 tablet Oral BID PRN Marck Moya MD   tamsulosin 0 4 mg Oral BID Marck Moya MD        Today, Patient Was Seen By: Destinee Gardner PA-C    ** Please Note: Dictation voice to text software may have been used in the creation of this document   **

## 2020-02-16 NOTE — PLAN OF CARE
Problem: Potential for Falls  Goal: Patient will remain free of falls  Description  INTERVENTIONS:  - Assess patient frequently for physical needs  -  Identify cognitive and physical deficits and behaviors that affect risk of falls    -  [place] fall precautions as indicated by assessment   - Educate patient/family on patient safety including physical limitations  - Instruct patient to call for assistance with activity based on assessment  - Modify environment to reduce risk of injury  - Consider OT/PT consult to assist with strengthening/mobility  Outcome: Progressing     Problem: METABOLIC, FLUID AND ELECTROLYTES - ADULT  Goal: Glucose maintained within target range  Description  INTERVENTIONS:  - Monitor Blood Glucose as ordered  - Assess for signs and symptoms of hyperglycemia and hypoglycemia  - Administer ordered medications to maintain glucose within target range  - Assess nutritional intake and initiate nutrition service referral as needed  Outcome: Progressing     Problem: MUSCULOSKELETAL - ADULT  Goal: Maintain or return mobility to safest level of function  Description  INTERVENTIONS:  - Assess patient's ability to carry out ADLs; assess patient's baseline for ADL function and identify physical deficits which impact ability to perform ADLs (bathing, care of mouth/teeth, toileting, grooming, dressing, etc )  - Assess/evaluate cause of self-care deficits   - Assess range of motion  - Assess patient's mobility  - Assess patient's need for assistive devices and provide as appropriate  - Encourage maximum independence but intervene and supervise when necessary  - Involve family in performance of ADLs  - Assess for home care needs following discharge   - Consider OT consult to assist with ADL evaluation and planning for discharge  - Provide patient education as appropriate  Outcome: Progressing     Problem: PAIN - ADULT  Goal: Verbalizes/displays adequate comfort level or baseline comfort level  Description  Interventions:  - Encourage patient to monitor pain and request assistance  - Assess pain using appropriate pain scale  - Administer analgesics based on type and severity of pain and evaluate response  - Implement non-pharmacological measures as appropriate and evaluate response  - Consider cultural and social influences on pain and pain management  - Notify physician/advanced practitioner if interventions unsuccessful or patient reports new pain  Outcome: Progressing     Problem: DISCHARGE PLANNING  Goal: Discharge to home or other facility with appropriate resources  Description  INTERVENTIONS:  - Identify barriers to discharge w/patient and caregiver  - Arrange for needed discharge resources and transportation as appropriate  - Identify discharge learning needs (meds, wound care, etc )  - Arrange for interpretive services to assist at discharge as needed  - Refer to Case Management Department for coordinating discharge planning if the patient needs post-hospital services based on physician/advanced practitioner order or complex needs related to functional status, cognitive ability, or social support system  Outcome: Progressing     Problem: Knowledge Deficit  Goal: Patient/family/caregiver demonstrates understanding of disease process, treatment plan, medications, and discharge instructions  Description  Complete learning assessment and assess knowledge base    Interventions:  - Provide teaching at level of understanding  - Provide teaching via preferred learning methods  Outcome: Progressing

## 2020-02-17 ENCOUNTER — APPOINTMENT (INPATIENT)
Dept: RADIOLOGY | Facility: HOSPITAL | Age: 70
DRG: 123 | End: 2020-02-17
Payer: MEDICARE

## 2020-02-17 LAB
ANION GAP SERPL CALCULATED.3IONS-SCNC: 9 MMOL/L (ref 4–13)
APPEARANCE CSF: NORMAL
BUN SERPL-MCNC: 12 MG/DL (ref 5–25)
C GATTII+NEOFOR DNA CSF QL NAA+NON-PROBE: NOT DETECTED
CALCIUM SERPL-MCNC: 9.1 MG/DL (ref 8.3–10.1)
CHLORIDE SERPL-SCNC: 104 MMOL/L (ref 100–108)
CMV DNA CSF QL NAA+NON-PROBE: NOT DETECTED
CO2 SERPL-SCNC: 29 MMOL/L (ref 21–32)
CREAT SERPL-MCNC: 0.64 MG/DL (ref 0.6–1.3)
E COLI K1 DNA CSF QL NAA+NON-PROBE: NOT DETECTED
ERYTHROCYTE [DISTWIDTH] IN BLOOD BY AUTOMATED COUNT: 13.2 % (ref 11.6–15.1)
EV RNA CSF QL NAA+NON-PROBE: NOT DETECTED
GFR SERPL CREATININE-BSD FRML MDRD: 98 ML/MIN/1.73SQ M
GLUCOSE CSF-MCNC: 87 MG/DL (ref 50–80)
GLUCOSE SERPL-MCNC: 112 MG/DL (ref 65–140)
GLUCOSE SERPL-MCNC: 115 MG/DL (ref 65–140)
GLUCOSE SERPL-MCNC: 159 MG/DL (ref 65–140)
GLUCOSE SERPL-MCNC: 168 MG/DL (ref 65–140)
GLUCOSE SERPL-MCNC: 200 MG/DL (ref 65–140)
GP B STREP DNA CSF QL NAA+NON-PROBE: NOT DETECTED
GRAM STN SPEC: NORMAL
HAEM INFLU DNA CSF QL NAA+NON-PROBE: NOT DETECTED
HCT VFR BLD AUTO: 42 % (ref 36.5–49.3)
HGB BLD-MCNC: 13.1 G/DL (ref 12–17)
HHV6 DNA CSF QL NAA+NON-PROBE: NOT DETECTED
HSV1 DNA CSF QL NAA+NON-PROBE: NOT DETECTED
HSV2 DNA CSF QL NAA+NON-PROBE: NOT DETECTED
INR PPP: 1.13 (ref 0.84–1.19)
L MONOCYTOG DNA CSF QL NAA+NON-PROBE: NOT DETECTED
MCH RBC QN AUTO: 29.4 PG (ref 26.8–34.3)
MCHC RBC AUTO-ENTMCNC: 31.2 G/DL (ref 31.4–37.4)
MCV RBC AUTO: 94 FL (ref 82–98)
N MEN DNA CSF QL NAA+NON-PROBE: NOT DETECTED
PARECHOVIRUS A RNA CSF QL NAA+NON-PROBE: NOT DETECTED
PLATELET # BLD AUTO: 199 THOUSANDS/UL (ref 149–390)
PMV BLD AUTO: 10.1 FL (ref 8.9–12.7)
POTASSIUM SERPL-SCNC: 3.9 MMOL/L (ref 3.5–5.3)
PROT CSF-MCNC: 64 MG/DL (ref 15–45)
PROTHROMBIN TIME: 14.7 SECONDS (ref 11.6–14.5)
RBC # BLD AUTO: 4.45 MILLION/UL (ref 3.88–5.62)
RBC # CSF MANUAL: 0 UL (ref 0–10)
S PNEUM DNA CSF QL NAA+NON-PROBE: NOT DETECTED
SODIUM SERPL-SCNC: 142 MMOL/L (ref 136–145)
TOTAL CELLS COUNTED BLD: NO
TUBE # CSF: 4
VZV DNA CSF QL NAA+NON-PROBE: NOT DETECTED
WBC # BLD AUTO: 7.18 THOUSAND/UL (ref 4.31–10.16)
WBC # CSF AUTO: 0 /UL (ref 0–5)

## 2020-02-17 PROCEDURE — 99232 SBSQ HOSP IP/OBS MODERATE 35: CPT | Performed by: PHYSICIAN ASSISTANT

## 2020-02-17 PROCEDURE — 82948 REAGENT STRIP/BLOOD GLUCOSE: CPT

## 2020-02-17 PROCEDURE — 89051 BODY FLUID CELL COUNT: CPT | Performed by: PHYSICIAN ASSISTANT

## 2020-02-17 PROCEDURE — 009U3ZX DRAINAGE OF SPINAL CANAL, PERCUTANEOUS APPROACH, DIAGNOSTIC: ICD-10-PCS | Performed by: RADIOLOGY

## 2020-02-17 PROCEDURE — 82945 GLUCOSE OTHER FLUID: CPT | Performed by: PHYSICIAN ASSISTANT

## 2020-02-17 PROCEDURE — 84157 ASSAY OF PROTEIN OTHER: CPT | Performed by: PHYSICIAN ASSISTANT

## 2020-02-17 PROCEDURE — 85027 COMPLETE CBC AUTOMATED: CPT | Performed by: PHYSICIAN ASSISTANT

## 2020-02-17 PROCEDURE — 87070 CULTURE OTHR SPECIMN AEROBIC: CPT | Performed by: PHYSICIAN ASSISTANT

## 2020-02-17 PROCEDURE — 88108 CYTOPATH CONCENTRATE TECH: CPT | Performed by: PATHOLOGY

## 2020-02-17 PROCEDURE — 85610 PROTHROMBIN TIME: CPT | Performed by: PHYSICIAN ASSISTANT

## 2020-02-17 PROCEDURE — 62328 DX LMBR SPI PNXR W/FLUOR/CT: CPT | Performed by: RADIOLOGY

## 2020-02-17 PROCEDURE — 89050 BODY FLUID CELL COUNT: CPT | Performed by: PHYSICIAN ASSISTANT

## 2020-02-17 PROCEDURE — 80048 BASIC METABOLIC PNL TOTAL CA: CPT | Performed by: PHYSICIAN ASSISTANT

## 2020-02-17 PROCEDURE — 87483 CNS DNA AMP PROBE TYPE 12-25: CPT | Performed by: PHYSICIAN ASSISTANT

## 2020-02-17 PROCEDURE — 62328 DX LMBR SPI PNXR W/FLUOR/CT: CPT

## 2020-02-17 RX ORDER — LIDOCAINE WITH 8.4% SOD BICARB 0.9%(10ML)
SYRINGE (ML) INJECTION CODE/TRAUMA/SEDATION MEDICATION
Status: DISCONTINUED | OUTPATIENT
Start: 2020-02-17 | End: 2020-02-19 | Stop reason: HOSPADM

## 2020-02-17 RX ADMIN — TAMSULOSIN HYDROCHLORIDE 0.4 MG: 0.4 CAPSULE ORAL at 17:40

## 2020-02-17 RX ADMIN — INSULIN LISPRO 1 UNITS: 100 INJECTION, SOLUTION INTRAVENOUS; SUBCUTANEOUS at 17:40

## 2020-02-17 RX ADMIN — Medication 4 ML: at 11:27

## 2020-02-17 RX ADMIN — POTASSIUM CHLORIDE 20 MEQ: 1500 TABLET, EXTENDED RELEASE ORAL at 08:19

## 2020-02-17 RX ADMIN — CARBIDOPA AND LEVODOPA 2 TABLET: 25; 100 TABLET ORAL at 06:34

## 2020-02-17 RX ADMIN — MAGNESIUM GLUCONATE 500 MG ORAL TABLET 200 MG: 500 TABLET ORAL at 08:19

## 2020-02-17 RX ADMIN — FLUTICASONE PROPIONATE 1 SPRAY: 50 SPRAY, METERED NASAL at 08:18

## 2020-02-17 RX ADMIN — OXYCODONE HYDROCHLORIDE 5 MG: 5 TABLET ORAL at 02:49

## 2020-02-17 RX ADMIN — ATORVASTATIN CALCIUM 40 MG: 40 TABLET, FILM COATED ORAL at 17:40

## 2020-02-17 RX ADMIN — CARBIDOPA AND LEVODOPA 2 TABLET: 25; 100 TABLET ORAL at 12:19

## 2020-02-17 RX ADMIN — ALLOPURINOL 300 MG: 100 TABLET ORAL at 08:18

## 2020-02-17 RX ADMIN — TAMSULOSIN HYDROCHLORIDE 0.4 MG: 0.4 CAPSULE ORAL at 08:19

## 2020-02-17 RX ADMIN — INSULIN LISPRO 1 UNITS: 100 INJECTION, SOLUTION INTRAVENOUS; SUBCUTANEOUS at 13:19

## 2020-02-17 RX ADMIN — INSULIN LISPRO 1 UNITS: 100 INJECTION, SOLUTION INTRAVENOUS; SUBCUTANEOUS at 21:56

## 2020-02-17 RX ADMIN — BIMATOPROST 1 DROP: 0.1 SOLUTION/ DROPS OPHTHALMIC at 21:56

## 2020-02-17 RX ADMIN — CARBIDOPA AND LEVODOPA 2 TABLET: 25; 100 TABLET ORAL at 16:25

## 2020-02-17 RX ADMIN — OXYCODONE HYDROCHLORIDE 5 MG: 5 TABLET ORAL at 21:57

## 2020-02-17 NOTE — SOCIAL WORK
CM arranged a BLS transport with [facility] to take pt to [facility] MRI at [facility] tomorrow

## 2020-02-17 NOTE — ASSESSMENT & PLAN NOTE
Wt Readings from Last 3 Encounters:   02/17/20 (!) 138 kg (304 lb 3 8 oz)   12/23/19 (!) 137 kg (302 lb)   06/17/19 136 kg (299 lb)     Euvolemic, continue home medication    Continue to follow Cardiology as an outpatient

## 2020-02-17 NOTE — PLAN OF CARE
Problem: Potential for Falls  Goal: Patient will remain free of falls  Description  INTERVENTIONS:  - Assess patient frequently for physical needs  -  Identify cognitive and physical deficits and behaviors that affect risk of falls    -  [place] fall precautions as indicated by assessment   - Educate patient/family on patient safety including physical limitations  - Instruct patient to call for assistance with activity based on assessment  - Modify environment to reduce risk of injury  - Consider OT/PT consult to assist with strengthening/mobility  Outcome: Progressing     Problem: METABOLIC, FLUID AND ELECTROLYTES - ADULT  Goal: Glucose maintained within target range  Description  INTERVENTIONS:  - Monitor Blood Glucose as ordered  - Assess for signs and symptoms of hyperglycemia and hypoglycemia  - Administer ordered medications to maintain glucose within target range  - Assess nutritional intake and initiate nutrition service referral as needed  Outcome: Progressing     Problem: MUSCULOSKELETAL - ADULT  Goal: Maintain or return mobility to safest level of function  Description  INTERVENTIONS:  - Assess patient's ability to carry out ADLs; assess patient's baseline for ADL function and identify physical deficits which impact ability to perform ADLs (bathing, care of mouth/teeth, toileting, grooming, dressing, etc )  - Assess/evaluate cause of self-care deficits   - Assess range of motion  - Assess patient's mobility  - Assess patient's need for assistive devices and provide as appropriate  - Encourage maximum independence but intervene and supervise when necessary  - Involve family in performance of ADLs  - Assess for home care needs following discharge   - Consider OT consult to assist with ADL evaluation and planning for discharge  - Provide patient education as appropriate  Outcome: Progressing     Problem: PAIN - ADULT  Goal: Verbalizes/displays adequate comfort level or baseline comfort level  Description  Interventions:  - Encourage patient to monitor pain and request assistance  - Assess pain using appropriate pain scale  - Administer analgesics based on type and severity of pain and evaluate response  - Implement non-pharmacological measures as appropriate and evaluate response  - Consider cultural and social influences on pain and pain management  - Notify physician/advanced practitioner if interventions unsuccessful or patient reports new pain  Outcome: Progressing     Problem: DISCHARGE PLANNING  Goal: Discharge to home or other facility with appropriate resources  Description  INTERVENTIONS:  - Identify barriers to discharge w/patient and caregiver  - Arrange for needed discharge resources and transportation as appropriate  - Identify discharge learning needs (meds, wound care, etc )  - Arrange for interpretive services to assist at discharge as needed  - Refer to Case Management Department for coordinating discharge planning if the patient needs post-hospital services based on physician/advanced practitioner order or complex needs related to functional status, cognitive ability, or social support system  Outcome: Progressing     Problem: Knowledge Deficit  Goal: Patient/family/caregiver demonstrates understanding of disease process, treatment plan, medications, and discharge instructions  Description  Complete learning assessment and assess knowledge base    Interventions:  - Provide teaching at level of understanding  - Provide teaching via preferred learning methods  Outcome: Progressing

## 2020-02-17 NOTE — ASSESSMENT & PLAN NOTE
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  · CT head reviewed   · Was informed that patient can not get MRI here and would need to be transported to [facility] to get MRI obtained, called MRI at [facility] this AM, will need to coordinate care with transportation and timing of MRI with [facility] [employer] rep to deactivate pacemaker at [facility]   Will discuss with CM this morning on how to coordinate this care  · Previously had difficulty arranging this outpatient so would like to get this arranged while here   · Holding Xarelto and aspirin with plan for LP Monday

## 2020-02-17 NOTE — PROGRESS NOTES
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  · CT head reviewed   · Was informed that patient can not get MRI here and would need to be transported to [facility] to get MRI obtained, called MRI at [facility] this AM, will need to coordinate care with transportation and timing of MRI with [employer] rep to deactivate pacemaker at [facility]  Will discuss with CM this morning on how to coordinate this care  · Previously had difficulty arranging this outpatient so would like to get this arranged while here   · Holding Xarelto and aspirin with plan for LP Monday         Atrial fibrillation, chronic  Assessment & Plan  Rate controlled, continue metoprolol   Holding Xarelto for lumbar puncture    Chronic diastolic CHF (congestive heart failure) (HCC)  Assessment & Plan  Wt Readings from Last 3 Encounters:   02/17/20 (!) 138 kg (304 lb 3 8 oz)   12/23/19 (!) 137 kg (302 lb)   06/17/19 136 kg (299 lb)     Euvolemic, continue home medication  Continue to follow Cardiology as an outpatient        Diabetes mellitus type 2 with neurological manifestations [facility])  Assessment & Plan  Lab Results   Component Value Date    HGBA1C 5 9 09/28/2018       Recent Labs     02/16/20  0708 02/16/20  1057 02/16/20  1623 02/16/20  2048   POCGLU 120 151* 93 151*       Blood Sugar Average: Last 72 hrs:  (P) 966 [id number]   hold metformin    Accuchecks, SSI       Hypertrophic cardiomyopathy [facility])  Assessment & Plan  Follows with [facility] Cardiology   S/p Biventricular implantable cardioverter-defibrillator (ICD) in situ 2019   Appears euvolemic  Continue lasix regimen   Continue to follow with Cardiology as an outpatient  Parkinson's disease ([facility] [address] )  Assessment & Plan  Continue Sinemet  PT/OT: home PT/OT    HTN (hypertension)  Assessment & Plan  BP stable  Maintained on lisinopril, toprol XL and lasix   Monitor VS         VTE Pharmacologic Prophylaxis:   Pharmacologic: Pharmacologic VTE Prophylaxis contraindicated due to holding Xarelto for LP today   Mechanical VTE Prophylaxis in Place: Yes    Patient Centered Rounds: I have performed bedside rounds with nursing staff today  Discussions with Specialists or Other Care Team Provider: Neurology, MRI at [place], will touch base with CM once in house     Education and Discussions with Family / Patient: patient     Time Spent for Care: 20 minutes  More than 50% of total time spent on counseling and coordination of care as described above  Current Length of Stay: 4 day(s)    Current Patient Status: Inpatient   Certification Statement: The patient will continue to require additional inpatient hospital stay due to vision changes     Discharge Plan: pending workup     Code Status: Level 1 - Full Code      Subjective:   Patient sitting in chair  No acute complaints this AM  No changes or worsening in vision  Objective:     Vitals:   Temp (24hrs), Av 7 °F (36 5 °C), Min:97 6 °F (36 4 °C), Max:97 7 °F (36 5 °C)    Temp:  [97 6 °F (36 4 °C)-97 7 °F (36 5 °C)] 97 7 °F (36 5 °C)  HR:  [70-72] 72  Resp:  [18-20] 20  BP: ()/(63-69) 111/69  SpO2:  [95 %-98 %] 98 %  Body mass index is 36 08 kg/m²  Input and Output Summary (last 24 hours):     No intake or output data in the 24 hours ending 20 0724    Physical Exam:     Physical Exam   Constitutional: No distress  Eyes:   Double and blurry vision    Neck: Neck supple  Cardiovascular: Normal rate and regular rhythm     Pulmonary/Chest: Effort normal and breath sounds normal    Abdominal: Soft  Bowel sounds are normal    Musculoskeletal: He exhibits edema (trace)  Neurological: He is alert  Skin: Skin is warm  Psychiatric: He has a normal mood and affect  Nursing note and vitals reviewed  Additional Data:     Labs:    Results from last 7 days   Lab Units 02/17/20  0450 02/14/20  0502   WBC Thousand/uL 7 18 6 32   HEMOGLOBIN g/dL 13 1 12 9   HEMATOCRIT % 42 0 41 0   PLATELETS Thousands/uL 199 228   NEUTROS PCT %  --  54   LYMPHS PCT %  --  34   MONOS PCT %  --  10   EOS PCT %  --  1     Results from last 7 days   Lab Units 02/17/20  0450 02/14/20  0502   SODIUM mmol/L 142 142   POTASSIUM mmol/L 3 9 3 7   CHLORIDE mmol/L 104 104   CO2 mmol/L 29 28   BUN mg/dL 12 12   CREATININE mg/dL 0 64 0 66   ANION GAP mmol/L 9 10   CALCIUM mg/dL 9 1 9 1   ALBUMIN g/dL  --  3 6   TOTAL BILIRUBIN mg/dL  --  0 85   ALK PHOS U/L  --  105   ALT U/L  --  15   AST U/L  --  9   GLUCOSE RANDOM mg/dL 112 120     Results from last 7 days   Lab Units 02/17/20  0450   INR  1 13     Results from last 7 days   Lab Units 02/16/20  2048 02/16/20  1623 02/16/20  1057 02/16/20  0708 02/15/20  2034 02/15/20  1559 02/15/20  1122 02/15/20  0713 02/14/20  2101 02/14/20  1619 02/14/20  1104 02/14/20  0744   POC GLUCOSE mg/dl 151* 93 151* 120 165* 169* 181* 127 145* 136 171* 121                   * I Have Reviewed All Lab Data Listed Above  * Additional Pertinent Lab Tests Reviewed:  All Labs Within Last 24 Hours Reviewed    Imaging:    Imaging Reports Reviewed Today Include:   Imaging Personally Reviewed by Myself Includes:  Reviewed CT head     Recent Cultures (last 7 days):           Last 24 Hours Medication List:     Current Facility-Administered Medications:  acetaminophen 650 mg Oral Q6H PRN Eveline Concepcion PA-C   allopurinol 300 mg Oral Daily Kathleen Griggs MD   atorvastatin 40 mg Oral QPM Kathleen Griggs MD   bimatoprost 1 drop Both Eyes HS Kathleen Griggs MD   carbidopa-levodopa 2 tablet Oral TID [facility] Sona Winter MD   fluticasone 1 spray Each Nare Daily Mary Lopez PA-C   furosemide 10 mg Oral Daily Jennifer Arredondo MD   insulin lispro 1-5 Units Subcutaneous 4x Daily (AC & HS) Jennifer Arredondo MD   iohexol 85 mL Intravenous Once in imaging Acosta Escobar DO   lisinopril 10 mg Oral Daily Jennifer Arredondo MD   magnesium oxide 200 mg Oral Daily Jennifer Arredondo MD   metoprolol succinate 100 mg Oral Daily Jennifer Arredondo MD   oxyCODONE 5 mg Oral Q4H PRN Mary Lopez PA-C   polyethylene glycol 17 g Oral Daily PRN Jennifer Arredondo MD   potassium chloride 20 mEq Oral Daily Jennifer Arredondo MD   senna-docusate sodium 2 tablet Oral BID PRN Jennifer Arredondo MD   tamsulosin 0 4 mg Oral BID Jennifer Arredondo MD        Today, Patient Was Seen By: Aydee Rivera PA-C    ** Please Note: Dictation voice to text software may have been used in the creation of this document   **

## 2020-02-17 NOTE — PLAN OF CARE
Problem: Potential for Falls  Goal: Patient will remain free of falls  Description  INTERVENTIONS:  - Assess patient frequently for physical needs  -  Identify cognitive and physical deficits and behaviors that affect risk of falls    -  [place] fall precautions as indicated by assessment   - Educate patient/family on patient safety including physical limitations  - Instruct patient to call for assistance with activity based on assessment  - Modify environment to reduce risk of injury  - Consider OT/PT consult to assist with strengthening/mobility  Outcome: Progressing     Problem: METABOLIC, FLUID AND ELECTROLYTES - ADULT  Goal: Glucose maintained within target range  Description  INTERVENTIONS:  - Monitor Blood Glucose as ordered  - Assess for signs and symptoms of hyperglycemia and hypoglycemia  - Administer ordered medications to maintain glucose within target range  - Assess nutritional intake and initiate nutrition service referral as needed  Outcome: Progressing     Problem: MUSCULOSKELETAL - ADULT  Goal: Maintain or return mobility to safest level of function  Description  INTERVENTIONS:  - Assess patient's ability to carry out ADLs; assess patient's baseline for ADL function and identify physical deficits which impact ability to perform ADLs (bathing, care of mouth/teeth, toileting, grooming, dressing, etc )  - Assess/evaluate cause of self-care deficits   - Assess range of motion  - Assess patient's mobility  - Assess patient's need for assistive devices and provide as appropriate  - Encourage maximum independence but intervene and supervise when necessary  - Involve family in performance of ADLs  - Assess for home care needs following discharge   - Consider OT consult to assist with ADL evaluation and planning for discharge  - Provide patient education as appropriate  Outcome: Progressing     Problem: PAIN - ADULT  Goal: Verbalizes/displays adequate comfort level or baseline comfort level  Description  Interventions:  - Encourage patient to monitor pain and request assistance  - Assess pain using appropriate pain scale  - Administer analgesics based on type and severity of pain and evaluate response  - Implement non-pharmacological measures as appropriate and evaluate response  - Consider cultural and social influences on pain and pain management  - Notify physician/advanced practitioner if interventions unsuccessful or patient reports new pain  Outcome: Progressing     Problem: DISCHARGE PLANNING  Goal: Discharge to home or other facility with appropriate resources  Description  INTERVENTIONS:  - Identify barriers to discharge w/patient and caregiver  - Arrange for needed discharge resources and transportation as appropriate  - Identify discharge learning needs (meds, wound care, etc )  - Arrange for interpretive services to assist at discharge as needed  - Refer to Case Management Department for coordinating discharge planning if the patient needs post-hospital services based on physician/advanced practitioner order or complex needs related to functional status, cognitive ability, or social support system  Outcome: Progressing     Problem: Knowledge Deficit  Goal: Patient/family/caregiver demonstrates understanding of disease process, treatment plan, medications, and discharge instructions  Description  Complete learning assessment and assess knowledge base    Interventions:  - Provide teaching at level of understanding  - Provide teaching via preferred learning methods  Outcome: Progressing

## 2020-02-17 NOTE — OCCUPATIONAL THERAPY NOTE
Occupational Therapy         Patient Name: Ruel Paget  UTWPY'B Date: 2/17/2020      Attempted to see patient this am and pt off floor at IR  Will cancel and continue to follow as appropriate      Luis Marcelo MS, OTR/L

## 2020-02-17 NOTE — PROCEDURES
Procedures  Interventional Radiology Procedure Note    PATIENT NAME: Jakob Dover  : 1950  MRN: [id number]    Procedure: Image guided lumbar puncture    Estimated Blood Loss: none     Findings: Fluoroscopic guidance was used  L3/4 puncture with 20g spinal needle  8ml clear fluid fluid drained  Very sluggish drainage, unable to drain any more than 8 cc PACS dictation to follow       Specimens: as ordered     Complications:  None    Anesthesia: Murali Parks MD     Date: 2020  Time: 11:50 AM

## 2020-02-17 NOTE — ASSESSMENT & PLAN NOTE
Lab Results   Component Value Date    HGBA1C 5 9 09/28/2018       Recent Labs     02/16/20  0708 02/16/20  1057 02/16/20  1623 02/16/20  2048   POCGLU 120 151* 93 151*       Blood Sugar Average: Last 72 hrs:  (P) 197 [id number]   hold metformin    Accuchecks, SSI

## 2020-02-17 NOTE — ASSESSMENT & PLAN NOTE
Follows with [facility] Cardiology   S/p Biventricular implantable cardioverter-defibrillator (ICD) in situ 12/17/2019   Appears euvolemic  Continue lasix regimen   Continue to follow with Cardiology as an outpatient

## 2020-02-17 NOTE — PHYSICAL THERAPY NOTE
Physical Therapy Cancellation Note    Attempted to see pt  This AM, however oft the floor at IR for a procedure will cancel and continue to follow as appropriate       Shanti Paul, PTA

## 2020-02-18 ENCOUNTER — APPOINTMENT (OUTPATIENT)
Dept: RADIOLOGY | Facility: HOSPITAL | Age: 70
DRG: 123 | End: 2020-02-18
Payer: MEDICARE

## 2020-02-18 ENCOUNTER — HOSPITAL ENCOUNTER (OUTPATIENT)
Dept: RADIOLOGY | Facility: HOSPITAL | Age: 70
Discharge: HOME | DRG: 123 | End: 2020-02-18
Attending: PSYCHIATRY & NEUROLOGY
Payer: MEDICARE

## 2020-02-18 ENCOUNTER — TRANSCRIBE ORDERS (OUTPATIENT)
Dept: RADIOLOGY | Facility: HOSPITAL | Age: 70
End: 2020-02-18

## 2020-02-18 LAB
GLUCOSE SERPL-MCNC: 115 MG/DL (ref 65–140)
GLUCOSE SERPL-MCNC: 118 MG/DL (ref 65–140)
GLUCOSE SERPL-MCNC: 127 MG/DL (ref 65–140)
GLUCOSE SERPL-MCNC: 190 MG/DL (ref 65–140)

## 2020-02-18 PROCEDURE — 70553 MRI BRAIN STEM W/O & W/DYE: CPT

## 2020-02-18 PROCEDURE — 82948 REAGENT STRIP/BLOOD GLUCOSE: CPT

## 2020-02-18 PROCEDURE — 99232 SBSQ HOSP IP/OBS MODERATE 35: CPT | Performed by: PHYSICIAN ASSISTANT

## 2020-02-18 PROCEDURE — 70543 MRI ORBT/FAC/NCK W/O &W/DYE: CPT

## 2020-02-18 PROCEDURE — A9585 GADOBUTROL INJECTION: HCPCS | Performed by: PHYSICIAN ASSISTANT

## 2020-02-18 RX ORDER — ASPIRIN 81 MG/1
81 TABLET, CHEWABLE ORAL DAILY
Status: DISCONTINUED | OUTPATIENT
Start: 2020-02-19 | End: 2020-02-19 | Stop reason: HOSPADM

## 2020-02-18 RX ADMIN — POTASSIUM CHLORIDE 20 MEQ: 1500 TABLET, EXTENDED RELEASE ORAL at 07:42

## 2020-02-18 RX ADMIN — CARBIDOPA AND LEVODOPA 2 TABLET: 25; 100 TABLET ORAL at 12:17

## 2020-02-18 RX ADMIN — FUROSEMIDE 10 MG: 20 TABLET ORAL at 08:20

## 2020-02-18 RX ADMIN — CARBIDOPA AND LEVODOPA 2 TABLET: 25; 100 TABLET ORAL at 06:15

## 2020-02-18 RX ADMIN — METOPROLOL SUCCINATE 100 MG: 50 TABLET, EXTENDED RELEASE ORAL at 07:42

## 2020-02-18 RX ADMIN — TAMSULOSIN HYDROCHLORIDE 0.4 MG: 0.4 CAPSULE ORAL at 07:42

## 2020-02-18 RX ADMIN — OXYCODONE HYDROCHLORIDE 5 MG: 5 TABLET ORAL at 16:49

## 2020-02-18 RX ADMIN — BIMATOPROST 1 DROP: 0.1 SOLUTION/ DROPS OPHTHALMIC at 21:18

## 2020-02-18 RX ADMIN — LISINOPRIL 10 MG: 10 TABLET ORAL at 07:42

## 2020-02-18 RX ADMIN — GADOBUTROL 13 ML: 604.72 INJECTION INTRAVENOUS at 10:32

## 2020-02-18 RX ADMIN — MAGNESIUM GLUCONATE 500 MG ORAL TABLET 200 MG: 500 TABLET ORAL at 07:41

## 2020-02-18 RX ADMIN — RIVAROXABAN 20 MG: 20 TABLET, FILM COATED ORAL at 12:17

## 2020-02-18 RX ADMIN — INSULIN LISPRO 1 UNITS: 100 INJECTION, SOLUTION INTRAVENOUS; SUBCUTANEOUS at 16:49

## 2020-02-18 RX ADMIN — TAMSULOSIN HYDROCHLORIDE 0.4 MG: 0.4 CAPSULE ORAL at 16:50

## 2020-02-18 RX ADMIN — ALLOPURINOL 300 MG: 100 TABLET ORAL at 07:42

## 2020-02-18 RX ADMIN — CARBIDOPA AND LEVODOPA 2 TABLET: 25; 100 TABLET ORAL at 16:49

## 2020-02-18 RX ADMIN — FLUTICASONE PROPIONATE 1 SPRAY: 50 SPRAY, METERED NASAL at 07:41

## 2020-02-18 RX ADMIN — ATORVASTATIN CALCIUM 40 MG: 40 TABLET, FILM COATED ORAL at 16:50

## 2020-02-18 NOTE — ASSESSMENT & PLAN NOTE
Wt Readings from Last 3 Encounters:   02/18/20 (!) 138 kg (304 lb 14 3 oz)   12/23/19 (!) 137 kg (302 lb)   06/17/19 136 kg (299 lb)     Euvolemic, continue home medication    Continue to follow Cardiology as an outpatient

## 2020-02-18 NOTE — PROGRESS NOTES
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  · CT head reviewed   · LP performed 2/17 by IR with elevated protein noted   · Patient is being transported to [facility] today at 8am for MRI   · Follow up MRI results   · Appreciate Neurology recommendations         Atrial fibrillation, chronic  Assessment & Plan  Rate controlled, continue metoprolol   Restart Xarelto     Chronic diastolic CHF (congestive heart failure) (HCC)  Assessment & Plan  Wt Readings from Last 3 Encounters:   02/18/20 (!) 138 kg (304 lb 14 3 oz)   12/23/19 (!) 137 kg (302 lb)   06/17/19 136 kg (299 lb)     Euvolemic, continue home medication  Continue to follow Cardiology as an outpatient        Diabetes mellitus type 2 with neurological manifestations [facility])  Assessment & Plan  Lab Results   Component Value Date    HGBA1C 5 9 09/28/2018       Recent Labs     02/17/20  0743 02/17/20  1203 02/17/20  1610 02/17/20  2100   POCGLU 115 159* 168* 200*       Blood Sugar Average: Last 72 hrs:  (P) 189 [id number]   hold metformin  Accuchecks, SSI       Hypertrophic cardiomyopathy [facility])  Assessment & Plan  Follows with [facility] Cardiology   S/p Biventricular implantable cardioverter-defibrillator (ICD) in situ 12/17/2019   Appears euvolemic  Continue lasix regimen   Continue to follow with Cardiology as an outpatient      Parkinson's disease ([facility] [address] )  Assessment & Plan  Continue Sinemet  PT/OT: home PT/OT    HTN (hypertension)  Assessment & Plan  Maintained on lisinopril, toprol XL and lasix   Monitor VS         VTE Pharmacologic Prophylaxis:   Pharmacologic: Rivaroxaban (Xarelto)  Mechanical VTE Prophylaxis in Place: Yes    Patient Centered Rounds: I have performed bedside rounds with nursing staff today  Discussions with Specialists or Other Care Team Provider: will touch base withNeurology     Education and Discussions with Family / Patient: patient, left wife voice message, spoke to daughter on the phone and updated on plan of care    Time Spent for Care: 20 minutes  More than 50% of total time spent on counseling and coordination of care as described above  Current Length of Stay: 5 day(s)    Current Patient Status: Inpatient   Certification Statement: The patient will continue to require additional inpatient hospital stay due to Vision changes, pending MRI    Discharge Plan:  Pending clinical course    Code Status: Level 1 - Full Code      Subjective:   Patient doing well this morning  He is scheduled to go to [facility] for his MRI today at 8:00 a m  His family was made aware this and updated on plan of care  He denies any acute changes overnight  Objective:     Vitals:   Temp (24hrs), Av 5 °F (36 4 °C), Min:97 4 °F (36 3 °C), Max:97 7 °F (36 5 °C)    Temp:  [97 4 °F (36 3 °C)-97 7 °F (36 5 °C)] 97 5 °F (36 4 °C)  HR:  [69-70] 70  Resp:  [18] 18  BP: ()/(61-68) 131/68  SpO2:  [96 %-99 %] 99 %  Body mass index is 36 16 kg/m²  Input and Output Summary (last 24 hours): Intake/Output Summary (Last 24 hours) at 2020 0720  Last data filed at 2020 1259  Gross per 24 hour   Intake    Output 208 ml   Net -208 ml       Physical Exam:     Physical Exam   Constitutional: No distress  HENT:   Head: Normocephalic  Eyes:   Blurry and double vision   Neck: Neck supple  Cardiovascular: Normal rate and regular rhythm  Pulmonary/Chest: Effort normal and breath sounds normal    Diminished throughout   Abdominal: Soft  Bowel sounds are normal    Neurological: He is alert  Skin: Skin is warm  Psychiatric: He has a normal mood and affect     Nursing note and vitals reviewed  Additional Data:     Labs:    Results from last 7 days   Lab Units 02/17/20  0450 02/14/20  0502   WBC Thousand/uL 7 18 6 32   HEMOGLOBIN g/dL 13 1 12 9   HEMATOCRIT % 42 0 41 0   PLATELETS Thousands/uL 199 228   NEUTROS PCT %  --  54   LYMPHS PCT %  --  34   MONOS PCT %  --  10   EOS PCT %  --  1     Results from last 7 days   Lab Units 02/17/20  0450 02/14/20  0502   SODIUM mmol/L 142 142   POTASSIUM mmol/L 3 9 3 7   CHLORIDE mmol/L 104 104   CO2 mmol/L 29 28   BUN mg/dL 12 12   CREATININE mg/dL 0 64 0 66   ANION GAP mmol/L 9 10   CALCIUM mg/dL 9 1 9 1   ALBUMIN g/dL  --  3 6   TOTAL BILIRUBIN mg/dL  --  0 85   ALK PHOS U/L  --  105   ALT U/L  --  15   AST U/L  --  9   GLUCOSE RANDOM mg/dL 112 120     Results from last 7 days   Lab Units 02/17/20  0450   INR  1 13     Results from last 7 days   Lab Units 02/17/20  2100 02/17/20  1610 02/17/20  1203 02/17/20  0743 02/16/20  2048 02/16/20  1623 02/16/20  1057 02/16/20  0708 02/15/20  2034 02/15/20  1559 02/15/20  1122 02/15/20  0713   POC GLUCOSE mg/dl 200* 168* 159* 115 151* 93 151* 120 165* 169* 181* 127                   * I Have Reviewed All Lab Data Listed Above    * Additional Pertinent Lab Tests Reviewed: No New Labs Available For Today    Imaging:    Imaging Reports Reviewed Today Include:   Imaging Personally Reviewed by Myself Includes:  Pending MRI     Recent Cultures (last 7 days):     Results from last 7 days   Lab Units 02/17/20  1215   GRAM STAIN RESULT  No Polys or Bacteria seen       Last 24 Hours Medication List:     Current Facility-Administered Medications:  acetaminophen 650 mg Oral Q6H PRN Eveline Concepcion PA-C   allopurinol 300 mg Oral Daily Riccardo Reyes MD   [START ON 2/19/2020] aspirin 81 mg Oral Daily Mary Lopez PA-C   atorvastatin 40 mg Oral QPM Riccardo Reyes MD   bimatoprost 1 drop Both Eyes HS Riccardo Reyes MD   carbidopa-levodopa 2 tablet Oral TID MILES Reyes MD   fluticasone 1 spray Each Nare Daily Nilda Rueda, ARACELI   furosemide 10 mg Oral Daily Brandie Shankar MD   insulin lispro 1-5 Units Subcutaneous 4x Daily (AC & HS) Brandie Shankar MD   iohexol 85 mL Intravenous Once in imaging Acosta Escobar,    lidocaine 1% buffered   Code/Trauma/Sedation Alvaro Hooper MD   lisinopril 10 mg Oral Daily Brandie Shankar MD   magnesium oxide 200 mg Oral Daily Brandie Shankar MD   metoprolol succinate 100 mg Oral Daily Brandie Shankar MD   oxyCODONE 5 mg Oral Q4H PRN Princess Mildred PA-C   polyethylene glycol 17 g Oral Daily PRN Brandie Shankar MD   potassium chloride 20 mEq Oral Daily Brandie Shankar MD   [START ON 2/19/2020] rivaroxaban 20 mg Oral Daily With Breakfast Mary Lopez PA-C   senna-docusate sodium 2 tablet Oral BID PRN Brandie Shankar MD   tamsulosin 0 4 mg Oral BID Brandie Shankar MD        Today, Patient Was Seen By: Princess Mildred PA-C    ** Please Note: Dictation voice to text software may have been used in the creation of this document   **

## 2020-02-18 NOTE — PLAN OF CARE
Problem: Potential for Falls  Goal: Patient will remain free of falls  Description  INTERVENTIONS:  - Assess patient frequently for physical needs  -  Identify cognitive and physical deficits and behaviors that affect risk of falls    -  [place] fall precautions as indicated by assessment   - Educate patient/family on patient safety including physical limitations  - Instruct patient to call for assistance with activity based on assessment  - Modify environment to reduce risk of injury  - Consider OT/PT consult to assist with strengthening/mobility  Outcome: Progressing     Problem: METABOLIC, FLUID AND ELECTROLYTES - ADULT  Goal: Glucose maintained within target range  Description  INTERVENTIONS:  - Monitor Blood Glucose as ordered  - Assess for signs and symptoms of hyperglycemia and hypoglycemia  - Administer ordered medications to maintain glucose within target range  - Assess nutritional intake and initiate nutrition service referral as needed  Outcome: Progressing     Problem: MUSCULOSKELETAL - ADULT  Goal: Maintain or return mobility to safest level of function  Description  INTERVENTIONS:  - Assess patient's ability to carry out ADLs; assess patient's baseline for ADL function and identify physical deficits which impact ability to perform ADLs (bathing, care of mouth/teeth, toileting, grooming, dressing, etc )  - Assess/evaluate cause of self-care deficits   - Assess range of motion  - Assess patient's mobility  - Assess patient's need for assistive devices and provide as appropriate  - Encourage maximum independence but intervene and supervise when necessary  - Involve family in performance of ADLs  - Assess for home care needs following discharge   - Consider OT consult to assist with ADL evaluation and planning for discharge  - Provide patient education as appropriate  Outcome: Progressing     Problem: PAIN - ADULT  Goal: Verbalizes/displays adequate comfort level or baseline comfort level  Description  Interventions:  - Encourage patient to monitor pain and request assistance  - Assess pain using appropriate pain scale  - Administer analgesics based on type and severity of pain and evaluate response  - Implement non-pharmacological measures as appropriate and evaluate response  - Consider cultural and social influences on pain and pain management  - Notify physician/advanced practitioner if interventions unsuccessful or patient reports new pain  Outcome: Progressing     Problem: DISCHARGE PLANNING  Goal: Discharge to home or other facility with appropriate resources  Description  INTERVENTIONS:  - Identify barriers to discharge w/patient and caregiver  - Arrange for needed discharge resources and transportation as appropriate  - Identify discharge learning needs (meds, wound care, etc )  - Arrange for interpretive services to assist at discharge as needed  - Refer to Case Management Department for coordinating discharge planning if the patient needs post-hospital services based on physician/advanced practitioner order or complex needs related to functional status, cognitive ability, or social support system  Outcome: Progressing     Problem: Knowledge Deficit  Goal: Patient/family/caregiver demonstrates understanding of disease process, treatment plan, medications, and discharge instructions  Description  Complete learning assessment and assess knowledge base    Interventions:  - Provide teaching at level of understanding  - Provide teaching via preferred learning methods  Outcome: Progressing

## 2020-02-18 NOTE — ASSESSMENT & PLAN NOTE
Lab Results   Component Value Date    HGBA1C 5 9 09/28/2018       Recent Labs     02/17/20  0743 02/17/20  1203 02/17/20  1610 02/17/20  2100   POCGLU 115 159* 168* 200*       Blood Sugar Average: Last 72 hrs:  (P) 227 [id number]   hold metformin    Accuchecks, SSI

## 2020-02-18 NOTE — ASSESSMENT & PLAN NOTE
Follows with [facility]iology   S/p Biventricular implantable cardioverter-defibrillator (ICD) in situ 12/17/2019   Appears euvolemic  Continue lasix regimen   Continue to follow with Cardiology as an outpatient

## 2020-02-18 NOTE — PROGRESS NOTES
Progress Note - Neurology   Jarod Jacob 65 y o  male [id number]  Unit/Bed#: [place] 68 2 /South 2 M*    Assessment:  Jarod Jacob is a 65 y o  male with Parkinson's, AFib on Xarelto, and multiple vascular risk factors, who presented with progressive binocular, horizontally stacked diplopia, worsening gait dysfunction, and worsening confusion  Outpatient ophthalmology was initially concern for CNS lymphoma, but there reports have not yet been obtained  Lumbar puncture performed, with slight elevation in protein, but no other significant findings  Unclear etiology to diplopia  Would request evaluation at [facility], by a neuro ophthalmologist, for further workup  Plan:   - LP completed  · Protein elevated at 64  · CSF flow cytometry and cytology pending   - Medical management and supportive care per primary team  Correction of any metabolic or infectious disturbances    - Patient will need to follow up at [facility], with a neuro-ophthalmologist  (Dr Dima Lopez, Dr Nate Pacheco, or Dr Vera Badillo)  - Follow up with Dr Remington David as scheduled for 3/30  Will attempt to schedule an earlier appointment  The office will call the patient with an earlier appointment if available  He also has an appointment with [facility] Neurology for 4/27 for a second opinion, which Dr Remington David is aware of    - Further recommendations per attending neurologist      Subjective: The patient reports that he is continuing to experience double vision  He denies any new or worsening symptoms since admission  He reports that he is frustrated with not having a clear diagnosis about what's going on  He was advised to follow up with Ophthalmology and the neurology office as an outpatient          Past Medical History:   Diagnosis Date    A-fib [facility])     Arthritis     Balance problems     sometimes    CHF [facility]congest[facility] heart failure) [facility])     CPAP (continuous positive airway pressure) dependence     Diabetes mellitus ([facility] [address] )     Diabetic neuropathy ([address] )     bilat feet    Glaucoma     bilat    Gout     Hard of hearing     doesn t use his hearing aids    Hiatal hernia     History of total right knee replacement     History of total shoulder replacement     left    Hyperlipidemia     Hypertension     Knee pain, left     Knee pain, right     Obesity     Sleep apnea     Use of cane as ambulatory aid     Wears glasses      Past Surgical History:   Procedure Laterality Date    CARDIAC CATHETERIZATION      x2    CARDIAC CATHETERIZATION      COLONOSCOPY      COLONOSCOPY N/A 7/15/2016    Procedure: COLONOSCOPY;  Surgeon: Tushar Maldonado MD;  Location: [facility];   Service:     EYE SURGERY      laser eye surgery    FOOT SURGERY Left     HIATAL HERNIA REPAIR      JOINT REPLACEMENT      left shoulder/right knee    [place] REVISE KNEE JOINT REPLACE,1 PART Right 4/21/2017    Procedure: KNEE REVISION PARTIAL VERSUS COMPLETE REVISION;  Surgeon: Frandy Grayson MD;  Location: [facility];  Service: Orthopedics    WISDOM TOOTH EXTRACTION       Family History   Problem Relation Age of Onset    Heart disease Father     Cancer Mother      Social History     Socioeconomic History    Marital status: /Civil Union     Spouse name: None    Number of children: None    Years of education: None    Highest education level: None   Occupational History    None   Social Needs    Financial resource strain: None    Food insecurity:     Worry: None     Inability: None    Transportation needs:     Medical: None     Non-medical: None   Tobacco Use    Smoking status: Never Smoker    Smokeless tobacco: Never Used   Substance and Sexual Activity    Alcohol use: Yes     Frequency: 2-3 times a week     Drinks per session: 1 or 2     Binge frequency: Never     Comment: socially    Drug use: No    Sexual activity: Not Currently   Lifestyle    Physical activity:     Days per week: None     Minutes per session: None    Stress: None   Relationships    Social connections:     Talks on phone: None     Gets together: None     Attends [employer] service: None     Active member of club or organization: None     Attends meetings of clubs or organizations: None     Relationship status: None    Intimate partner violence:     Fear of current or ex partner: None     Emotionally abused: None     Physically abused: None     Forced sexual activity: None   Other Topics Concern    None   Social History Narrative    None         Medications:   All current active meds have been reviewed and current meds:  Scheduled Meds:  Current Facility-Administered Medications:  acetaminophen 650 mg Oral Q6H PRN Eveline Concepcion PA-C   allopurinol 300 mg Oral Daily Jennifer Arredondo MD   [START ON 2/19/2020] aspirin 81 mg Oral Daily Mary Lopez PA-C   atorvastatin 40 mg Oral QPM Jennifer Arredondo MD   bimatoprost 1 drop Both Eyes HS Jennifer Arredondo MD   carbidopa-levodopa 2 tablet Oral TID AC Jennifer Arredondo MD   fluticasone 1 spray Each Nare Daily Mary Lopez PA-C   furosemide 10 mg Oral Daily Jennifer Arredondo MD   insulin lispro 1-5 Units Subcutaneous 4x Daily (AC & HS) Jennifer Arredondo MD   iohexol 85 mL Intravenous Once in imaging Acosta Escobar, DO   lidocaine 1% buffered   Code/Trauma/Sedation Med Pio Payne MD   lisinopril 10 mg Oral Daily Jennifer Arredondo MD   magnesium oxide 200 mg Oral Daily Jennifer Arredondo MD   metoprolol succinate 100 mg Oral Daily Jennifer Arredondo MD   oxyCODONE 5 mg Oral Q4H PRN Aydee Rivera PA-C   polyethylene glycol 17 g Oral Daily PRN Jennifer Arredondo MD   potassium chloride 20 mEq Oral Daily Jennifer Arredondo MD   rivaroxaban 20 mg Oral Daily With Breakfast Mary Lopez PA-C   senna-docusate sodium 2 tablet Oral BID PRN Jennifer Arredondo MD   tamsulosin 0 4 mg Oral BID Jennifer Arredondo MD     Continuous Infusions:   PRN Meds:   acetaminophen    iohexol    lidocaine 1% buffered    oxyCODONE    polyethylene glycol    senna-docusate sodium       ROS:   Review of Systems  A 12 point ROS was completed  Other than the above mentioned complaints in the HPI and those commented on below, all remaining systems were negative:  - Blurred vision, double vision  - Gait improving         Vitals:   /74 (BP Location: Right arm)   Pulse 70   Temp 97 8 °F (36 6 °C) (Oral)   Resp 16   Wt (!) 138 kg (304 lb 14 3 oz)   SpO2 94%   BMI 36 16 kg/m²     Physical Exam:   Constitutional: Patient is resting comfortably in the chair  Well developed, well nourished, in no acute distress  No diaphoresis  HENT: Normocephalic, atraumatic  Right and left external ear normal  Oropharynx clear and moist  Nose normal    Eyes: PERRL  Left eye exotropia  EOMs intact without nystagmus  No scleral icterus or injection  No discharge  Neck: Supple, normal ROM  No stridor noted  Cardiovascular: Regular rate    Pulmonary: No respiratory distress  Effort normal    Musculoskeletal: Normal ROM  No tenderness, edema, or deformities noted  Neurological: A&Ox3  Follows all commands  Skin: Warm and dry  No erythema or rashes  Psychiatric: Flat affect  Normal thought process and thought content, without hallucinations  Behavior and judgement normal     Neurologic Exam:  Mental Status: Patient is awake and alert  Oriented x 3  Follows all commands without difficulty  No dysarthria  No aphasia  Hypophonic  Cranial Nerves: Cranial nerves 2-12 grossly intact, except for slight left eye exotropia and subjective horizontal, binocular, diplopia  Motor: 5/5 strength throughout bilateral upper and lower extremities  Increased tone throughout  Sensation: Sensation to light touch intact throughout  Coordination: Finger to nose testing normal    No tremors noted  Bradykinesia noted  Labs: I have personally reviewed pertinent reports     Recent Results (from the past 24 hour(s))   Fingerstick Glucose (POCT)    Collection Time: 02/17/20 12:03 PM   Result Value Ref Range    POC Glucose 159 (H) 65 - 140 mg/dl   CSF white cell count with differential    Collection Time: 02/17/20 12:10 PM   Result Value Ref Range    Appearance, CSF clear and colorless     Tube Number, CSF 4     WBC, CSF 0 0 - 5 /uL    Xanthochromia No No   Meningitis/Encephalitis (ME) Panel    Collection Time: 02/17/20 12:10 PM   Result Value Ref Range    C NEOFORMANS/GATTII Not Detected Not Detected    CYTOMEGALOVIRUS Not Detected Not Detected    ENTEROVIRUS Not Detected Not Detected    E COLI K1 Not Detected Not Detected    H INFLUENZAE Not Detected Not Detected    H SIMPLEX 1 Not Detected Not Detected    H SIMPLEX 2 Not Detected Not Detected    HERPES VIRUS 6 Not Detected Not Detected    PARECHOVIRUS Not Detected Not Detected    L  MONOCYTOGENES Not Detected Not Detected    N MENINGITIDIS Not Detected Not Detected    S AGALACTIAE Not Detected Not Detected    S  PNEUMONIAE Not Detected Not Detected    V ZOSTER Not Detected Not Detected   Glucose, CSF    Collection Time: 02/17/20 12:11 PM   Result Value Ref Range    Glucose, CSF 87 (H) 50 - 80 mg/dL   RBC count,CSF    Collection Time: 02/17/20 12:11 PM   Result Value Ref Range    RBC, CSF 0 0 - 10 uL   Total Protein, CSF    Collection Time: 02/17/20 12:11 PM   Result Value Ref Range    Protein, CSF 64 (H) 15 - 45 mg/dL   STAT Gram Stain    Collection Time: 02/17/20 12:15 PM   Result Value Ref Range    Gram Stain Result No Polys or Bacteria seen    Fingerstick Glucose (POCT)    Collection Time: 02/17/20  4:10 PM   Result Value Ref Range    POC Glucose 168 (H) 65 - 140 mg/dl   Fingerstick Glucose (POCT)    Collection Time: 02/17/20  9:00 PM   Result Value Ref Range    POC Glucose 200 (H) 65 - 140 mg/dl   Fingerstick Glucose (POCT)    Collection Time: 02/18/20  7:28 AM   Result Value Ref Range    POC Glucose 115 65 - 140 mg/dl       Imaging: I have personally reviewed pertinent imaging in PACS, including MRI brain and orbits,  and I have personally reviewed PACS reports       EKG, Pathology, and Other Studies: I have personally reviewed pertinent reports         VTE Prophylaxis: Xarelto

## 2020-02-18 NOTE — OCCUPATIONAL THERAPY NOTE
Occupational Therapy  OT cancelled at attempted tx time  Pt off campus for MRI this morning  Will continue to follow as available   FAROOQ Mcneal

## 2020-02-18 NOTE — QUICK NOTE
Patient underwent lumbar puncture yesterday afternoon  Results included an elevated protein of 64 and elevated glucose, otherwise no other abnormalities  Unclear significance of elevation in protein  May be related to underlying diabetes  CSF leukemia/lymphoma flow cytometry pending  MRI brain and orbits, with and without contrast unremarkable  No evidence of acute or chronic infarction and no evidence of abnormal enhancement or tumors  Will discuss with attending neurologist and follow up with patient in the morning

## 2020-02-18 NOTE — PHYSICAL THERAPY NOTE
Physical Therapy Cancellation Note- pt is at [facility] for MRI  Will follow for ability to work with pt   Drea Carlos PT

## 2020-02-18 NOTE — ASSESSMENT & PLAN NOTE
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

## 2020-02-19 VITALS
RESPIRATION RATE: 18 BRPM | SYSTOLIC BLOOD PRESSURE: 123 MMHG | OXYGEN SATURATION: 97 % | TEMPERATURE: 98.2 F | BODY MASS INDEX: 36.13 KG/M2 | DIASTOLIC BLOOD PRESSURE: 73 MMHG | WEIGHT: 304.68 LBS | HEART RATE: 65 BPM

## 2020-02-19 LAB
GLUCOSE SERPL-MCNC: 110 MG/DL (ref 65–140)
GLUCOSE SERPL-MCNC: 133 MG/DL (ref 65–140)

## 2020-02-19 PROCEDURE — 97530 THERAPEUTIC ACTIVITIES: CPT

## 2020-02-19 PROCEDURE — 99232 SBSQ HOSP IP/OBS MODERATE 35: CPT | Performed by: PHYSICIAN ASSISTANT

## 2020-02-19 PROCEDURE — 97110 THERAPEUTIC EXERCISES: CPT

## 2020-02-19 PROCEDURE — 97535 SELF CARE MNGMENT TRAINING: CPT

## 2020-02-19 PROCEDURE — 97116 GAIT TRAINING THERAPY: CPT

## 2020-02-19 PROCEDURE — 82948 REAGENT STRIP/BLOOD GLUCOSE: CPT

## 2020-02-19 PROCEDURE — 99239 HOSP IP/OBS DSCHRG MGMT >30: CPT | Performed by: NURSE PRACTITIONER

## 2020-02-19 RX ORDER — METOPROLOL SUCCINATE 100 MG/1
100 TABLET, EXTENDED RELEASE ORAL DAILY
Qty: 30 TABLET | Refills: 0 | Status: SHIPPED | OUTPATIENT
Start: 2020-02-20

## 2020-02-19 RX ORDER — FUROSEMIDE 20 MG/1
10 TABLET ORAL DAILY
Qty: 30 TABLET | Refills: 0 | Status: SHIPPED | OUTPATIENT
Start: 2020-02-19 | End: 2021-09-10

## 2020-02-19 RX ADMIN — ASPIRIN 81 MG 81 MG: 81 TABLET ORAL at 08:34

## 2020-02-19 RX ADMIN — OXYCODONE HYDROCHLORIDE 5 MG: 5 TABLET ORAL at 08:33

## 2020-02-19 RX ADMIN — TAMSULOSIN HYDROCHLORIDE 0.4 MG: 0.4 CAPSULE ORAL at 08:33

## 2020-02-19 RX ADMIN — CARBIDOPA AND LEVODOPA 2 TABLET: 25; 100 TABLET ORAL at 12:22

## 2020-02-19 RX ADMIN — ALLOPURINOL 300 MG: 100 TABLET ORAL at 08:33

## 2020-02-19 RX ADMIN — METOPROLOL SUCCINATE 100 MG: 50 TABLET, EXTENDED RELEASE ORAL at 08:34

## 2020-02-19 RX ADMIN — FLUTICASONE PROPIONATE 1 SPRAY: 50 SPRAY, METERED NASAL at 08:33

## 2020-02-19 RX ADMIN — POTASSIUM CHLORIDE 20 MEQ: 1500 TABLET, EXTENDED RELEASE ORAL at 08:33

## 2020-02-19 RX ADMIN — MAGNESIUM GLUCONATE 500 MG ORAL TABLET 200 MG: 500 TABLET ORAL at 08:33

## 2020-02-19 RX ADMIN — CARBIDOPA AND LEVODOPA 2 TABLET: 25; 100 TABLET ORAL at 06:06

## 2020-02-19 RX ADMIN — FUROSEMIDE 10 MG: 20 TABLET ORAL at 08:34

## 2020-02-19 RX ADMIN — RIVAROXABAN 20 MG: 20 TABLET, FILM COATED ORAL at 08:33

## 2020-02-19 RX ADMIN — LISINOPRIL 10 MG: 10 TABLET ORAL at 08:33

## 2020-02-19 NOTE — TELEPHONE ENCOUNTER
Per pt the best number to get a hold of her is [phone] this is her cell, states that she is driving but should be home in 30 mins

## 2020-02-19 NOTE — TELEPHONE ENCOUNTER
Called and Left a message on [employer]'s answering machine for a call back (on communication consent)  Need to confirm which ophthalmologist the patient saw to obtain records

## 2020-02-19 NOTE — NURSING NOTE
Patient had a few episodes of bradycardia  Asymptomatic  Will continue to monitor and notify [facility] if continues

## 2020-02-19 NOTE — OCCUPATIONAL THERAPY NOTE
Occupational Therapy Treatment Note:         02/19/20 1050   Restrictions/Precautions   Weight Bearing Precautions Per Order No   Other Precautions Fall Risk;Multiple lines;Telemetry; Visual impairment   Pain Assessment   Pain Assessment No/denies pain   Pain Score No Pain   Transfers   Sit to Stand 5  Supervision   Additional items Assist x 1   Stand to Sit 5  Supervision   Additional items Assist x 1   Cognition   Overall Cognitive Status [facility]   Arousal/Participation Alert; Responsive; Cooperative   Attention Attends with cues to redirect   Orientation Level Oriented X4   Memory Within functional limits   Following Commands Follows one step commands without difficulty   Additional Activities   Additional Activities Other (Comment)  (Reviewed current plan of care  )   Additional Activities Comments Pt reports having F understanding  Activity Tolerance   Activity Tolerance Patient limited by fatigue   Medical Staff Made Aware Reported all findings to nursing staff  Assessment   Assessment Pt was seen for skilled OT with focus on completion of the [facility] Cognitive Assessment MoCA BASIC  Pt scored 25/30 on the MoCA BASIC indicating mild cognitive deficits  Areas of deficits included executive functioning scoring 0/1, fluency 1/2, delayed recall 3/5, visuopercetion 2/3 and attention scoring 2/3  Pt with c/o of visual deficits stating, "I won't be able to work"  Educated Pt regarding availability of assistive technology for reading as well as use of the computer  Pt with low mood noted  Emotional support and reassurance provided to Pt  Pt with improved mood noted and was receptive to the idea of use of the "magnifier" maliha for use with need to read labels in stores, magazines  Pt may benefit from further rehab but prefers to go directly home  Plan   Treatment Interventions ADL retraining;Functional transfer training;Cognitive reorientation; Endurance training;Patient/family training   Goal Expiration Date 02/28/20 OT Treatment Day 1   OT Frequency 3-5x/wk   Recommendation   OT Discharge Recommendation Home OT  (Pt declining STR  )   OT - OK to Discharge Yes  (when medically cleared  )   Barthel Index   Feeding 10   Bathing 0   Grooming Score 5   Dressing Score 5   Bladder Score 10   Bowels Score 10   Toilet Use Score 5   Transfers (Bed/Chair) Score 10   Mobility (Level Surface) Score 10   Stairs Score 0   Barthel Index Score 65   Modified Evans Scale   Modified Evans Scale 4   Carol Collins, [address]

## 2020-02-19 NOTE — PLAN OF CARE
Problem: Potential for Falls  Goal: Patient will remain free of falls  Description  INTERVENTIONS:  - Assess patient frequently for physical needs  -  Identify cognitive and physical deficits and behaviors that affect risk of falls    -  [place] fall precautions as indicated by assessment   - Educate patient/family on patient safety including physical limitations  - Instruct patient to call for assistance with activity based on assessment  - Modify environment to reduce risk of injury  - Consider OT/PT consult to assist with strengthening/mobility  Outcome: Progressing     Problem: METABOLIC, FLUID AND ELECTROLYTES - ADULT  Goal: Glucose maintained within target range  Description  INTERVENTIONS:  - Monitor Blood Glucose as ordered  - Assess for signs and symptoms of hyperglycemia and hypoglycemia  - Administer ordered medications to maintain glucose within target range  - Assess nutritional intake and initiate nutrition service referral as needed  Outcome: Progressing     Problem: MUSCULOSKELETAL - ADULT  Goal: Maintain or return mobility to safest level of function  Description  INTERVENTIONS:  - Assess patient's ability to carry out ADLs; assess patient's baseline for ADL function and identify physical deficits which impact ability to perform ADLs (bathing, care of mouth/teeth, toileting, grooming, dressing, etc )  - Assess/evaluate cause of self-care deficits   - Assess range of motion  - Assess patient's mobility  - Assess patient's need for assistive devices and provide as appropriate  - Encourage maximum independence but intervene and supervise when necessary  - Involve family in performance of ADLs  - Assess for home care needs following discharge   - Consider OT consult to assist with ADL evaluation and planning for discharge  - Provide patient education as appropriate  Outcome: Progressing     Problem: PAIN - ADULT  Goal: Verbalizes/displays adequate comfort level or baseline comfort level  Description  Interventions:  - Encourage patient to monitor pain and request assistance  - Assess pain using appropriate pain scale  - Administer analgesics based on type and severity of pain and evaluate response  - Implement non-pharmacological measures as appropriate and evaluate response  - Consider cultural and social influences on pain and pain management  - Notify physician/advanced practitioner if interventions unsuccessful or patient reports new pain  Outcome: Progressing     Problem: DISCHARGE PLANNING  Goal: Discharge to home or other facility with appropriate resources  Description  INTERVENTIONS:  - Identify barriers to discharge w/patient and caregiver  - Arrange for needed discharge resources and transportation as appropriate  - Identify discharge learning needs (meds, wound care, etc )  - Arrange for interpretive services to assist at discharge as needed  - Refer to Case Management Department for coordinating discharge planning if the patient needs post-hospital services based on physician/advanced practitioner order or complex needs related to functional status, cognitive ability, or social support system  Outcome: Progressing     Problem: Knowledge Deficit  Goal: Patient/family/caregiver demonstrates understanding of disease process, treatment plan, medications, and discharge instructions  Description  Complete learning assessment and assess knowledge base    Interventions:  - Provide teaching at level of understanding  - Provide teaching via preferred learning methods  Outcome: Progressing

## 2020-02-19 NOTE — NURSING NOTE
All discharge paperwork was given and reviewed with the patient and his spouse who was present  Follow up appointments were discussed  He took all his belongings with when discharged

## 2020-02-19 NOTE — PLAN OF CARE
Problem: PHYSICAL THERAPY ADULT  Goal: Performs mobility at highest level of function for planned discharge setting  See evaluation for individualized goals  Description  Treatment/Interventions: Functional transfer training, LE strengthening/ROM, Elevations, Therapeutic exercise, Patient/family training, Equipment eval/education, Bed mobility, Gait training, Continued evaluation, Spoke to nursing, OT          See flowsheet documentation for full assessment, interventions and recommendations  Outcome: Progressing  Note:   Prognosis: Fair  Problem List: Decreased strength, Decreased range of motion, Decreased endurance, Impaired balance, Decreased mobility, Decreased coordination, Impaired vision, Decreased skin integrity  Assessment: Pt  seated in bedside chair upon my arrival  Pt  reporting fatigue, however agreeable to therapeutic intervention  Performance of HEP seated in bedside chair with cues provided for proper completion  Progressed with transfers being able to complete with A of therapist with cues for hand placement/technique  Progressed with an amb  trial with use of RW and A of therapist with cues provided for LE sequencing  After completion of additional amb  trial pt  returned to room and repositioned seated in bedside chair with alarm active  Pt  will benefit from additional stair training, prior to d/c  PT will continue to recommend d/c home with HHPT vs  OPPT provided and family support as needed when medically stable  Barriers to Discharge: Inaccessible home environment  Barriers to Discharge Comments: MORIAH  Recommendation: Home PT, Outpatient PT, Home with family support     PT - OK to Discharge: Yes(if d/c when medically stable )    See flowsheet documentation for full assessment

## 2020-02-19 NOTE — ASSESSMENT & PLAN NOTE
Lab Results   Component Value Date    HGBA1C 5 9 09/28/2018       Recent Labs     02/18/20  1606 02/18/20  2057 02/19/20  0730 02/19/20  1108   POCGLU 190* 118 110 133       Blood Sugar Average: Last 72 hrs:  (P) 139 [id number]     · Resume home meds on discharge

## 2020-02-19 NOTE — DISCHARGE SUMMARY
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  · CT head reviewed   · LP performed 2/17 by IR with elevated protein noted   · Patient transferred to [place] yesterday for MRI   · Follow up MRI results - No mass effect, acute intracranial hemorrhage or evidence of recent infarction  No abnormal parenchymal or leptomeningeal enhancement identified  No discrete T2 signal abnormality or abnormal enhancement identified along the optic nerve sheath complexes  Mild scattered chronic microvascular ischemic change  · Neurology recommending follow up at [facility] and follow up scheduled for 3/30  Neurology to try and move this up for patient         HTN (hypertension)  Assessment & Plan  · Maintained on lisinopril, toprol XL and lasix       Chronic diastolic CHF (congestive heart failure) ([facility] [address] )  Assessment & Plan  Wt Readings from Last 3 Encounters:   02/19/20 (!) 138 kg (304 lb 10 8 oz)   12/23/19 (!) 137 kg (302 lb)   06/17/19 136 kg (299 lb)     · Euvolemic, continue home medication    Continue to follow Cardiology as an outpatient  · Home VNA nurse ordered on discharge        Atrial fibrillation, chronic  Assessment & Plan  · Rate controlled, continue metoprolol   · Restart Xarelto     Diabetes mellitus type 2 with neurological manifestations [facility])  Assessment & Plan  Lab Results   Component Value Date    HGBA1C 5 9 09/28/2018       Recent Labs     02/18/20  1606 02/18/20  2057 02/19/20  0730 02/19/20  1108   POCGLU 190* 118 110 133       Blood Sugar Average: Last 72 hrs:  (P) 139 [id number]     · Resume home meds on discharge Hypertrophic cardiomyopathy ([facility] [address] )  Assessment & Plan  · Follows with [facility]   · S/p Biventricular implantable cardioverter-defibrillator (ICD) in situ 12/17/2019   · Appears euvolemic  · Continue lasix regimen   · Continue to follow with Cardiology as an outpatient  Parkinson's disease [facility])  Assessment & Plan  · Continue Sinemet  · PT/OT: home PT/OT      Discharging Physician / Practitioner: BLESSING Lockhart  PCP: Noah Saucedo MD  Admission Date:   Admission Orders (From admission, onward)     Ordered        02/13/20 2022  Inpatient Admission (expected length of stay for this patient Order details is greater than two midnights)  Once                   Discharge Date: 02/19/20    Resolved Problems  Date Reviewed: 2/19/2020    None          Consultations During Hospital Stay:  · Neurology     Procedures Performed:   · Lumbar Puncture - Protein elevated at 64  Significant Findings / Test Results:   · MRI brain and orbits- No mass effect, acute intracranial hemorrhage or evidence of recent infarction  No abnormal parenchymal or leptomeningeal enhancement identified  No discrete T2 signal abnormality or abnormal enhancement identified along the optic nerve sheath complexes  Mild scattered chronic microvascular ischemic change  Incidental Findings:   · None    Test Results Pending at Discharge (will require follow up):   CSF leukemia/lymphoma flow cytometry pending  Outpatient Tests Requested:  · None    Complications:  None    Reason for Admission: visual changes     Hospital Course:     Vidhya William is a 65 y o  male patient who originally presented to the hospital on 2/13/2020 due to visual changes  Patient following neurology and he is ophthalmologist for a diplopia and visual changes  He visited his ophthalmologist this morning cane for 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 he was sent to the ER  Patient admitted in consultation with Neurology    Patient has no focal neurologic deficit  Patient had an MRI done as inpatient, results above  Also had lumbar puncture which showed an unclear reason for protein elevation  CSF leukemia/lymphoma flow cytometry pending  Patient is recommended to follow up with [facility] neuro ophthalmologist for further workup  Please see above list of diagnoses and related plan for additional information  Condition at Discharge: stable     Discharge Day Visit / Exam:     Subjective:  Patient resting comfortably  States he is generally well  Still have periods of double vision  Vitals: Blood Pressure: 123/73 (02/19/20 0927)  Pulse: 65 (02/19/20 0927)  Temperature: 98 2 °F (36 8 °C) (02/19/20 0728)  Temp Source: Temporal (02/19/20 0728)  Respirations: 18 (02/19/20 0728)  Weight - Scale: (!) 138 kg (304 lb 10 8 oz) (02/19/20 0533)  SpO2: 97 % (02/19/20 0927)  Exam:   Physical Exam   Constitutional: He is oriented to person, place, and time  He appears well-developed and well-nourished  obese   HENT:   Head: Normocephalic and atraumatic  Eyes: Pupils are equal, round, and reactive to light  EOM are normal    Neck: Normal range of motion  Cardiovascular: Normal rate and regular rhythm  No murmur heard  Pulmonary/Chest: Effort normal and breath sounds normal    Abdominal: Soft  Bowel sounds are normal  He exhibits no distension  Musculoskeletal: Normal range of motion  He exhibits no edema  Neurological: He is alert and oriented to person, place, and time  Skin: Skin is warm and dry  Psychiatric: He has a normal mood and affect  His behavior is normal  Judgment and thought content normal    Vitals reviewed  Discussion with Family: none    Discharge instructions/Information to patient and family:   See after visit summary for information provided to patient and family  Provisions for Follow-Up Care:  See after visit summary for information related to follow-up care and any pertinent home health orders        Disposition: Home with VNA Services (Reminder: Complete face to face encounter)    For Discharges to [facility]:   · Not Applicable to this Patient - Not Applicable to this Patient    Planned Readmission: no plan for readmission      Discharge Statement:  I spent 40 minutes discharging the patient  This time was spent on the day of discharge  I had direct contact with the patient on the day of discharge  Greater than 50% of the total time was spent examining patient, answering all patient questions, arranging and discussing plan of care with patient as well as directly providing post-discharge instructions  Additional time then spent on discharge activities  Discharge Medications:  See after visit summary for reconciled discharge medications provided to patient and family        ** Please Note: This note has been constructed using a voice recognition system **

## 2020-02-19 NOTE — ASSESSMENT & PLAN NOTE
· Follows with [facility] Cardiology   · S/p Biventricular implantable cardioverter-defibrillator (ICD) in situ 12/17/2019   · Appears euvolemic  · Continue lasix regimen   · Continue to follow with Cardiology as an outpatient

## 2020-02-19 NOTE — ASSESSMENT & PLAN NOTE
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  · CT head reviewed   · LP performed 2/17 by IR with elevated protein noted   · Patient transferred to [facility] yesterday for MRI   · Follow up MRI results - No mass effect, acute intracranial hemorrhage or evidence of recent infarction  No abnormal parenchymal or leptomeningeal enhancement identified  No discrete T2 signal abnormality or abnormal enhancement identified along the optic nerve sheath complexes  Mild scattered chronic microvascular ischemic change  · Neurology recommending follow up at [facility] and follow up scheduled for 3/30    Neurology to try and move this up for patient

## 2020-02-19 NOTE — PHYSICAL THERAPY NOTE
Physical Therapy Progress Note     02/19/20 0950   Pain Assessment   Pain Assessment No/denies pain   Pain Score No Pain   Restrictions/Precautions   Weight Bearing Precautions Per Order No   Other Precautions Fall Risk;Visual impairment;Telemetry;Multiple lines   General   Chart Reviewed Yes   Response to Previous Treatment Patient reporting fatigue but able to participate  Family/Caregiver Present No   Subjective   Subjective Willing to participate in therapy this AM    Transfers   Sit to Stand 5  Supervision   Additional items Assist x 1; Armrests; Increased time required;Verbal cues   Stand to Sit 5  Supervision   Additional items Assist x 1; Armrests; Increased time required;Verbal cues   Ambulation/Elevation   Gait pattern Decreased foot clearance; Forward Flexion; Short stride; Excessively slow; Inconsistent mayelin; Shuffling   Gait Assistance 4  Minimal assist   Additional items Assist x 1;Verbal cues; Tactile cues; Other (Comment)  (CG)   Assistive Device Rolling walker   Distance 90' x 2 with a standing resting period in between   Balance   Static Sitting Good   Dynamic Sitting Fair +   Static Standing Fair   Dynamic Standing Fair -   Ambulatory Poor +   Endurance Deficit   Endurance Deficit No   Activity Tolerance   Activity Tolerance Patient tolerated treatment well   Nurse Made Aware Yes   Exercises   TKR Sitting;10 reps;AAROM; Bilateral   Assessment   Prognosis Fair   Problem List Decreased strength;Decreased range of motion;Decreased endurance; Impaired balance;Decreased mobility; Decreased coordination; Impaired vision;Decreased skin integrity   Assessment Pt  seated in bedside chair upon my arrival  Pt  reporting fatigue, however agreeable to therapeutic intervention  Performance of HEP seated in bedside chair with cues provided for proper completion  Progressed with transfers being able to complete with A of therapist with cues for hand placement/technique   Progressed with an amb  trial with use of RW and A of therapist with cues provided for LE sequencing  After completion of additional amb  trial pt  returned to room and repositioned seated in bedside chair with alarm active  Pt  will benefit from additional stair training, prior to d/c  PT will continue to recommend d/c home with HHPT vs  OPPT provided and family support as needed when medically stable  Barriers to Discharge Inaccessible home environment   Barriers to Discharge Comments MORIAH   Goals   Patient Goals To go home  STG Expiration Date 02/24/20   PT Treatment Day 1   Plan   Treatment/Interventions Functional transfer training;LE strengthening/ROM; Therapeutic exercise; Endurance training;Gait training;Spoke to nursing;Spoke to case management   Progress Progressing toward goals   PT Frequency Other (Comment)  (3-4x/wk)   Recommendation   Recommendation Home PT;Outpatient PT; Home with family support   Equipment Recommended Other (Comment)  (has DME)   PT - OK to Discharge Yes  (if d/c when medically stable )     Dillon Ewing, PTA

## 2020-02-19 NOTE — ASSESSMENT & PLAN NOTE
Wt Readings from Last 3 Encounters:   02/19/20 (!) 138 kg (304 lb 10 8 oz)   12/23/19 (!) 137 kg (302 lb)   06/17/19 136 kg (299 lb)     · Euvolemic, continue home medication    Continue to follow Cardiology as an outpatient  · Home VNA nurse ordered on discharge

## 2020-02-19 NOTE — TELEPHONE ENCOUNTER
Pt's wife reports that he saw Dr Srinivasan Ceballos with [address] optho in [place] [phone], she believes this was on the 13th  Desiree can you please assist in obtaining this  She is asking for the results of LP and MRI, please advise

## 2020-02-20 LAB — BACTERIA CSF CULT: NO GROWTH

## 2020-02-20 NOTE — TELEPHONE ENCOUNTER
Dr Brandy Sloan,   I placed a referral for you for ophthalmology to [facility] below  Please review and sign off if you agree  We can then get this started      TY

## 2020-02-20 NOTE — TELEPHONE ENCOUNTER
Tried calling multiple times yesterday and this morning  MRI of the brain and his LP look ok  Can we help them get an appointment scheduled at [facility] [place] [facility], neuro-ophtho?

## 2020-02-25 DIAGNOSIS — H53.2 DIPLOPIA: Primary | ICD-10-CM

## 2020-02-25 NOTE — TELEPHONE ENCOUNTER
Patient scheduled to see Anjelica Dsouza MD on 4/1/20 at [facility]  They are attempting to have the appointment moved up if possible      Dr Eder Ortiz, [facility]

## 2020-03-02 LAB — SCAN RESULT: NORMAL

## 2020-03-26 ENCOUNTER — TELEPHONE (OUTPATIENT)
Dept: NEUROLOGY | Facility: CLINIC | Age: 70
End: 2020-03-26

## 2020-03-26 NOTE — TELEPHONE ENCOUNTER
Pt declines virtual video stating he would not know how but would like to keep appt    appt confirmed for 03/30/20 4 pm [facility] location

## 2020-03-30 ENCOUNTER — OFFICE VISIT (OUTPATIENT)
Dept: NEUROLOGY | Facility: CLINIC | Age: 70
End: 2020-03-30
Payer: MEDICARE

## 2020-03-30 VITALS
HEART RATE: 73 BPM | BODY MASS INDEX: 35.66 KG/M2 | SYSTOLIC BLOOD PRESSURE: 138 MMHG | HEIGHT: 77 IN | WEIGHT: 302 LBS | DIASTOLIC BLOOD PRESSURE: 66 MMHG

## 2020-03-30 DIAGNOSIS — H53.2 DIPLOPIA: ICD-10-CM

## 2020-03-30 DIAGNOSIS — G20 PARKINSON'S DISEASE (HCC): Primary | ICD-10-CM

## 2020-03-30 PROCEDURE — 99214 OFFICE O/P EST MOD 30 MIN: CPT | Performed by: PSYCHIATRY & NEUROLOGY

## 2020-03-30 NOTE — PROGRESS NOTES
Patient ID: Zahraa Worley is a 65 y o  male  Assessment/Plan:    Diplopia  Unfortunately the patient came late today so our time was limited  We spent a good portion of the visit reviewing the workup done regarding his diplopia which is currently is most bothersome symptom  He is using an eye patch which helps  We discussed alternating eyes to prevent the eye fatigue he has been feeling  I asked him to make sure [facility] sends me a consult note for review  Parkinson's disease [facility])  65year-old man presents in follow-up for Parkinson's disease  Currently the patient is most bothersome symptoms are his diplopia and shortness of breath  He is not particularly bothered by tremor, slowness or stiffness  Gait seems to be improved with proper use of his walker now that he is no longer working  He does seem to be getting deconditioned but I am happy still get some physical therapy in his home  More intensive physical therapy will likely be very beneficial, when available again  We discussed different options regarding medication management but agreed not to make any changes for the time being will he completes his workup for diplopia  Still has his 2nd opinion planned for next month with Dr Elliot Jarquin  Diagnoses and all orders for this visit:    Parkinson's disease ([facility] [address] )    Diplopia           Subjective:    HPI    I had the pleasure of seeing your patient, Zahraa Worley in the Movement Disorders Clinic at the [address] [place] [address] Neuroscience        Cee Joyner is a 65year-old man with AFib, hypertrophic cardiomyopathy, ERROL and diabetes with diabetic neuropathy who presents in follow-up for levodopa responsive Parkinson's disease, symptom onset in 2016/2017 with bl thumb tremor and gait dysfunction      Current medications and timing:  Sinemet 25/100, 2 tab TID @ 8-9 1-2pm QHS (11-MN)    Interval history:  When I last saw the patient he was complaining of new onset diplopia with clear dysconjugate gaze  Her for the patient to Neuro-Ophthalmology and ordered an MRI of the brain  We discussed that he may be suffering from a decompensated phoria  He saw a local ophthalmologist who apparently noted some inflammatory cells during his exam and 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  This prompted an admission to our inpatient service were an extensive workup was unremarkable including MRI of the brain and orbits with and without contrast and a lumbar puncture to look for flow and cytology  He saw another local ophthalmologist who believed his diplopia was directly related to his Parkinson's disease  Apparently a does have an appointment set with [facility] and the next month  The patient was last here we also increased his carbidopa/levodopa to 2 tablets 3 times day  The patient did not notice much difference in his symptoms however he also did not notice a difference previously when his family did  Given the current COVID pandemic he has not been nearly as active as usual   He tries to go for a walk 2-3 times per week  He is using his walker lot more in the home  He is no longer working, having recently lost his job  All of his prior falls occurred at work and he has only had 1 since stopped working  Does not tend to fall with a walker  Getting home PT still but down to just once per week  The following portions of the patient's history were reviewed and updated as appropriate: allergies, current medications, past family history, past medical history, past social history, past surgical history and problem list          Objective:    Blood pressure 138/66, pulse 73, height 6' 5" (1 956 m), weight (!) 137 kg (302 lb)  Physical Exam    Neurological Exam  GENERAL MEDICAL EXAMINATION:  General appearance: alert, in no apparent distress  Appropriately dressed and groomed  Conversing and interacting appropriately  Eyes: Sclera are non-injected     Ears, nose, Mouth, Throat: Mucous membranes are moist    Resp: Breathing comfortably on RA   Musculoskeletal: No evidence of deformities  No contractures  No Edema  Skin: No visible rashes  Warm and well perfused  Psych: normal and appropriate affect     Mental Status:  Alert and oriented to person place and time  Able to relate history without difficulty  Attentive to conversation  Language is fluent and appropriate with normal prosody  There were no paraphasic errors  Speech was not dysarthric  Able to follow both midline and appendicular commands  General Neurology examination:  3 hours since his last dose of carbidopa/levodopa  Again variable dysconjugate gaze is noted, exo  Face activates symmetrically  Speech is mildly hypophonic  Moderate hypomimia  Slight to mild focal bradykinesia on the right and mild-to-moderate on the left  Mild reduction in global spontaneous movements  No postural or kinetic tremor noted  Slight bilateral rest tremor noted in the thumbs, consistency about 25%  He is slowed arise able to do so with use his arms  Normal initiation  Walks with a walker, decreased stride length and step height  Turn is en bloc with freezing on the turn  ROS:  Review of Systems   Constitutional: Negative  Negative for appetite change and fever  HENT: Negative  Negative for hearing loss, tinnitus, trouble swallowing and voice change  Eyes: Positive for pain (when tired)  Negative for photophobia  Sees double all the time, sees better when closes one eye   Respiratory: Positive for shortness of breath  Cardiovascular: Negative  Negative for palpitations  Gastrointestinal: Negative  Negative for nausea and vomiting  Endocrine: Negative  Negative for cold intolerance  Genitourinary: Negative  Negative for dysuria, frequency and urgency  Musculoskeletal: Negative  Negative for myalgias and neck pain  Skin: Negative  Negative for rash     Neurological: Positive for tremors (both hands ), speech difficulty (feels he doesn't talk like he used to ), weakness and light-headedness  Negative for dizziness, seizures, syncope, facial asymmetry, numbness and headaches  Balance problems, difficulty walking    Hematological: Negative  Does not bruise/bleed easily  Psychiatric/Behavioral: Positive for confusion (Sometimes when he first wakes up ) and sleep disturbance (trouble falling asleep, wakes up at night )  Negative for hallucinations  The above ROS was reviewed and updated       Vipul Xie MD  Medical Director   [facility]  Movement and Memory Specialist

## 2020-03-31 NOTE — ASSESSMENT & PLAN NOTE
Unfortunately the patient came late today so our time was limited  We spent a good portion of the visit reviewing the workup done regarding his diplopia which is currently is most bothersome symptom  He is using an eye patch which helps  We discussed alternating eyes to prevent the eye fatigue he has been feeling  I asked him to make sure [facility] sends me a consult note for review

## 2020-03-31 NOTE — ASSESSMENT & PLAN NOTE
65year-old man presents in follow-up for Parkinson's disease  Currently the patient is most bothersome symptoms are his diplopia and shortness of breath  He is not particularly bothered by tremor, slowness or stiffness  Gait seems to be improved with proper use of his walker now that he is no longer working  He does seem to be getting deconditioned but I am happy still get some physical therapy in his home  More intensive physical therapy will likely be very beneficial, when available again  We discussed different options regarding medication management but agreed not to make any changes for the time being will he completes his workup for diplopia  Still has his 2nd opinion planned for next month with Dr Jonatan Mcdonald

## 2020-08-13 ENCOUNTER — OFFICE VISIT (OUTPATIENT)
Dept: NEUROLOGY | Facility: CLINIC | Age: 70
End: 2020-08-13
Payer: MEDICARE

## 2020-08-13 VITALS
BODY MASS INDEX: 36.28 KG/M2 | TEMPERATURE: 97 F | SYSTOLIC BLOOD PRESSURE: 124 MMHG | DIASTOLIC BLOOD PRESSURE: 80 MMHG | HEIGHT: 77 IN | WEIGHT: 307.3 LBS

## 2020-08-13 DIAGNOSIS — G20 PARKINSON'S DISEASE (HCC): Primary | ICD-10-CM

## 2020-08-13 PROCEDURE — 99214 OFFICE O/P EST MOD 30 MIN: CPT | Performed by: PSYCHIATRY & NEUROLOGY

## 2020-08-13 NOTE — PROGRESS NOTES
Assessment/Plan:    Parkinson's disease [facility])  75-year-old man presents in follow-up for Parkinson's disease verses an atypical parkinsonian syndrome  Does appear to be at least somewhat levodopa responsive  Today on exam he had very prominent frontalis activation and mildly slowed vertical saccades  His axial tone was normal   High re-reviewed his brain MRI and there is questionable midbrain atrophy  For now we will keep the patient on 3 tablets of Sinemet 3 times daily  Given that he does appear to be levodopa responsive we discussed the option of adding on another medication to his Sinemet  Specifically we discussed nourianz and azilect given favorable side effect profiles  Will plan to wait for his follow-up with Dr Michelle Luna to make any changes  Diagnoses and all orders for this visit:    Parkinson's disease ([facility] [address] )        Subjective:     Patient ID: Nathalia Burgos is a 65 y o  male  I had the pleasure of seeing your patient, Nathalia Burgos in the Movement Disorders Clinic at the  [facility]        Alan Jenkins is a 65year-old man with AFib, hypertrophic cardiomyopathy, ERROL and diabetes with diabetic neuropathy who presents in follow-up for levodopa responsive Parkinson's disease, symptom onset in 2016/2017 with bl thumb tremor and gait dysfunction  Course has been complicated by diplopia and concern for an atypical parkinsonian syndrome      Current medications and timing:  Sinemet 25/100, 3 tab TID @ 8-9 1-2pm QHS (11-MN)     Interval history:  The patient was evaluated at the [address] [place] [address] by Dr Michelle Luna  Concern was raised about the possibility of MSA  His Sinemet was increased from 2 tablets 3 times a day to 3 tablets 3 times a day without any obvious benefits  Was then weaned down on his Sinemet until he was taking 2/1/1 at which point his symptoms got worse  He then slowly titrated back up to 3 tabs TID    With overall improvement in his symptoms  Feels bad early in the morning  Feels better in the afternoon  More issues with executive function  Difficulty planning things  Was an executive professionally for years  Time management  Walking has worsened  More balance troubles  Some swallowing troubles  Has SLP established  The following portions of the patient's history were reviewed and updated as appropriate: allergies, current medications, past family history, past medical history, past social history, past surgical history and problem list       Objective:  /80   Temp (!) 97 °F (36 1 °C)   Ht 6' 5" (1 956 m)   Wt (!) 139 kg (307 lb 4 8 oz)   BMI 36 44 kg/m²     Physical Exam    Neurological Exam   GENERAL MEDICAL EXAMINATION:  General appearance: alert, in no apparent distress  Appropriately dressed and groomed  Conversing and interacting appropriately  Eyes: Sclera are non-injected  Ears, nose, Mouth, Throat: Mucous membranes are moist    Resp: Breathing comfortably on RA   Musculoskeletal: No contractures  No Edema  Skin: No visible rashes  Warm and well perfused  Psych: normal and appropriate affect     Mental Status:  Able to relate history without difficulty  Attentive to conversation  Language is fluent and appropriate with normal prosody  There were no paraphasic errors  Speech was not dysarthric  Able to follow both midline and appendicular commands       General Neurology examination:   Frontalis hyperactivation   Dysconjugate gaze (as described previously)  Slightly slowed vertical saccades     UPDRS motor:                              Time since last dose:       Speech  1     Facial Expression  2     Rigidity - Neck  0     Rigidity - Upper Extremity (Right)  2     Rigidity - Upper Extremity (Left)   1     Rigidity - Lower Extremity (Right)  1     Rigidity - Lower Extremity (Left)   1     Finger Taps (Right)   2     Finger Taps (Left)   2     Hand Movement (Right)  1     Hand Movement (Left)   1 Pronation/Supination (Right)  2     Pronation/Supination (Left)   1     Toe Tapping (Right) 2     Toe Tapping (Left) 2     Leg Agility (Right)  1     Leg Agility (Left)   1     Arising from Chair   2     Gait   3     Freezing of Gait 0     Postural Stability        Posture 2     Global spontaneity of movement 2     Postural Tremor (Right) 1     Postural Tremor (Left) 0     Kinetic Tremor (Right)  0     Kinetic Tremor (Left)  0     Rest tremor amplitude RUE 0     Rest tremor amplitude LUE 0     Rest tremor amplitude RLE 0     Reset tremor amplitude LLE 0     Lip/Jaw Tremor  0     Consistency of tremor      Motor Exam Total:          Dysmetria: none on FNF  Dyskinesia: none  Dystonia: none     Stance: narrow-based and stable   Stride: mildly slowed speed  Significant reduction in stride length, step height  walks with a walker   Arm swing: walker   Turn: not stable, 4 steps       Review of Systems   Constitutional: Negative  Negative for appetite change and fever  HENT: Negative  Negative for hearing loss, tinnitus, trouble swallowing and voice change  Eyes: Negative  Negative for photophobia and pain  Respiratory: Negative  Negative for shortness of breath  Cardiovascular: Negative  Negative for palpitations  Gastrointestinal: Negative  Negative for nausea and vomiting  Endocrine: Negative  Negative for cold intolerance  Genitourinary: Negative  Negative for dysuria, frequency and urgency  Musculoskeletal: Positive for gait problem  Negative for myalgias and neck pain  Skin: Negative  Negative for rash  Neurological: Positive for dizziness and light-headedness  Negative for tremors, seizures, syncope, facial asymmetry, speech difficulty, weakness, numbness and headaches  Hematological: Negative  Does not bruise/bleed easily  Psychiatric/Behavioral: Positive for sleep disturbance  Negative for confusion and hallucinations  The above ROS was reviewed and updated       Loretta Humphreys Alesha Miranda, [id number] [address]  Movement and Memory Specialist       Current Outpatient Medications on File Prior to Visit   Medication Sig Dispense Refill    allopurinol (ZYLOPRIM) 300 mg tablet Take 300 mg by mouth daily   aspirin 81 MG tablet Take 81 mg by mouth daily      atorvastatin (LIPITOR) 40 mg tablet Take 40 mg by mouth every evening      Bimatoprost (LUMIGAN OP) Apply 1 drop to eye daily at bedtime Both eyes       brimonidine (ALPHAGAN P) 0 15 % ophthalmic solution Administer 1 drop to the right eye daily 5 mL 0    carbidopa-levodopa (SINEMET)  mg per tablet Take 2 tablets by mouth 3 (three) times a day before meals (Patient taking differently: Take 2 tablets by mouth 3 (three) times a day before meals 3 tabs TID) 540 tablet 3    cholecalciferol 1000 units tablet Take 1 tablet (1,000 Units total) by mouth daily 30 tablet 0    cyanocobalamin 1000 MCG tablet Take 1 tablet (1,000 mcg total) by mouth daily 30 tablet 0    FOLIC ACID PO 0 4 mg daily      furosemide (LASIX) 20 mg tablet Take 0 5 tablets (10 mg total) by mouth daily Do NOT take if systolic BP is under 373 30 tablet 0    glucose blood test strip CHECK BLOOD SUGAR ONCE OR TWICE DAILY  E11 [id number] 4      magnesium oxide (MAG-OX) 400 mg Take 200 mg by mouth daily       metFORMIN (GLUCOPHAGE) 1000 MG tablet Take 1,000 mg by mouth 2 (two) times a day with meals       metoprolol succinate (TOPROL-XL) 100 mg 24 hr tablet Take 1 tablet (100 mg total) by mouth daily 30 tablet 0    Multiple Vitamin (MULTIVITAMIN) tablet Take 1 tablet by mouth daily        polyethylene glycol (MIRALAX) 17 g packet Take 17 g by mouth daily (Patient taking differently: Take 17 g by mouth as needed ) 14 each 0    potassium chloride (K-DUR,KLOR-CON) 20 mEq tablet Take by mouth daily Dosage unknown to patient      rivaroxaban (XARELTO) 20 mg tablet Take 1 tablet (20 mg total) by mouth daily with dinner 30 tablet 0    lisinopril (ZESTRIL) 10 mg tablet       senna-docusate sodium (SENOKOT S) 8 6-50 mg per tablet Take 2 tablets by mouth 2 (two) times a day as needed for constipation (Patient not taking: Reported on 8/13/2020) 100 tablet 0    tamsulosin (FLOMAX) 0 4 mg TAKE 1 CAPSULE TWICE A DAY (Patient not taking: Reported on 8/13/2020) 180 capsule 1     No current facility-administered medications on file prior to visit

## 2020-08-13 NOTE — PATIENT INSTRUCTIONS
Let me know when you are going to see Dr Robbie Prince    The two medications we talked about were Juan Davidine Padma and Philipp Sellers

## 2020-08-13 NOTE — ASSESSMENT & PLAN NOTE
70-year-old man presents in follow-up for Parkinson's disease verses an atypical parkinsonian syndrome  Does appear to be at least somewhat levodopa responsive  Today on exam he had very prominent frontalis activation and mildly slowed vertical saccades  His axial tone was normal   High re-reviewed his brain MRI and there is questionable midbrain atrophy  For now we will keep the patient on 3 tablets of Sinemet 3 times daily  Given that he does appear to be levodopa responsive we discussed the option of adding on another medication to his Sinemet  Specifically we discussed nourianz and azilect given favorable side effect profiles  Will plan to wait for his follow-up with Dr Catie John to make any changes

## 2020-08-20 ENCOUNTER — TELEPHONE (OUTPATIENT)
Dept: NEUROLOGY | Facility: CLINIC | Age: 70
End: 2020-08-20

## 2020-08-20 NOTE — TELEPHONE ENCOUNTER
pt's wife called, states that pt has been having more confusion in the last few days  yesterday he thought that he had people coming over in regards to work, he hasn't work with that company for decades  she states that a few days ago, he states that he lost time, he took a nap and when he woke up in the afternoon, he thought it was the middle of the night and he felt like he lost the whole day  today he is pretty good, no confusion   in the past he would woke up from vivid dreams and was confused and then was able to understand that this was a dream    No signs of any type of infection, no new neds  Carb/levo 25/100mg 3 tabs tid   Please advise  [phone] to leave a detailed message

## 2020-08-20 NOTE — TELEPHONE ENCOUNTER
There is a medication that we can use called rivastigmine which can be helpful for cognition (and sometimes walking troubles) in Parkinson's   This can sometimes slow down heart rhythms and he already has abnormal heart rhythms so we would need to clear it with his cardiologist

## 2020-08-26 DIAGNOSIS — G20 PARKINSON'S DISEASE (HCC): Primary | ICD-10-CM

## 2020-08-26 RX ORDER — RIVASTIGMINE TARTRATE 1.5 MG/1
1.5 CAPSULE ORAL 2 TIMES DAILY
Qty: 60 CAPSULE | Refills: 1 | Status: SHIPPED | OUTPATIENT
Start: 2020-08-26 | End: 2020-09-18

## 2020-08-26 NOTE — TELEPHONE ENCOUNTER
Pt's wife made aware  She is agreeable with trying rivastigmine  She states that pt has a pace maker and thinks he would be ok  He see dr Merrick Patterson with [facility] cardiology      Please advise

## 2020-08-26 NOTE — TELEPHONE ENCOUNTER
Will start with a low dose taken BID  If tolerated but no improvement we can go up with his next refill

## 2020-08-27 NOTE — TELEPHONE ENCOUNTER
Sounds good  We will try the rivastigmine in the interim but not make any changes to his other medications until after with follow up with Mignon Kerr

## 2020-08-27 NOTE — TELEPHONE ENCOUNTER
Called and made patient's wife aware  She wanted to let you know that patient will have an appointment with Dr Robbie Prince in September FYI

## 2020-09-16 ENCOUNTER — ANESTHESIA EVENT (OUTPATIENT)
Dept: PERIOP | Facility: HOSPITAL | Age: 70
End: 2020-09-16
Payer: MEDICARE

## 2020-09-16 RX ORDER — SENNOSIDES 8.6 MG
650 CAPSULE ORAL EVERY 8 HOURS PRN
COMMUNITY

## 2020-09-16 RX ORDER — CEPHALEXIN 500 MG/1
500 CAPSULE ORAL EVERY 6 HOURS SCHEDULED
COMMUNITY
End: 2021-09-10

## 2020-09-16 RX ORDER — CAPSAICIN, MENTHOL .025; 1.25 G/100G; G/100G
PATCH TOPICAL AS NEEDED
COMMUNITY

## 2020-09-16 NOTE — ANESTHESIA PREPROCEDURE EVALUATION
Procedure:  EXCISION LESION OF FOREHEAD W/FROZEN SECTION (N/A Face)    Relevant Problems   CARDIO  pt has HCM and is on toprol XL 50 mg BID as per pt   we will give him is morning dose this am  HR 70 this am  NO LVOT as per recent echo    (+) Atrial fibrillation, chronic   (+) Dyspnea on exertion   (+) HTN (hypertension)      ENDO   (+) Diabetes mellitus, type 2 (HCC) (B sugar 107 mg %  pt on metformin  )      NEURO/PSYCH   (+) Diplopia      PULMONARY   (+) Dyspnea on exertion   (+) Obstructive sleep apnea syndrome      Other   (+) Failed total right knee replacement (HCC)   (+) Hypertrophic cardiomyopathy (HCC)   (+) Parkinson's disease (HCC)        Physical Exam    Airway    Mallampati score: II  TM Distance: >3 FB  Neck ROM: full     Dental   implants,     Cardiovascular  Cardiovascular exam normal    Pulmonary  Pulmonary exam normal     Other Findings        Anesthesia Plan  ASA Score- 3     Anesthesia Type- IV sedation with anesthesia with ASA Monitors  Additional Monitors:   Airway Plan:           Plan Factors-Exercise tolerance (METS): <4 METS  Chart reviewed  EKG reviewed  Existing labs reviewed  Patient is not a current smoker  Patient not instructed to abstain from smoking on day of procedure  Patient did not smoke on day of surgery  Induction-     Postoperative Plan- Plan for postoperative opioid use  Informed Consent- Anesthetic plan and risks discussed with patient  I personally reviewed this patient with the CRNA  Discussed and agreed on the Anesthesia Plan with the CRNA  Dharmesh Orozco         Aortic stenosis   Mild to moderate aortic stenosis on last ECHO    Aspiration pneumonitis (HCC)    Atrial fibrillation (HCC)   On Xarelto    Atrial flutter ([address] )   S/p DCCV Nov 2016, bedside CV 1/30/18, afib ablation 2/22/18, & DCCV 4/12/18    BPH (benign prostatic hyperplasia)    Chronic combined systolic and diastolic CHF (congestive heart failure) (HCC)   EF 50% from 35% prior on last ECHO Diabetes type [facility], [facility]    Glaucoma    Gout    Hernia    Hyperlipidemia    Hypertension    [facility]    LBBB (left bundle branch block)    Lyme disease    Obesity    Primary osteoarthritis of knee    Primary Parkinsonism (HCC)    Sleep apnea   Intolerant of CPAP    Uveitis    Vitreous opacities of both eyes   AV NODE ABLATION    CARDIAC CATHETERIZATION   no stents placed    CARDIOVERSION   Multiple ones    FOOT SURGERY Left    HERNIA REPAIR    OTHER SURGICAL HISTORY   gout removed from elbow    TOTAL KNEE ARTHROPLASTY Right    TOTAL SHOULDER REPLACEMENT Left       EKG: reviewed  Atrial Flutter  V-paced rhythm      7/2/2020 cardiology assessment and plan     Persistent atrial fibrillation (HCC) ECG 12-LEAD   2  Hypertrophic cardiomyopathy ([facility] [address] )   3  Biventricular implantable cardioverter-defibrillator (ICD) in situ     1  Atrial fibrillation, persistent  Underwent A  Fib ablation (by Dr Katiana Bello) on 2/22/18  Subsequently developed atrial flutter for which he underwent DCCV on 4/12/18  The patient felt dizzy and off balance when he turned his head or changes posture  Clinically appears to be secondary to vestibular dysfunction, probably related to amiodarone  Amiodarone was stopped  He was admitted on 9/13/19 from cardiology office with HR to 160s and hypotension  Was in AF with RVR  Treated with IV amiodarone and digoxin  Audrey Birmingham on 9/14/19  However he went back into AF shortly afterwards  Echo showed EF of 30%  Underwent BiV ICD implant and AV node ablation on 9/16/19    Interestingly he converted to sinus rhythm likely response from CRT  Maintained sinus rhythm for a few months  However went back into atrial fibrillation on June 18, 2020 and has been in atrial fibrillation since then  On Xarelto for stroke prevention     2  Hypertrophic cardiomyopathy ([facility] [address] )  S/p BiV ICD on 9/16/2019  Continue Toprol XL   Have stopped lisinopril due to borderline low blood pressure      3  Biventricular implantable cardioverter-defibrillator (ICD) in situ  Underwent BiV ICD implant and AV node ablation on 9/16/19 for HCM, LV EF 35%, LBBB, AF with RVR (despite AF ablation and pharmacologic therapy)  Enrolled in remote monitoring  Device interrogation June 23, 2020 showed normal functioning ICD  Has had high threshold from electrodes 3 to 4  The device was reprogrammed with LV pacing from electrodes 2 to 3 with lower thresholds 1 3 V at 0 9 msec  No phrenic     Electronically signed by Lauren Gao MD at 07/12/2020 7:33 PM EDT         Patient is at low to intermediate risk from the procedure  Xarelto may be held 24 to 48 hours if needed from surgical standpoint  Otherwise continue Xarelto latasha-operatively  No pre-op cardiac testing needed     Electronically signed by Lauren Gao MD at 08/01/2020 11:18 AM EDT       echo 2020 feb  Normal left ventricular wall thickness      Normal left ventricular chamber size  Normal left ventricular systolic     function  Normal regional wall motion  Severely increased septal wall     thickness  Pt has known HCM   Estimated left ventricular ejection fraction     is 65%   No evidence of significant LVOT obstruction  Severely dilated left atrium      Mitral annular calcification  Thickened mitral valve leaflets  No mitral     stenosis  No mitral regurgitation      Aortic sclerosis without stenosis  No aortic regurgitation      Normal right ventricular size and function       Normal right atrial size       Structurally normal tricuspid valve without significant stenosis or     regurgitation        Visually Estimated LV Ejection Fraction is:       Lab Results   Component Value Date    HGBA1C 6 8 (H) 06/12/2020       Lab Results   Component Value Date     08/16/2014    K 3 9 02/17/2020     02/17/2020    CO2 29 02/17/2020    ANIONGAP 5 08/16/2014    BUN 12 02/17/2020    CREATININE 0 64 02/17/2020    GLUCOSE 122 08/16/2014    GLUF 96 02/20/2019    CALCIUM 9 1 02/17/2020    AST 9 02/14/2020    ALT 15 02/14/2020    ALKPHOS 105 02/14/2020    PROT 6 8 07/15/2014    BILITOT 0 4 07/15/2014    EGFR 98 02/17/2020       Lab Results   Component Value Date    WBC 7 18 02/17/2020    HGB 13 1 02/17/2020    HCT 42 0 02/17/2020    MCV 94 02/17/2020     02/17/2020   cbc and cmp from 8/25 reviewed

## 2020-09-16 NOTE — PRE-PROCEDURE INSTRUCTIONS
Pre-Surgery Instructions:   Medication Instructions    acetaminophen (TYLENOL) 650 mg CR tablet Instructed patient per Anesthesia Guidelines   allopurinol (ZYLOPRIM) 300 mg tablet Instructed patient per Anesthesia Guidelines   aspirin 81 MG tablet Instructed patient per Anesthesia Guidelines   atorvastatin (LIPITOR) 40 mg tablet Instructed patient per Anesthesia Guidelines   Bimatoprost (LUMIGAN OP) Instructed patient per Anesthesia Guidelines   brimonidine (ALPHAGAN P) 0 15 % ophthalmic solution Instructed patient per Anesthesia Guidelines   Capsaicin-Menthol (Salonpas Pain Rel Gel-Ptch Hot) 0 025-1 25 % PTCH Instructed patient per Anesthesia Guidelines   carbidopa-levodopa (SINEMET)  mg per tablet Instructed patient per Anesthesia Guidelines   cholecalciferol 1000 units tablet Instructed patient per Anesthesia Guidelines   cyanocobalamin 1000 MCG tablet Instructed patient per Anesthesia Guidelines   FOLIC ACID PO Instructed patient per Anesthesia Guidelines   furosemide (LASIX) 20 mg tablet Instructed patient per Anesthesia Guidelines   glucose blood test strip Instructed patient per Anesthesia Guidelines   magnesium oxide (MAG-OX) 400 mg Instructed patient per Anesthesia Guidelines   metFORMIN (GLUCOPHAGE) 1000 MG tablet Instructed patient per Anesthesia Guidelines   metoprolol succinate (TOPROL-XL) 100 mg 24 hr tablet Instructed patient per Anesthesia Guidelines   Multiple Vitamin (MULTIVITAMIN) tablet Instructed patient per Anesthesia Guidelines   polyethylene glycol (MIRALAX) 17 g packet Instructed patient per Anesthesia Guidelines   potassium chloride (K-DUR,KLOR-CON) 20 mEq tablet Instructed patient per Anesthesia Guidelines   rivaroxaban (XARELTO) 20 mg tablet Instructed patient per Anesthesia Guidelines   rivastigmine (EXELON) 1 5 mg capsule Instructed patient per Anesthesia Guidelines      ACE/ARB Med Class   Continue this medication up to the evening before surgery/procedure, but do not take the morning of the day of surgery  Diuretic Med Class   Continue this medication up to the evening before surgery/procedure, but do not take the morning of the day of surgery  Acetaminophen Med Class   Continue to take this medication on your normal schedule  If this is an oral medication and you take it in the morning, then you may take this medicine with a sip of water  AcetylCholinesterase inhibitors Med Class   Continue to take this medication on your normal schedule  If this is an oral medication and you take it in the morning, then you may take this medicine with a sip of water  Alpha-1 adrenergic blocker Med Class   Continue to take this medication on your normal schedule  If this is an oral medication and you take it in the morning, then you may take this medicine with a sip of water  Anti-gout Med Class   Continue to take this medication on your normal schedule  If this is an oral medication and you take it in the morning, then you may take this medicine with a sip of water  Antiparkinsons Med Class   Continue to take this medication on your normal schedule  If this is an oral medication and you take it in the morning, then you may take this medicine with a sip of water  ASA Med Class: Aspirin   Should be discontinued at least one week prior to planned operation, unless specifically stated otherwise by surgical service  Your Surgeon may have patient stop taking aspirin up to a week before surgery if having intracranial, middle ear, posterior eye, spine surgery or prostate surgery  [Patients taking aspirin for coronary stents should be reviewed by an anesthesiologist in the optimization clinic  Please do not discontinue aspirin in patients with coronary stents unless given specific permission to do so by the cardiologist who prescribed medication ]   If your surgeon approves please continue to take this medication on your normal schedule  You may take this medication on the morning of your surgery with a sip of water  Beta blocker Med Class   Continue to take this heart medication on your normal schedule  If this is an oral medication and you take it in the morning, then you may take this medicine with a sip of water  Direct Xa Inhibitor Med Class   Stop taking this medication at least 3 days prior to surgery/procedure with prescribing Physician and Surgeon consultation  Insulin Med Class   Pre-Surgery/Procedure Instructions for Adult Patients who Take Medicine for Diabetes or to Control their Blood Sugar   Day Before Surgery/Procedure  Use the directions based on the type of medicine you take for your diabetes  1  If you are having a procedure that does not require a bowel prep:  ? Pre-Mixed Insulin (Intermediate Acting: Humalog 75/25, Humulin 70/30  Novolog 70/30, Regular Insulin)  § Take ½ your regular dose the evening before your procedure  ? Rapid/Fast Acting Insulin/Long Acting Insulin (Humalog U200, NovoLog, Apidra, Lantus, Levemir, Sharolyn Bur, [place])  § Take your FULL regular dose the day before procedure  ? Oral Diabetic Medicines including Glipizide/Glimepiride/Glucotrol (sulfonylurea)  § Take your regular dose with dinner the evening before your procedure  2  If you are having a procedure (e g  Colonoscopy) that requires a bowel prep and you are allowed to have at least a clear liquid diet:  ? Pre-Mixed Insulin (Intermediate Acting: Humalog 75/25, Humulin 70/30, Novolog 70/30, Regular Insulin)  § Take ½ your regular dose the evening before your procedure  ? Rapid/Fast Acting Insulin (Humalog U200, NovoLog, Apidra, Fiasp)  § Take ½ your regular dose the evening before your procedure  ? Long Acting Insulin (Lantus, Levemir, Sharolyn Bur)  § Take your FULL regular dose the day before procedure  ? Oral Glipizide/Glimepiride/Glucotrol (sulfonylurea)  § Take ½ your regular dose the evening before your procedure    ? Oral Diabetic Medicines that are NOT Glipizide/Glimepiride/Glucotrol  § Take your regular dose with dinner in the evening before your procedure    Day of Surgery/Procedure  · Long Acting Insulin (Lantus, Levemir, Esther Damico)  ? If you usually take your Long-Acting Insulin in the morning, take the full dose as scheduled  · With the exception of the morning Long-Acting Insulin noted above, DO NOT take ANY diabetic medicine on the day of your procedure unless you were instructed by the doctor who manages your diabetic medicines  · Continue to check your blood sugars  · If you have an insulin pump then consult with your Endocrinologist for instructions  · If you cannot see your Endocrinologist, on the day of the procedure set your insulin pump to your basal rate only  Please bring your insulin pump supplies to the hospital    This Educational material has been approved by the Patient Education Advisory Committee  Date prepared: 1/17/2018          Expiration date: 1/17/2019        Approval Number:         Statin Med Class    Continue to take this medication on your normal schedule  If this is an oral medication and you take it in the morning, then you may take this medicine with a sip of water  Vitamin Med Class   You may continue to take any vitamin that your surgeon has prescribed to you up to the day before surgery  If your surgeon has not specifically prescribed this vitamin or instructed you to continue then stop taking 7 days prior to surgery  Phone assessment completed with verbal understanding   NPO MN including gum and candy, shower this pm , am meds per protocol, insurance card and photo ID,  for discch home and preop call from [facility] this pm

## 2020-09-17 ENCOUNTER — HOSPITAL ENCOUNTER (OUTPATIENT)
Facility: HOSPITAL | Age: 70
Setting detail: OUTPATIENT SURGERY
Discharge: HOME | End: 2020-09-17
Attending: STUDENT IN AN ORGANIZED HEALTH CARE EDUCATION/TRAINING PROGRAM | Admitting: STUDENT IN AN ORGANIZED HEALTH CARE EDUCATION/TRAINING PROGRAM
Payer: MEDICARE

## 2020-09-17 ENCOUNTER — ANESTHESIA (OUTPATIENT)
Dept: PERIOP | Facility: HOSPITAL | Age: 70
End: 2020-09-17
Payer: MEDICARE

## 2020-09-17 VITALS
WEIGHT: 300 LBS | HEART RATE: 70 BPM | HEIGHT: 77 IN | OXYGEN SATURATION: 97 % | BODY MASS INDEX: 35.42 KG/M2 | DIASTOLIC BLOOD PRESSURE: 77 MMHG | TEMPERATURE: 97.6 F | SYSTOLIC BLOOD PRESSURE: 131 MMHG | RESPIRATION RATE: 20 BRPM

## 2020-09-17 VITALS — HEART RATE: 70 BPM

## 2020-09-17 DIAGNOSIS — D48.5 NEOPLASM OF UNCERTAIN BEHAVIOR OF SKIN: ICD-10-CM

## 2020-09-17 PROBLEM — E11.9 DIABETES MELLITUS, TYPE 2 (HCC): Status: ACTIVE | Noted: 2020-09-17

## 2020-09-17 LAB
GLUCOSE SERPL-MCNC: 107 MG/DL (ref 65–140)
GLUCOSE SERPL-MCNC: 117 MG/DL (ref 65–140)

## 2020-09-17 PROCEDURE — 88332 PATH CONSLTJ SURG EA ADD BLK: CPT | Performed by: PATHOLOGY

## 2020-09-17 PROCEDURE — 82948 REAGENT STRIP/BLOOD GLUCOSE: CPT

## 2020-09-17 PROCEDURE — 88305 TISSUE EXAM BY PATHOLOGIST: CPT | Performed by: PATHOLOGY

## 2020-09-17 PROCEDURE — 88331 PATH CONSLTJ SURG 1 BLK 1SPC: CPT | Performed by: PATHOLOGY

## 2020-09-17 RX ORDER — PROPOFOL 10 MG/ML
INJECTION, EMULSION INTRAVENOUS AS NEEDED
Status: DISCONTINUED | OUTPATIENT
Start: 2020-09-17 | End: 2020-09-17

## 2020-09-17 RX ORDER — LIDOCAINE HYDROCHLORIDE AND EPINEPHRINE 10; 10 MG/ML; UG/ML
INJECTION, SOLUTION INFILTRATION; PERINEURAL AS NEEDED
Status: DISCONTINUED | OUTPATIENT
Start: 2020-09-17 | End: 2020-09-17 | Stop reason: HOSPADM

## 2020-09-17 RX ORDER — FENTANYL CITRATE 50 UG/ML
INJECTION, SOLUTION INTRAMUSCULAR; INTRAVENOUS AS NEEDED
Status: DISCONTINUED | OUTPATIENT
Start: 2020-09-17 | End: 2020-09-17

## 2020-09-17 RX ORDER — GINSENG 100 MG
CAPSULE ORAL AS NEEDED
Status: DISCONTINUED | OUTPATIENT
Start: 2020-09-17 | End: 2020-09-17 | Stop reason: HOSPADM

## 2020-09-17 RX ORDER — SODIUM CHLORIDE, SODIUM LACTATE, POTASSIUM CHLORIDE, CALCIUM CHLORIDE 600; 310; 30; 20 MG/100ML; MG/100ML; MG/100ML; MG/100ML
50 INJECTION, SOLUTION INTRAVENOUS CONTINUOUS
Status: DISCONTINUED | OUTPATIENT
Start: 2020-09-17 | End: 2020-09-17 | Stop reason: HOSPADM

## 2020-09-17 RX ORDER — LIDOCAINE HYDROCHLORIDE 10 MG/ML
INJECTION, SOLUTION EPIDURAL; INFILTRATION; INTRACAUDAL; PERINEURAL AS NEEDED
Status: DISCONTINUED | OUTPATIENT
Start: 2020-09-17 | End: 2020-09-17

## 2020-09-17 RX ORDER — CEFAZOLIN SODIUM 2 G/50ML
2000 SOLUTION INTRAVENOUS ONCE
Status: COMPLETED | OUTPATIENT
Start: 2020-09-17 | End: 2020-09-17

## 2020-09-17 RX ORDER — PROPOFOL 10 MG/ML
INJECTION, EMULSION INTRAVENOUS CONTINUOUS PRN
Status: DISCONTINUED | OUTPATIENT
Start: 2020-09-17 | End: 2020-09-17

## 2020-09-17 RX ORDER — MAGNESIUM HYDROXIDE 1200 MG/15ML
LIQUID ORAL AS NEEDED
Status: DISCONTINUED | OUTPATIENT
Start: 2020-09-17 | End: 2020-09-17 | Stop reason: HOSPADM

## 2020-09-17 RX ORDER — HYDROMORPHONE HCL/PF 1 MG/ML
0.4 SYRINGE (ML) INJECTION
Status: DISCONTINUED | OUTPATIENT
Start: 2020-09-17 | End: 2020-09-17 | Stop reason: HOSPADM

## 2020-09-17 RX ORDER — METOPROLOL SUCCINATE 25 MG/1
50 TABLET, EXTENDED RELEASE ORAL ONCE
Status: COMPLETED | OUTPATIENT
Start: 2020-09-17 | End: 2020-09-17

## 2020-09-17 RX ADMIN — CEFAZOLIN SODIUM 2000 MG: 2 SOLUTION INTRAVENOUS at 09:00

## 2020-09-17 RX ADMIN — FENTANYL CITRATE 100 MCG: 50 INJECTION INTRAMUSCULAR; INTRAVENOUS at 09:21

## 2020-09-17 RX ADMIN — PROPOFOL 20 MG: 10 INJECTION, EMULSION INTRAVENOUS at 09:21

## 2020-09-17 RX ADMIN — PROPOFOL 70 MCG/KG/MIN: 10 INJECTION, EMULSION INTRAVENOUS at 09:24

## 2020-09-17 RX ADMIN — HYDROMORPHONE HYDROCHLORIDE 0.4 MG: 1 INJECTION, SOLUTION INTRAMUSCULAR; INTRAVENOUS; SUBCUTANEOUS at 10:07

## 2020-09-17 RX ADMIN — LIDOCAINE HYDROCHLORIDE 50 MG: 10 INJECTION, SOLUTION EPIDURAL; INFILTRATION; INTRACAUDAL; PERINEURAL at 09:21

## 2020-09-17 RX ADMIN — SODIUM CHLORIDE, SODIUM LACTATE, POTASSIUM CHLORIDE, AND CALCIUM CHLORIDE: .6; .31; .03; .02 INJECTION, SOLUTION INTRAVENOUS at 09:00

## 2020-09-17 RX ADMIN — METOPROLOL SUCCINATE 50 MG: 25 TABLET, EXTENDED RELEASE ORAL at 08:40

## 2020-09-17 NOTE — DISCHARGE SUMMARY
Discharge Summary    Wilfrid Mendoza 65 y o  male MRN: [id number]  Unit/Bed#: OR POOL Encounter: [id number]    Admission/Discharge Date: 9/17/20    Admitting Diagnosis: Neoplasm of uncertain behavior of skin [D48 5]    HPI: 65y o  year old male presents with SCC of the left forehead who presents for definitive excision  Procedures Performed: Excision of left forehead lesion    Hospital Course: 65y o  year old male presented for and underwent the aforementioned procedure  His postoperative course was uncomplicated  He was tolerating PO and pain was well controlled with oral medications  He was amenable to dismissal home to the care of his family  Complications: None    Discharge Diagnosis: SCC of left forehead      Condition at Discharge: stable     Discharge instructions/Information to patient and family:   See after visit summary for information provided to patient and family  Provisions for Follow-Up Care:  See after visit summary for information related to follow-up care and any pertinent home health orders  Disposition: Home      Planned Readmission: No    Discharge Statement   I spent 15 minutes discharging the patient  This time was spent on the day of discharge  I had direct contact with the patient on the day of discharge  Additional documentation is required if more than 30 minutes were spent on discharge  Discharge Medications:  See after visit summary for reconciled discharge medications provided to patient and family

## 2020-09-17 NOTE — NURSING NOTE
Stated that he felt lightheaded when he got up into the chair  VSS  133/77- 70- 20 97% on room air  Given more juice and crackers  Will recheck VS  Discharge instructions were given

## 2020-09-17 NOTE — ANESTHESIA POSTPROCEDURE EVALUATION
Post-Op Assessment Note    CV Status:  Stable  Pain Score: 0    Pain management: adequate     Mental Status:  Alert and awake   Hydration Status:  Euvolemic   PONV Controlled:  Controlled   Airway Patency:  Patent      Post Op Vitals Reviewed: Yes      Staff: CRNA         No complications documented      BP   140/71   Temp   97 4   Pulse   70   Resp   24   SpO2   98

## 2020-09-17 NOTE — OP NOTE
OPERATIVE REPORT  PATIENT NAME: Blaine Thompson    :  1950  MRN: [id number]  Pt Location:  OR ROOM 12    SURGERY DATE: 2020    Surgeon(s) and Role:     * Calli Brower DO - Primary    Preop Diagnosis:  Neoplasm of uncertain behavior of skin [D48 5]    Post-Op Diagnosis Codes: * Neoplasm of uncertain behavior of skin [D48 5]    Procedure(s) (LRB):  EXCISION LESION OF FOREHEAD W/ FROZEN SECTION (Left)    Specimen(s):  ID Type Source Tests Collected by Time Destination   1 : LEFT FOREHEAD LESION, SHORT SUPERIOR, LEFT LONG LATERAL Tissue Lesion TISSUE EXAM Calli Brower DO 2020 0927        Estimated Blood Loss:   Minimal    Drains:  None    Anesthesia Type:   IV Sedation with Anesthesia    Operative Indications:  64 yo male with biopsy proven SCC of the left forehead requiring excision with margins  Operative Findings:  3 6 x 1 7 cm defect with 4 mm margins; clear margins on frozen specimen    Complications:   None    Patient Disposition:  PACU     Procedure and Technique:    The patient was seen preoperatively  The procedure, risks, benefits and alternatives were discussed  Informed consent was obtained  The patient was site marked preoperatively  The patient was brought to the operating room where they were positioned supine with all of their pressure points appropriately padded  Anesthesia commenced  A timeout was performed and verified  The patient was prepped and draped in usual sterile fashion  The area was infiltrated with local anesthesia  An incision was made sharply surrounding the lesion with appropriate margins  It was dissected from underlying tissue sharply  The lesion was sent for frozen pathology  The wound was irrigated copiously and hemostasis was obtained  Once confirmed the margins were negative on frozen pathology, closure commenced  The remaining wound was closed in layers, using 4-0 PDS for the deep dermis and 6-0 nylon to approximate the skin  The area was cleansed and bacitrain was applied  The instrument, sponge and needle count was correct an verified prior to completion of the case  The patient emerged from anesthesia and was transferred to the recovery room in stable condition          SIGNATURE: Ken Rai DO  DATE: September 17, 2020  TIME: 10:02 AM

## 2020-09-17 NOTE — H&P
H&P Exam - Randall Carl 65 y o  male MRN: [id number]    Unit/Bed#: OR POOL Encounter: [id number]    Assessment and Plan:  Left forehead SCC,    Excision required with 4mm margins    History of Present Illness   66 yo male with biopsy proven SCC     Review of Systems    Historical Information   Past Medical History:   Diagnosis Date    A-fib ([facility] )     Arthritis     Atrial flutter ([place] [address] )     Balance problems     sometimes    BPH (benign prostatic hyperplasia)     [facility]     Chronic pain disorder     shoulder    Coronary artery disease     cardiac myopathy    Diabetes mellitus ([place] [address] )     type 2    Diabetic neuropathy (HCC)     bilat feet    Glaucoma     bilat    Gout     Hard of hearing     doesn t use his hearing aids    Heart murmur     aortic    Hiatal hernia     History of total right knee replacement     History of total shoulder replacement     left    Hyperlipidemia     Hypertension     Irregular heart beat     Knee pain, left     Knee pain, right     LBBB (left bundle branch block)     Obesity     Parkinson disease ([place] [address] )     Sleep apnea     Use of cane as ambulatory aid     Wears glasses      Past Surgical History:   Procedure Laterality Date    CARDIAC CATHETERIZATION      x2    CARDIAC CATHETERIZATION      CARDIAC DEFIBRILLATOR PLACEMENT      CARDIAC ELECTROPHYSIOLOGY STUDY AND ABLATION      CARDIOVERSION      COLONOSCOPY      COLONOSCOPY N/A 7/15/2016    Procedure: COLONOSCOPY;  Surgeon: Orly Chapman MD;  Location: [facility];   Service:     EYE SURGERY      laser eye surgery    FOOT SURGERY Left     HIATAL HERNIA REPAIR      INSERT / REPLACE / REMOVE PACEMAKER      IR LUMBAR PUNCTURE  2/17/2020    JOINT REPLACEMENT      left shoulder/right knee    ID REVISE KNEE JOINT REPLACE,1 PART Right 4/21/2017    Procedure: KNEE REVISION PARTIAL VERSUS COMPLETE REVISION;  Surgeon: Hansel Rodriguez MD;  Location: [facility];  Service: Orthopedics  WISDOM TOOTH EXTRACTION       Social History   Social History     Substance and Sexual Activity   Alcohol Use Yes    Frequency: 2-3 times a week    Drinks per session: 1 or 2    Binge frequency: Never    Comment: socially     Social History     Substance and Sexual Activity   Drug Use Never     Social History     Tobacco Use   Smoking Status Never Smoker   Smokeless Tobacco Never Used     E-Cigarette Use: Never User     E-Cigarette/Vaping Substances    Nicotine No     THC No     CBD No     Flavoring No     Other No     Unknown No        Family History: non-contributory    Meds/Allergies   all medications and allergies reviewed  No Known Allergies    Objective   First Vitals:   Blood Pressure: 124/71 (09/17/20 0834)  Pulse: 74 (09/17/20 0834)  Temperature: 97 6 °F (36 4 °C) (09/17/20 0834)  Respirations: 16 (09/17/20 0834)  Height: 6' 5" (195 6 cm) (09/17/20 0834)  Weight - Scale: (!) 137 kg (303 lb) (09/16/20 0904)  SpO2: 94 % (09/17/20 0834)    Current Vitals:   Blood Pressure: 124/71 (09/17/20 0834)  Pulse: 74 (09/17/20 0834)  Temperature: 97 6 °F (36 4 °C) (09/17/20 0834)  Respirations: 16 (09/17/20 0834)  Height: 6' 5" (195 6 cm) (09/17/20 0834)  Weight - Scale: 136 kg (300 lb) (09/17/20 0834)  SpO2: 94 % (09/17/20 0834)    No intake or output data in the 24 hours ending 09/17/20 0857    Invasive Devices     None                 Physical Exam    Ulcerated left forehead lesion  NAD  Nonlabored  RRR  Soft, ND, NT  No gross deformities or deficits    Code Status: Prior  Advance Directive and Living Will: Yes    Power of :    POLST:

## 2020-09-17 NOTE — NURSING NOTE
Received from PACU at 1035  Alert and oriented  Denies pain at surgical site on forehead  Dressing dry and intact  No drainage  Does have chronic pain in shoulders  Repositioned in bed and pain decreased  Respirations easy and non labored  IV fluids continue  Call bell in reach

## 2020-09-18 DIAGNOSIS — G20 PARKINSON'S DISEASE (HCC): ICD-10-CM

## 2020-09-18 DIAGNOSIS — G20 PARKINSON'S DISEASE (HCC): Primary | ICD-10-CM

## 2020-09-18 RX ORDER — RIVASTIGMINE TARTRATE 1.5 MG/1
1.5 CAPSULE ORAL 2 TIMES DAILY
Qty: 60 CAPSULE | Refills: 1 | OUTPATIENT
Start: 2020-09-18

## 2020-09-18 RX ORDER — RIVASTIGMINE TARTRATE 3 MG/1
3 CAPSULE ORAL 2 TIMES DAILY
Qty: 60 CAPSULE | Refills: 2 | Status: SHIPPED | OUTPATIENT
Start: 2020-09-18 | End: 2020-12-15

## 2020-12-11 ENCOUNTER — TELEPHONE (OUTPATIENT)
Dept: NEUROLOGY | Facility: CLINIC | Age: 70
End: 2020-12-11

## 2020-12-15 ENCOUNTER — OFFICE VISIT (OUTPATIENT)
Dept: NEUROLOGY | Facility: CLINIC | Age: 70
End: 2020-12-15
Payer: MEDICARE

## 2020-12-15 VITALS
WEIGHT: 303 LBS | HEART RATE: 70 BPM | BODY MASS INDEX: 35.78 KG/M2 | DIASTOLIC BLOOD PRESSURE: 78 MMHG | HEIGHT: 77 IN | SYSTOLIC BLOOD PRESSURE: 150 MMHG

## 2020-12-15 DIAGNOSIS — G20 PARKINSON'S DISEASE (HCC): Primary | ICD-10-CM

## 2020-12-15 DIAGNOSIS — G20 PARKINSON'S DISEASE (HCC): ICD-10-CM

## 2020-12-15 PROCEDURE — 99214 OFFICE O/P EST MOD 30 MIN: CPT | Performed by: PSYCHIATRY & NEUROLOGY

## 2020-12-15 RX ORDER — RIVASTIGMINE TARTRATE 3 MG/1
3 CAPSULE ORAL 2 TIMES DAILY
Qty: 180 CAPSULE | Refills: 3 | Status: SHIPPED | OUTPATIENT
Start: 2020-12-15 | End: 2020-12-15

## 2020-12-15 RX ORDER — RIVASTIGMINE 9.5 MG/24H
1 PATCH, EXTENDED RELEASE TRANSDERMAL DAILY
Qty: 30 PATCH | Refills: 5 | Status: SHIPPED | OUTPATIENT
Start: 2020-12-15 | End: 2021-08-10

## 2021-01-19 ENCOUNTER — TELEPHONE (OUTPATIENT)
Dept: NEUROLOGY | Facility: CLINIC | Age: 71
End: 2021-01-19

## 2021-01-19 NOTE — TELEPHONE ENCOUNTER
Pt's dtr called and states pt fell last month  He fell at home and broke his hip  He was admitted to [facility], was discharged to [facility]  States that pt has been extremely confused, more in the evening  He does not know where he is at  She was not able give me additional info bec they were not allowed to visit this pt  States that pt's PCP does not know pt was on rIvastigmine  She is unsure if pt is on this med at [facility]  PCP also increased pt's Metoprolol from 50 mg to 100 mg daily  She believes that this med cause pt's increased confusion  They are waiting a call back from pcp  Called [facility] at [phone], spoke w/ Elvira Daiz  I was transferred to [address] [place] [address]  Spoke w/ Rachel Buck and states that pt's nurse Kiana Ho is with the another pt right now  She will have Lala call us back at [phone]  Clinical team: need pt's current med list  Any parkinson's symptoms? Need more info re: confusion                 [phone] Pt's wife Hay Rea [facility]) ok to leave detailed message

## 2021-01-27 NOTE — TELEPHONE ENCOUNTER
Noelle at [phone], spoke w/ Saman and states that she is on the other line   She will call us back at [phone]

## 2021-01-29 NOTE — TELEPHONE ENCOUNTER
Called [facility] Zhang [phone], spoke w/ Rocio Ashley and advised of the below  States that pt does have confusion but pretty much alert  Pt is able to make his needs known, can answer questions appropriately  Hand tremors, worse in the morning    Freezing? Unknown  Per Rocio Ashley, pt does not walk much  Pt does have PT daily    Shuffling? No    Difficulty Walking? Yes- walker w/2 assist      Recent Falls? No    Any extra movements? No    Any Hallucinations? No    What time of day are symptoms worse? 0700      Current meds  Sinemet 25/100 mg 3 tabs tid ([id number])  rivastigmine 9 5 mg patch daily   No missed dose

## 2021-03-20 DIAGNOSIS — G20 PARKINSON'S DISEASE (HCC): ICD-10-CM

## 2021-03-22 ENCOUNTER — TELEPHONE (OUTPATIENT)
Dept: NEUROLOGY | Facility: CLINIC | Age: 71
End: 2021-03-22

## 2021-03-22 NOTE — TELEPHONE ENCOUNTER
Pt's wife called c/o worsening parkinson's symptoms    Freezing? Yes-constant  Ongoing but worsening  Shuffling? Yes-Yes-constant  Ongoing but worsening  Difficulty Walking? Yes  Pt uses a walker  At times, pt uses a WC    Recent Falls? States that pt fell in Dec, broke his pelvis  Pt is getting in home  PT 2-3x/week  Within the last week, pt had fallen about 3 times  All 3 falls, pt was waking using his walker and loss his balance and fell on his buttock  It was unwitnessed falls  Denies hitting head head  No injury noted per pt's wife    Any extra movements? No    Any Hallucinations? No     What time of day are symptoms worse? It starts about 0900 and it gets worse as the day goes on  Current medications confirmed as:  Sinemet 25/100, 3 tab tid @ 0800, between 2pm-3pm, 6 pm   Rivastigmine 9 5 mg 1 patch daily    Ask how long after each dose do they feel that it "wears off"? States that she is unsure bec pt is sitting most of the day     Does patient have a DBS? No    Pt has upcoming appt w/ you on 9/14/21  Requesting sooner appt  Placed on cancellation list  No preferred location  [facility], can you accommodate this pt to [facility] schedule?          [phone] ok to leave detailed message

## 2021-03-29 NOTE — TELEPHONE ENCOUNTER
PA approved through 3/24/2022    Called and left a detailed message on [facility] pharmacy's answering machine that rivastigmine 9 5 mg patch has been approved through 3/24/22  Call back if any further questions  Previous script has avail refills left      Pls cancel pending script below

## 2021-03-30 RX ORDER — RIVASTIGMINE 9.5 MG/24H
PATCH, EXTENDED RELEASE TRANSDERMAL
Qty: 30 PATCH | Refills: 5 | OUTPATIENT
Start: 2021-03-30

## 2021-04-08 NOTE — TELEPHONE ENCOUNTER
Has he had any recent therapy? This would likely be the best option for his continued gait issues and falls  If amendable I can place the order  Thanks!

## 2021-04-08 NOTE — ASSESSMENT & PLAN NOTE
As above, parkinsonism has been diagnosed  We proceed with rehabilitation therapies to improve function and gradually adjusting the dose of Sinemet and monitoring for its affect  He is currently on 25/100 strength pills, 1 pill 3 times per day  He has tolerated the medication  He notices improvement in his speech  He states that other people notice more significant improvement in his status, then he notices himself  We continue the medications for several days and then increase the dose as directed by Neurology  Gait quality is subjectively improved  His face is more animated during conversation and his voice is louder  show

## 2021-04-13 NOTE — TELEPHONE ENCOUNTER
I do not think I would necessarily increase the Sinemet further unless there was a clear correlation between his symptoms and the timing of his medication  If the imbalance is not better when his medication is "on", about an hr after a dose, then I do not think that increasing would be of any real benefit  I think the therapy is certainly a good idea to continue with and he should be seen in the office to help determine if further changes should be made  We will sometimes uses a memory medication (Aricept or rivastigmine) which has been shown in some studies to help reduce falls  If he is not on one of these this could be tried in the future as well  Thanks!

## 2021-04-15 NOTE — TELEPHONE ENCOUNTER
Wife Francisco Kaur calling in to return our call  Read Joao's message  She verbalizes understanding of information  She will await appt for appropriate med decision  She also states the in-home therapy is almost our of visits (due to insurance)  Asking for referral to [address] [place] [address] rehab for "outpatient therapy at home"  She does not know where to fax this to  She has no other questions or concerns at this time  Called [facility] at [phone], but there was no answer  Looked at their website and see "[facility] Admissions at [phone]" for referrals  Will fax there once ordered

## 2021-04-16 NOTE — TELEPHONE ENCOUNTER
MSW initiated a [facility] (OUTPATIENT CAMP[facility]) Referral Form for this patient  Form emailed to you/[facility] Orozco, you will need to complete the highlighted areas and sign/date form  Completed form can be returned to me so that referral can be faxed along with necessary chart notes/demographics

## 2021-04-20 NOTE — TELEPHONE ENCOUNTER
MSW received completed/signed [facility] Referral form from [facility]  MSW forwarded referral form to Samy Barney  [facility] confirmed that they have all that they need for the referral  Patient's wife made aware that the referral was sent to [facility] (OUTPATIENT C[facility]) this date and that they will be checking coverage/contacting patient to schedule the first visit  MSW will be available to patient/wife as needed  While on the phone, patient's wife mentioned that patient is currently scheduled to be seen in our office in September, but that she is hoping that patient can be seen sooner  MSW confirmed with MR Armin/ for the Movement Disorder Team that patient is on the cancellation list  [place] stated that patient/wife are more than welcome to call in as often as they like to see if there is any availability  MSW phoned patient's wife back to relay above message from [place]

## 2021-04-22 NOTE — TELEPHONE ENCOUNTER
Form signed by Tayla Armenta, and scanned into patient's chart  Form has been emailed back to you for faxing  I will keep any eye out for any open availabilities for anything sooner with Tayla Armenta  Thanks!

## 2021-04-26 ENCOUNTER — TELEPHONE (OUTPATIENT)
Dept: NEUROLOGY | Facility: CLINIC | Age: 71
End: 2021-04-26

## 2021-04-26 NOTE — TELEPHONE ENCOUNTER
Spoke with patient's wife, Sharon Gusmanluma - patient has been scheduled with Steffanie Nieves 4/27 @ 2:45p in [facility] -

## 2021-04-27 ENCOUNTER — OFFICE VISIT (OUTPATIENT)
Dept: NEUROLOGY | Facility: CLINIC | Age: 71
End: 2021-04-27
Payer: MEDICARE

## 2021-04-27 VITALS — SYSTOLIC BLOOD PRESSURE: 114 MMHG | DIASTOLIC BLOOD PRESSURE: 56 MMHG | HEART RATE: 70 BPM

## 2021-04-27 DIAGNOSIS — G20 PARKINSON'S DISEASE (HCC): Primary | ICD-10-CM

## 2021-04-27 PROCEDURE — 99215 OFFICE O/P EST HI 40 MIN: CPT | Performed by: PHYSICIAN ASSISTANT

## 2021-04-27 NOTE — PROGRESS NOTES
Patient ID: Ruperto Higgins is a 66 y o  male  Assessment/Plan:    Parkinson's disease [facility])  Patient with Parkinson's disease verses an atypical parkinsonian syndrome  It appears he had some initial response to Sinemet however it is unclear how effective the medication still is  One exam he continues to have prominent frontalis activation and mildly slowed vertical saccades  He is having more falls at home and this is becoming more of a burden for the family  Per the wife they have looked into assistance for the home in the past however they did not qualify  I have asked that she call in the future if she would like help from the  to see if there anything further that can be offered  For now will have him remain on his current dose of Sinemet  In the past there was some benefit with the addition of rivastigmine and he seems to be tolerating the patch well  Will try and increase the dose further to 13 3mg patches when he is due for his next refill  He was encouraged to remain active and continue with fall precautions  He is working with home PT  He did have an eye patch in the past and his wife will look for this at home given he continues to have bothersome diplopia  Subjective:    Marvin Castro is a 66year-old man with AFib, hypertrophic cardiomyopathy, ERROL and diabetes with diabetic neuropathy who presents in follow-up for Parkinsonism (iPD vs atypical parkinsonism)  To review, symptom onset in 2016/2017 with bl thumb tremor and gait dysfunction  Course has been complicated by diplopia and concern for an atypical parkinsonian syndrome  He has been somewhat responsive to high doses of levodopa  At his last visit no changes were made other than switching to the exelon patch given difficulty remembering to take the pills  He was scheduled for neuropysch testing  INTERVAL HISTORY:  Patient had a fall in 12/20 and suffered a broken hip    He went to rehab following this and had increased confusion during that time  Patient now home  He resides with his wife and daughter  He also follows with [facility], last visit was 3/29/21  At that time no changes were made and he was to remain on Sinemet and rivastigmine  Per the note, their diagnosis is MSA  He has a hospital bed at home  He is impulsive and he will make poor decisions at times such as getting out of his bed and moving to his chair on his own in the middle of the night  He does have confusion at times  He will vivid dreams at times  He showers on his own and dresses on his own  He does struggle with this at times  He currently has [employer]  He uses a walker at home  He has recurrent falls at home  They have had to call for help to lift him back up at times  He has supervision at all times  He had speech therapy in the past  He will choke at times when he is trying to eat too much at one time  He just completed therapy for this a few weeks ago  No hallucinations  He will feel dizzy at times when he first stands, this does not last long for him  He has been seen by Neuro-ophthalmologist at [facility] [place] and was told that there was nothing further that they could do for him  He did have a patch in the past and per the wife he did not want to wear it  Current medications and timing:  Sinemet 25/100, 3 tab TID @ 8-9 1-2pm QHS (11-MN)  Rivastigmine 9 5mg patch          I personally reviewed and updated the ROS  Total time spent today was 45 minutes  Greater than 50% of total time was spent with the patient and / or family counseling and / or coordinating plan of care  Objective:    Blood pressure 114/56, pulse 70  Physical Exam  Constitutional:       Appearance: He is obese  HENT:      Right Ear: Hearing normal       Left Ear: Hearing normal    Eyes:      General: Lids are normal       Pupils: Pupils are equal, round, and reactive to light     Pulmonary:      Effort: Pulmonary effort is normal    Neurological:      Mental Status: He is alert  Neurological Exam  Mental Status  Alert  Oriented only to person  Cranial Nerves  CN III, IV, VI: Normal lids and orbits bilaterally  Pupils equal round and reactive to light bilaterally  CN V:  Right: Facial sensation is normal   Left: Facial sensation is normal on the left  CN VIII:  Right: Hearing is normal   Left: Hearing is normal   Decreased shoulder shrug more on the left   Dysconjugate gaze, slight exotropia of the left eye   Slightly slowed vertical saccades   Decreased blinking   Gait  Able to stand from the wheel chair using hands and grabbing the walker   Decreased stride length with shuffling   Stooped posture   Very slowed gait   UPDRS motor:                              Date:   8/13/20 4/27/21   Speech  1  2   Facial Expression  2  decreased blinking    Rigidity - Neck  0     Rigidity - Upper Extremity (Right)  2  2   Rigidity - Upper Extremity (Left)   1  2   Rigidity - Lower Extremity (Right)  1  1   Rigidity - Lower Extremity (Left)   1  1   Finger Taps (Right)   2  2   Finger Taps (Left)   2  2   Hand Movement (Right)  1  1   Hand Movement (Left)   1  1   Pronation/Supination (Right)  2  1   Pronation/Supination (Left)   1  1   Toe Tapping (Right) 2  2   Toe Tapping (Left) 2  2   Leg Agility (Right)  1  1   Leg Agility (Left)   1  1   Arising from Chair   2  3   Gait   3  3   Freezing of Gait 0  0   Postural Stability         Posture 2  2   Global spontaneity of movement 2  2   Postural Tremor (Right) 1  0   Postural Tremor (Left) 0  0   Kinetic Tremor (Right)  0  0   Kinetic Tremor (Left)  0  0   Rest tremor amplitude RUE 0  0   Rest tremor amplitude LUE 0  0   Rest tremor amplitude RLE 0  0   Reset tremor amplitude LLE 0  0   Lip/Jaw Tremor  0     Consistency of tremor    0   Motor Exam Total:              ROS:    Review of Systems   Constitutional: Negative  Negative for appetite change and fever     HENT: Positive for trouble swallowing (Sometimes)  Negative for hearing loss, tinnitus and voice change  Eyes: Negative  Negative for photophobia and pain  Respiratory: Negative  Negative for shortness of breath  Cardiovascular: Negative  Negative for palpitations  Gastrointestinal: Negative  Negative for nausea and vomiting  Endocrine: Negative  Negative for cold intolerance  Genitourinary: Negative  Negative for dysuria, frequency and urgency  Musculoskeletal: Positive for gait problem, joint swelling (Shoulder), neck pain and neck stiffness  Negative for myalgias  Balance issues     Skin: Negative  Negative for rash  Allergic/Immunologic: Negative  Neurological: Positive for tremors (Sometimes in hands), speech difficulty and numbness (Feet)  Negative for dizziness, seizures, syncope, facial asymmetry, weakness, light-headedness and headaches  Hematological: Negative  Does not bruise/bleed easily  Psychiatric/Behavioral: Negative  Negative for confusion, hallucinations and sleep disturbance  All other systems reviewed and are negative

## 2021-04-27 NOTE — ASSESSMENT & PLAN NOTE
Patient with Parkinson's disease verses an atypical parkinsonian syndrome  It appears he had some initial response to Sinemet however it is unclear how effective the medication still is  One exam he continues to have prominent frontalis activation and mildly slowed vertical saccades  He is having more falls at home and this is becoming more of a burden for the family  Per the wife they have looked into assistance for the home in the past however they did not qualify  I have asked that she call in the future if she would like help from the  to see if there anything further that can be offered  For now will have him remain on his current dose of Sinemet  In the past there was some benefit with the addition of rivastigmine and he seems to be tolerating the patch well  Will try and increase the dose further to 13 3mg patches when he is due for his next refill  He was encouraged to remain active and continue with fall precautions  He is working with home PT  He did have an eye patch in the past and his wife will look for this at home given he continues to have bothersome diplopia

## 2021-05-12 ENCOUNTER — TELEPHONE (OUTPATIENT)
Dept: NEUROLOGY | Facility: CLINIC | Age: 71
End: 2021-05-12

## 2021-05-12 NOTE — TELEPHONE ENCOUNTER
Triston Press from [facility] in for PT POC  States they faxed this multiple times  Last faxed to us on 5/7  Informed him there are no faxes past 4/28   I advised he fax the info to [phone]

## 2021-05-14 NOTE — TELEPHONE ENCOUNTER
PT Initial Eval Plan of Care received  Placed in [place] folder for review and signature  Will give it to [facility] on Tues              [phone] ext   Fax [phone]

## 2021-05-18 NOTE — TELEPHONE ENCOUNTER
PT initial eval plan of care faxed to [address] [place] [address] at [phone] and placed in clerical bin to be scanned

## 2021-05-27 ENCOUNTER — TELEPHONE (OUTPATIENT)
Dept: NEUROLOGY | Facility: CLINIC | Age: 71
End: 2021-05-27

## 2021-05-27 NOTE — TELEPHONE ENCOUNTER
Received fax from [facility] requesting for OT order-referral    Contact# [phone]  Fax [phone]  OT form scanned  Team 6, can you pls print and ask Lillian Wu to review and sign?       Thanks

## 2021-06-29 DIAGNOSIS — G20 PARKINSON'S DISEASE (HCC): ICD-10-CM

## 2021-07-08 NOTE — ASSESSMENT & PLAN NOTE
Lab Results   Component Value Date    HGBA1C 5 9 09/28/2018       Recent Labs     02/13/19  1154 02/13/19  1634 02/13/19 2052 02/14/19  0603   POCGLU 93 108 112 95       Blood Sugar Average: Last 72 hrs:  (P) 115   Blood sugars are very well controlled on current regimen Detail Level: Detailed Biopsy Type: H and E Bill As?: Malignant Destruction Size Of Lesion In Cm (Optional): 0.6 Size Of Lesion After Curettage: 0.7 Anesthesia Type: 1% lidocaine with epinephrine Anesthesia Volume In Cc: 0.5 Hemostasis: Electrodesiccation Number Of Curettages: 3 Wound Care: Petrolatum Lab: 451 Lab Facility: [id number] Notes In Note?: No Consent: Verbal consent was obtained and risks were reviewed including but not limited to scarring, infection, bleeding, scabbing, incomplete removal, nerve damage and allergy to anesthesia. Render Post-Care Instructions In Note?: yes Post-Care Instructions: Patient will be notified of biopsy results. However, patient instructed to call the office if not contacted within 2 weeks. Written post-care instruction given.   If the biopsy results does reveal that the lesion is a non-melanoma skin cancer, the lesion has been treated at the time of the biopsy with EDC.  This is usually adequate treatment for most NMSC but the site will need to be recheck in 3-6 months to ensure adequacy of treatment as there is a 7 to 10% chance of recurrence.  If the biopsy shows a more aggressive pathology then expected then additional treatment beyond EDC may be needed. Notification Instructions: Patient will be notified of biopsy results. However, patient instructed to call the office if not contacted within 2 weeks. Billing Type: Third-Party Bill

## 2021-07-28 ENCOUNTER — TELEPHONE (OUTPATIENT)
Dept: NEUROLOGY | Facility: CLINIC | Age: 71
End: 2021-07-28

## 2021-07-28 NOTE — TELEPHONE ENCOUNTER
Spoke to wife  Reviewed previous message with her  Verbalized understanding  Agreeable to appt with Manny Arredondo  Scheduled follow up for 8/3/2021 to discuss medications

## 2021-07-28 NOTE — TELEPHONE ENCOUNTER
Unfortunately this is likely all part of disease progression  The gait and stability will certainly be affected and we do not have any medication that will stop this from getting worse  It was felt that he likely has an atypical parkinsonism which often means the medication is not as beneficial   Lets see if he can get in sooner with Clemmie Pallas for a follow up and perhaps she could talk about making a slight change to the Sinemet (1 5tabs qid then 2,1 5,2,1 5) to see if there is any benefit  Thanks!

## 2021-07-28 NOTE — TELEPHONE ENCOUNTER
Patient's wife Alberto Medico calling to report worsening sxs, overall decline in patient's sxs for the past few weeks  Patient's having more trouble walking, increased shuffling, patient feels like he just can't get around that well  He is seeing PT twice per week but he seems to be getting worse  Patient also c/o that he can't see well, wife reports that he does have glaucoma and double vision and doesn't always use his eye drops  Patient couldn't get off the toilet recently and they had to call 911  Denies any recent falls  Denies any recent med changes  No recent illness  He does use his walker  No missed meds  Sinemet 3 tabs TID  exelon 9 5 mg daily    Patient has an appt on 9/30 but wife thinks he needs to be seen asap  Please advise

## 2021-08-10 ENCOUNTER — OFFICE VISIT (OUTPATIENT)
Dept: NEUROLOGY | Facility: CLINIC | Age: 71
End: 2021-08-10
Payer: MEDICARE

## 2021-08-10 VITALS
BODY MASS INDEX: 35.93 KG/M2 | TEMPERATURE: 97.9 F | HEART RATE: 70 BPM | HEIGHT: 77 IN | DIASTOLIC BLOOD PRESSURE: 66 MMHG | SYSTOLIC BLOOD PRESSURE: 128 MMHG

## 2021-08-10 DIAGNOSIS — H53.2 DIPLOPIA: ICD-10-CM

## 2021-08-10 DIAGNOSIS — F02.80 DEMENTIA ASSOCIATED WITH OTHER UNDERLYING DISEASE WITHOUT BEHAVIORAL DISTURBANCE (HCC): ICD-10-CM

## 2021-08-10 DIAGNOSIS — R26.9 ABNORMAL GAIT: ICD-10-CM

## 2021-08-10 DIAGNOSIS — G20 PARKINSON'S DISEASE (HCC): Primary | ICD-10-CM

## 2021-08-10 PROCEDURE — 99214 OFFICE O/P EST MOD 30 MIN: CPT | Performed by: NURSE PRACTITIONER

## 2021-08-10 RX ORDER — RIVASTIGMINE 13.3 MG/24H
1 PATCH, EXTENDED RELEASE TRANSDERMAL DAILY
Qty: 90 PATCH | Refills: 2 | Status: SHIPPED | OUTPATIENT
Start: 2021-08-10 | End: 2022-06-02 | Stop reason: SDUPTHER

## 2021-08-10 NOTE — PROGRESS NOTES
Patient ID: Radha Hogue is a 66 y o  male  Assessment/Plan:    Parkinson's disease [facility])  Patient with Parkinson's disease versus an atypical parkinsonian syndrome  His notes from [facility] stated diagnosis of [address]  He did have some initial response to Sinemet, however it is unclear how effective the medications still is  He continues to have difficulty with his gait and balance and it has become increasingly difficult for his family to care for him in the home  Per the wife she has looked into assistance before and the either do not qualify for any assistance  She does have some contacts that she can look into as far as private duty  I did let her know to contact us in the future if she would like help from our   Patient's wife never increase the rivastigmine previously, so we will try to further increase this to 13 3 mg and see if this may help to improve his cognition and gait  He will remain on his current dose of Sinemet, however if he does not improve we could consider a slight increase to 1 5tabs qid (then 2,1 5,2,1 5)  He will continue PT/OT  I advised to restart melatonin  He continues to complain of diplopia, in which I recommended an eye patch, however his wife states he is not compliant with this  He had seen ophtho in the past in which there is nothing further that can be done  Subjective:    CHASE    Fito Atkins is a 66year-old man with AFib, hypertrophic cardiomyopathy, ERROL and diabetes with diabetic neuropathy who presents in follow-up for Parkinsonism (iPD vs atypical parkinsonism)  To review, symptom onset in 2016/2017 with bl thumb tremor and gait dysfunction  Course has been complicated by diplopia and concern for an atypical parkinsonian syndrome  He has been somewhat responsive to high doses of levodopa  He had a fall in December of 2020 in which he broke a hip  He did go to rehab at that time    He was last seen in the office in 4/2021 which it was noted that he was more impulsive and confused, requiring more assistance at home, ongoing diplopia  His rivastigmine patch was increased at that visit and was use an eye patch for diplopia  He does follow with [facility], last seen 3/29/21  Per their note their diagnosis is MSA  They did not make any medication changes  Current medications and timing:  Sinemet 25/100, 3 tab TID @ 8-9 1-2pm QHS (11-MN)  Rivastigmine 9 5mg patch     Interval History:  Wife called in last week as patient was getting worse  Difficulty with gait, his balance is worse, shuffling more-he has not been able to walk very far distances  He is very shaky using his walker, he does falls often, usually has a hard time getting up and she had to call 911 for help  He is getting PT/OT  He lives with his wife and daughter  He did not increase the rivastigmine patch at the last visit as he was still on his current refill  He sleeps a lot during the day  He does wake up confused sometimes and still thinks a dream is going on  He is easily redirected  No hallucinations  He used to take melatonin but has not been taking the last month  Good appetite  He has trouble swallowing things like nuts and potato chips but they avoid these, no trouble swallowing his normal foods  He needs help with showering, help putting on pants  Wife has looked into assistance in the home for him recently but they do not qualify and can't afford anything private duty  They have looked into [facility] if ever necessary for skilled care  He has supervision at all times  He has dizziness with he first stands, doesn't last long  He does not like to wear his eye patch-he has been seen by neuro-opthoalmologist at [facility] and there was nothing further that could be done for him  Objective:    Blood pressure 128/66, pulse 70, temperature 97 9 °F (36 6 °C), height 6' 5" (1 956 m)      Physical Exam  Constitutional: General: He is awake  HENT:      Right Ear: Hearing normal       Left Ear: Hearing normal    Eyes:      General: Lids are normal       Pupils: Pupils are equal, round, and reactive to light  Neurological:      Mental Status: He is alert  Neurological Exam  Mental Status  Awake and alert  Oriented only to person  Cranial Nerves  CN III, IV, VI: Normal lids and orbits bilaterally  Pupils equal round and reactive to light bilaterally  CN V:  Right: Facial sensation is normal   Left: Facial sensation is normal on the left  CN VIII:  Right: Hearing is normal   Left: Hearing is normal   Decreased shoulder shrug on the right due to shoulder pain  Dysconjugate gaze, slight exotropia of the left eye   Slightly slowed vertical saccades   Decreased blinking   Sensory  Light touch is normal in upper and lower extremities  Coordination  See UPDRS III  Some difficulty performing right sided FTN testing due to shoulder pain  Gait  Able to stand from the wheel chair using hands and grabbing the walker   Decreased stride length with shuffling   Stooped posture   Very slowed gait       UPDRS motor:                              Date:   8/13/20  4/27/21 8/10/21   Speech  1  2 2   Facial Expression  2  decreased blinking  2   Rigidity - Neck  0   0   Rigidity - Upper Extremity (Right)  2  2 2   Rigidity - Upper Extremity (Left)   1  2 2   Rigidity - Lower Extremity (Right)  1  1 1   Rigidity - Lower Extremity (Left)   1  1 1   Finger Taps (Right)   2  2 2   Finger Taps (Left)   2  2 2   Hand Movement (Right)  1  1 1   Hand Movement (Left)   1  1 1   Pronation/Supination (Right)  2  1 2   Pronation/Supination (Left)   1  1 1   Toe Tapping (Right) 2  2 2   Toe Tapping (Left) 2  2 2   Leg Agility (Right)  1  1 1   Leg Agility (Left)   1  1 1   Arising from Chair   2  3 3   Gait   3  3 3   Freezing of Gait 0  0 0   Postural Stability          Posture 2  2 2   Global spontaneity of movement 2  2 2   Postural Tremor (Right) 1  0 0   Postural Tremor (Left) 0  0 0   Kinetic Tremor (Right)  0  0 0   Kinetic Tremor (Left)  0  0 0   Rest tremor amplitude RUE 0  0 0   Rest tremor amplitude LUE 0  0 0   Rest tremor amplitude RLE 0  0 0   Reset tremor amplitude LLE 0  0 0   Lip/Jaw Tremor  0      Consistency of tremor    0 0   Motor Exam Total:               I have personally reviewed the ROS performed by the MA       ROS:    Review of Systems   Constitutional: Negative  Negative for appetite change and fever  HENT: Negative  Negative for hearing loss, tinnitus, trouble swallowing and voice change  Eyes: Negative  Negative for photophobia and pain  Respiratory: Negative  Negative for shortness of breath  Cardiovascular: Negative  Negative for palpitations  Gastrointestinal: Negative  Negative for nausea and vomiting  Endocrine: Negative  Negative for cold intolerance  Genitourinary: Negative  Negative for dysuria, frequency and urgency  Musculoskeletal: Positive for myalgias  Negative for neck pain  Skin: Negative  Negative for rash  Neurological: Positive for dizziness (Little bit), weakness and numbness  Negative for tremors, seizures, syncope, facial asymmetry, speech difficulty, light-headedness and headaches  Hematological: Negative  Does not bruise/bleed easily  Psychiatric/Behavioral: Positive for confusion  Negative for hallucinations and sleep disturbance

## 2021-08-11 NOTE — ASSESSMENT & PLAN NOTE
Patient with Parkinson's disease versus an atypical parkinsonian syndrome  His notes from [facility] stated diagnosis of [address]  He did have some initial response to Sinemet, however it is unclear how effective the medications still is  He continues to have difficulty with his gait and balance and it has become increasingly difficult for his family to care for him in the home  Per the wife she has looked into assistance before and the either do not qualify for any assistance  She does have some contacts that she can look into as far as private duty  I did let her know to contact us in the future if she would like help from our   Patient's wife never increase the rivastigmine previously, so we will try to further increase this to 13 3 mg and see if this may help to improve his cognition and gait  He will remain on his current dose of Sinemet, however if he does not improve we could consider a slight increase to 1 5tabs qid (then 2,1 5,2,1 5)  He will continue PT/OT  I advised to restart melatonin  He continues to complain of diplopia, in which I recommended an eye patch, however his wife states he is not compliant with this  He had seen ophtho in the past in which there is nothing further that can be done

## 2021-08-16 ENCOUNTER — TELEPHONE (OUTPATIENT)
Dept: NEUROLOGY | Facility: CLINIC | Age: 71
End: 2021-08-16

## 2021-08-16 NOTE — TELEPHONE ENCOUNTER
PT/OT order was signed by ARACELI  Order and most recent neuro office visit note was faxed to [facility] at [phone] this date  Successful fax notice received

## 2021-08-16 NOTE — TELEPHONE ENCOUNTER
The increase in the exelon patch was to help with cognition and gait-if any of those worsen I would recommend he go back down to the previous dosing he was taking (9 5 mg)  If he is truly having hallucinations and these are not improving with stopping the patches would contact PCP to rule out any underlying infection as the change to his medication was just made last week        As far as the dreams-are they more hallucinations or acting out of dreams? If hallucinations and the above has been ruled out could try nuplazid or seroquel but would need EKG prior to starting either of these meds  Nuplazid does have a black box warning for increased risk of death in elderly people with dementia  If it is more "Acting out" his dreams they could try melatonin 3 mg nightly, can increase up to 10 mg if ineffective  He does have an appt next month with Mukul Kohler I see

## 2021-08-16 NOTE — TELEPHONE ENCOUNTER
DESTIN received the following inquiry from Lester Quintero,  at [facility]:    "I am working through some outstanding things from last week, and I wondered if you might be able to help with this  Attached to this email is a request for home health services to be provided by [facility] [place], so that our [address] [place] [address] team can work through our contract with them and continue to provide therapy under the part a service  The request is addressed to [employer] because that is who we have in our system for the patient, but since she is a PA, the form will need to include the co-signature of a physician to meet Medicare criteria  In addition to the signed/co-signed form, we will also need a copy of the patients last visit note from your office as well, to meet the Medicare face-to-face encounter note requirement "    DESTIN responded to Crispin by saying:    "So this patient was just seen by Speedy Sidhu on 8/10  She is one of our new PA-Cs  Can she cross of [place], put hers, and sign? Also, are you sure it will need a co-sign? Not sure if you are aware of this: TodayLas traperasrt com ee"    Crispin responded with:    "I checked with the [facility] and after some back and forth, they ARE taking orders signed by PAs and CRNPs! !"    MSW will forward this form to PA-C to review/sign, if agreeable

## 2021-08-16 NOTE — TELEPHONE ENCOUNTER
Patient's wife Shona Skinner calling to report that the exelon patch made the patient worse  She stated that his hallucinations  worsened, she found him outside the one night  She stated that his vision seemed worse, and he's always falling asleep and is weak all the time  She stopped the exelon patches last week because of his decline  She reports that his sxs haven't changed that much since stopping the patch  He still thinks his dreams are reality  She stated she thinks that the patient needs to be seen by the Doctor asap  Informed her that the patient is already on the wait list for Dr David Auguste and Iza Espinosa  Sinemet 3 tabs TID  Please advise

## 2021-08-17 NOTE — TELEPHONE ENCOUNTER
Reviewed [place]'s response with patient's wife Lorenza Murry  She will have the patient go back on the 9 5 exelon patch and see how he does  She stated the patient just saw the PCP and r/u infection  She will hold off on starting any new meds until she discusses this at the f/u appt  Advised her to contact us with any questions/concerns  She verbalized understanding

## 2021-09-10 ENCOUNTER — NURSING HOME VISIT (OUTPATIENT)
Dept: GERIATRICS | Facility: OTHER | Age: 71
End: 2021-09-10
Payer: MEDICARE

## 2021-09-10 DIAGNOSIS — F02.80 DEMENTIA ASSOCIATED WITH OTHER UNDERLYING DISEASE WITHOUT BEHAVIORAL DISTURBANCE (HCC): ICD-10-CM

## 2021-09-10 DIAGNOSIS — R26.2 AMBULATORY DYSFUNCTION: Primary | ICD-10-CM

## 2021-09-10 DIAGNOSIS — R13.12 OROPHARYNGEAL DYSPHAGIA: ICD-10-CM

## 2021-09-10 DIAGNOSIS — I10 ESSENTIAL HYPERTENSION: ICD-10-CM

## 2021-09-10 DIAGNOSIS — E11.49 DIABETES MELLITUS TYPE 2 WITH NEUROLOGICAL MANIFESTATIONS (HCC): ICD-10-CM

## 2021-09-10 DIAGNOSIS — I48.20 ATRIAL FIBRILLATION, CHRONIC (HCC): ICD-10-CM

## 2021-09-10 DIAGNOSIS — G20 PARKINSON'S DISEASE (HCC): ICD-10-CM

## 2021-09-10 DIAGNOSIS — K59.09 OTHER CONSTIPATION: ICD-10-CM

## 2021-09-10 PROCEDURE — 99306 1ST NF CARE HIGH MDM 50: CPT | Performed by: FAMILY MEDICINE

## 2021-09-10 RX ORDER — LANOLIN ALCOHOL/MO/W.PET/CERES
800 CREAM (GRAM) TOPICAL DAILY
COMMUNITY

## 2021-09-10 RX ORDER — SENNA AND DOCUSATE SODIUM 50; 8.6 MG/1; MG/1
1 TABLET, FILM COATED ORAL
COMMUNITY

## 2021-09-10 RX ORDER — CHOLECALCIFEROL (VITAMIN D3) 25 MCG
1 TABLET ORAL DAILY
COMMUNITY

## 2021-09-10 RX ORDER — LATANOPROST 50 UG/ML
1 SOLUTION/ DROPS OPHTHALMIC
COMMUNITY

## 2021-09-10 RX ORDER — MONTELUKAST SODIUM 10 MG/1
10 TABLET ORAL
COMMUNITY

## 2021-09-10 NOTE — PROGRESS NOTES
Raina [address]  [address]  Facility[facility]      NAME: Ana Alonsoorter  AGE: 66 y o  SEX: male    DATE OF ENCOUNTER: 9/10/2021    Code status:  No CPR d/w pt and his wife and pt is DNR/DNI    Assessment and Plan     Ambulatory dysfunction  Multifactorial with recent hospitalization due to fall and UTI, needs NH care  Ordered Pt/OT to improve gait, transfer  ADLs, strength, endurance  Oropharyngeal dysphagia  With hx of aspiration pneumonia in the past  Ordered ST, per ST eval pt needs VBS, ordered VBS, on mechanical soft diet  Diabetes mellitus type 2 with neurological manifestations [facility])    Lab Results   Component Value Date    HGBA1C 6 7 (H) 08/19/2021   pt's hospital meds was Metformin ER BID  Changed to once a day with close monitoring  Parkinson's disease ([facility] )  On sinemet  Needs NH care  Ordered ST/OT/ PT    Dementia associated with other underlying disease without behavioral disturbance ([facility] )  On [place] patch  Ordered ST      HTN (hypertension)  On Metoprolol, ordered labs  Atrial fibrillation, chronic  HR controlled with Metoprolol  On Xarelto  Ordered, labs  Other constipation  Added senna S to pt's meds  Will monitor  All medications and routine orders were reviewed and updated as needed  Plan discussed with: Patient, his wife, and nursing staff    Chief Complaint     Constipation     History of Present Illness   Pt with Jon Moody and medical problem as this note who was admitted to [address] [place] [address] on 8/31 after a fall and was diagnosed and treated with antibiotic for UTI, now is at [facility] since 9/8 for STR  Pt reports constipation, no pain  He is forgetful, pt is on regular diet from hospital, ST eval shows some difficulty with processing liquid requiring further imagining study (VBS)  Per staff pt was up last night and going to other unit, other resident's room   Pt reports that he had a dream last night of going to other rooms and when I explained to him that it was not a dream and was real, he mentioned but he also saw his brother that he lives in other state! Per wife pt has been delusional with having UTI and he has been having vivid dreams, and forgetful  Pt's CT of head shows chronic microvascular ischemic changes       HISTORY:  Past Medical History:   Diagnosis Date    A-fib ([facility] )     Arthritis     Atrial flutter ([facility])     Balance problems     sometimes    BPH (benign prostatic hyperplasia)     CHF (congestive heart failure) ([facility])     Chronic pain disorder     shoulder    Coronary artery disease     cardiac myopathy    Diabetes mellitus (HCC)     type 2    Diabetic neuropathy ([facility])     bilat feet    Glaucoma     bilat    Gout     Hard of hearing     doesn t use his hearing aids    Heart murmur     aortic    Hiatal hernia     History of total right knee replacement     History of total shoulder replacement     left    Hyperlipidemia     Hypertension     Irregular heart beat     Knee pain, left     Knee pain, right     LBBB (left bundle branch block)     Obesity     Parkinson disease ([facility] )     Sleep apnea     Use of cane as ambulatory aid     Wears glasses      Family History   Problem Relation Age of Onset    Heart disease Father     Cancer Mother      Social History     Socioeconomic History    Marital status: /Civil Union     Spouse name: None    Number of children: None    Years of education: None    Highest education level: None   Occupational History    None   Tobacco Use    Smoking status: Never Smoker    Smokeless tobacco: Never Used   Vaping Use    Vaping Use: Never used   Substance and Sexual Activity    Alcohol use: Yes     Comment: socially    Drug use: Never    Sexual activity: Not Currently   Other Topics Concern    None   Social History Narrative    None     Social Determinants of Health     Financial Resource Strain:     Difficulty of Paying Living Expenses: Food Insecurity:     Worried About Running Out of Food in the Last Year:     [address] in the Last Year:    Transportation Needs:     Lack of Transportation (Medical):  Lack of Transportation (Non-Medical):    Physical Activity:     Days of Exercise per Week:     Minutes of Exercise per Session:    Stress:     Feeling of Stress :    Social Connections:     Frequency of Communication with Friends and Family:     Frequency of Social Gatherings with Friends and Family:     Attends [employer]oration[employer] Services:     Active Member of Clubs or Organizations:     Attends Club or Organization Meetings:     Marital Status:    Intimate Partner Violence:     Fear of Current or Ex-Partner:     Emotionally Abused:     Physically Abused:     Sexually Abused: Allergies:  No Known Allergies    Review of Systems     Review of Systems   Constitutional: Negative  HENT: Negative  Eyes: Negative  Respiratory: Negative  Cardiovascular: Negative  Gastrointestinal: Positive for constipation  Negative for abdominal distention, abdominal pain, anal bleeding, blood in stool, diarrhea, nausea, rectal pain and vomiting  Endocrine: Negative  Genitourinary: Negative  Musculoskeletal: Negative  Skin: Negative  Allergic/Immunologic: Negative  Neurological: Negative  Hematological: Negative  Psychiatric/Behavioral: Negative  All other systems reviewed and are negative  Medications and orders     All medications reviewed and updated in Nursing Home EMR  Objective     Vitals: wt:263 4Ibs          Physical Exam  Vitals and nursing note reviewed  Constitutional:       General: He is not in acute distress  Appearance: Normal appearance  He is well-developed  He is not ill-appearing, toxic-appearing or diaphoretic  HENT:      Head: Normocephalic and atraumatic        Right Ear: External ear normal       Left Ear: External ear normal       Nose: Nose normal  Mouth/Throat:      Comments: Missing teeth   Eyes:      General: No scleral icterus  Right eye: No discharge  Left eye: No discharge  Extraocular Movements: Extraocular movements intact  Conjunctiva/sclera: Conjunctivae normal       Pupils: Pupils are equal, round, and reactive to light  Cardiovascular:      Rate and Rhythm: Normal rate  Rhythm irregular  Heart sounds: Normal heart sounds  No murmur heard  No friction rub  No gallop  Comments: Left ACW pacer on  Pulmonary:      Effort: Pulmonary effort is normal  No respiratory distress  Breath sounds: Normal breath sounds  No stridor  No wheezing, rhonchi or rales  Chest:      Chest wall: No tenderness  Abdominal:      General: Bowel sounds are normal  There is no distension  Palpations: Abdomen is soft  There is no mass  Tenderness: There is no abdominal tenderness  There is no guarding or rebound  Hernia: No hernia is present  Genitourinary:     Comments: deferred  Musculoskeletal:         General: Deformity (of left foot ) present  No tenderness or signs of injury  Cervical back: Normal range of motion and neck supple  No rigidity or tenderness  Right lower leg: Edema (trace ) present  Left lower leg: Edema (trace ) present  Comments: Stooped posture    Lymphadenopathy:      Cervical: No cervical adenopathy  Skin:     General: Skin is warm and dry  Coloration: Skin is not jaundiced or pale  Findings: No bruising, erythema, lesion or rash  Comments: Didn't examine sacral area, prominent bony area on left knee, left knee and left shoulder old incision line  Neurological:      Mental Status: He is alert  Cranial Nerves: Cranial nerve deficit (slow speech, slow processing ) present  Comments: Forgetful, follows commands  Psychiatric:         Behavior: Behavior normal          Pertinent Laboratory/Diagnostic Studies:    The following labs/studies were reviewed please see chart or hospital paperwork for details  CT chest with and without IV contrast; CT abdomen and pelvis with IV contrast   9/1/2021     Reason for exam: Chest pain suspicious for aortic dissection  COMPARISON STUDY: CT chest, abdomen and pelvis 12/21/2020     Axial tomographic sections were obtained from the lung apices to the diaphragm   prior to intravenous contrast administration  Subsequently axial tomographic   sections were obtained from the lung apices to the pubic symphysis following   intravenous administration of 100 cc of Omnipaque 350  An aortic dissection   protocol is utilized  CT of the chest: The noncontrast enhanced portion of the study is free of   evidence of intramural hematoma  Coronary artery calcifications are noted  A   pacemaker and pacer wires are noted  A left shoulder prosthesis is noted  No   aortic dissection or pseudoaneurysm can be detected  No mediastinal hematoma can   be detected  The heart is not enlarged  No pericardial effusion is evident  No pleural effusion is evident  Well-defined consolidation consistent with   atelectasis is noted at the right lung base  No pneumothorax is evident  CT of the abdomen: Evaluation of the abdomen is limited by the arterial phase of   contrast injection with which the study is performed  No focal hepatic lesion   can be detected  No cholelithiasis or biliary dilatation is evident  The spleen   is not enlarged  No pancreatic mass, edema or peripancreatic collection can be   detected  No adrenal enlargement is evident  A 2 5 cm exophytic high attenuation   lesion is noted arising from the anterior aspect of the lower pole of the right   kidney  This right renal lesion is similar in appearance to the previous exam    The left kidney is free of nephrographic defects  No hydronephrosis is evident  No abdominal aortic aneurysm or retroperitoneal hematoma is evident  No free intraperitoneal air is evident   Malinda Saucedo thickening is noted in the region   of the gastric antrum which was also present on the previous exam  No bowel   obstruction is evident  The appendix could not be clearly visualized  No   inflammatory process can be detected in the region of the cecum  Osteopenia is noted  No destructive bony lesion can be detected  CT of the pelvis: No pelvic mass, adenopathy or free fluid can be detected  Remote fractures of the right pubic rami, right acetabulum and right iliac bone   are noted  Orthopedic screws are noted traversing the right iliac bone  Other Result Text    Samia Henderson MD - 09/01/2021   Formatting of this note might be different from the original    CT chest with and without IV contrast; CT abdomen and pelvis with IV contrast   9/1/2021     Reason for exam: Chest pain suspicious for aortic dissection  COMPARISON STUDY: CT chest, abdomen and pelvis 12/21/2020     Axial tomographic sections were obtained from the lung apices to the diaphragm   prior to intravenous contrast administration  Subsequently axial tomographic   sections were obtained from the lung apices to the pubic symphysis following   intravenous administration of 100 cc of Omnipaque 350  An aortic dissection   protocol is utilized  CT of the chest: The noncontrast enhanced portion of the study is free of   evidence of intramural hematoma  Coronary artery calcifications are noted  A   pacemaker and pacer wires are noted  A left shoulder prosthesis is noted  No   aortic dissection or pseudoaneurysm can be detected  No mediastinal hematoma can   be detected  The heart is not enlarged  No pericardial effusion is evident  No pleural effusion is evident  Well-defined consolidation consistent with   atelectasis is noted at the right lung base  No pneumothorax is evident  CT of the abdomen: Evaluation of the abdomen is limited by the arterial phase of   contrast injection with which the study is performed   No focal hepatic lesion   can be detected  No cholelithiasis or biliary dilatation is evident  The spleen   is not enlarged  No pancreatic mass, edema or peripancreatic collection can be   detected  No adrenal enlargement is evident  A 2 5 cm exophytic high attenuation   lesion is noted arising from the anterior aspect of the lower pole of the right   kidney  This right renal lesion is similar in appearance to the previous exam    The left kidney is free of nephrographic defects  No hydronephrosis is evident  No abdominal aortic aneurysm or retroperitoneal hematoma is evident  No free intraperitoneal air is evident  Wall thickening is noted in the region   of the gastric antrum which was also present on the previous exam  No bowel   obstruction is evident  The appendix could not be clearly visualized  No   inflammatory process can be detected in the region of the cecum  Osteopenia is noted  No destructive bony lesion can be detected  CT of the pelvis: No pelvic mass, adenopathy or free fluid can be detected  Remote fractures of the right pubic rami, right acetabulum and right iliac bone   are noted  Orthopedic screws are noted traversing the right iliac bone  IMPRESSION:   IMPRESSION: No aortic dissection or mediastinal hematoma  No bowel obstruction   or free air        Results from last 7 days   Lab Units 09/06/21  0306 09/02/21  0405 08/31/21  2350   WBC thou/cmm 6 3 5 9 5 8   RBC mill/cmm 4 78 4 47 4 45   HEMOGLOBIN g/dL 14 0 13 1 13 2   HEMATOCRIT % 42 5 39 4 39 7   MCV fL 89 88 89   MCH pg 29 2 29 3 29 6   RDW % 14 8 15 0 15 0   PLATELET COUNT thou/cmm 239 210 228     Results from last 7 days   Lab Units 09/06/21  0306 09/02/21  0405 08/31/21  2350   CREATININE mg/dL 0 51* 0 56 0 62   BUN mg/dL 11 8 10   SODIUM mmol/L 138 140 142   POTASSIUM mmol/L 3 6 3 7 3 7   CHLORIDE mmol/L 108 105 111*   CARBON DIOXIDE mmol/L 27 26 22*   GLUCOSE (GLUC) mg/dL 119* 94 103*   PROTEIN TOTAL g/dL -- -- 7 0   ALK PHOSPHATASE U/L -- -- 102   ALT U/L -- -- 10   AST U/L -- -- 15             Dora Dean MD  9/10/2021 1:17 PM

## 2021-09-10 NOTE — ASSESSMENT & PLAN NOTE
With hx of aspiration pneumonia in the past  Ordered ST, per ST eval pt needs VBS, ordered VBS, on mechanical soft diet

## 2021-09-10 NOTE — ASSESSMENT & PLAN NOTE
Multifactorial with recent hospitalization due to fall and UTI, needs NH care  Ordered Pt/OT to improve gait, transfer  ADLs, strength, endurance

## 2021-09-10 NOTE — ASSESSMENT & PLAN NOTE
Lab Results   Component Value Date    HGBA1C 6 7 (H) 08/19/2021   pt's hospital meds was Metformin ER BID  Changed to once a day with close monitoring

## 2021-09-12 ENCOUNTER — TELEPHONE (OUTPATIENT)
Dept: OTHER | Facility: OTHER | Age: 71
End: 2021-09-12

## 2021-09-12 NOTE — TELEPHONE ENCOUNTER
Yulisa Boyce called with Caterina Abad regarding Suicidal Thoughts No Plan      Paged the on call Provider

## 2021-09-13 ENCOUNTER — NURSING HOME VISIT (OUTPATIENT)
Dept: GERIATRICS | Facility: OTHER | Age: 71
End: 2021-09-13
Payer: MEDICARE

## 2021-09-13 DIAGNOSIS — F32.2 CURRENT SEVERE EPISODE OF MAJOR DEPRESSIVE DISORDER WITHOUT PSYCHOTIC FEATURES WITHOUT PRIOR EPISODE (HCC): Primary | ICD-10-CM

## 2021-09-13 DIAGNOSIS — F02.80 DEMENTIA ASSOCIATED WITH OTHER UNDERLYING DISEASE WITHOUT BEHAVIORAL DISTURBANCE (HCC): ICD-10-CM

## 2021-09-13 DIAGNOSIS — G23.2 MULTIPLE SYSTEM ATROPHY WITH PREDOMINANT PARKINSONISM (HCC): ICD-10-CM

## 2021-09-13 PROCEDURE — 99310 SBSQ NF CARE HIGH MDM 45: CPT | Performed by: NURSE PRACTITIONER

## 2021-09-14 PROBLEM — F32.2 CURRENT SEVERE EPISODE OF MAJOR DEPRESSIVE DISORDER WITHOUT PSYCHOTIC FEATURES WITHOUT PRIOR EPISODE (HCC): Status: ACTIVE | Noted: 2021-09-14

## 2021-09-14 PROBLEM — G23.2: Status: ACTIVE | Noted: 2021-09-14

## 2021-09-14 NOTE — PROGRESS NOTES
[address] [facility]  POS: 31: SNF/Short Term Rehab, [facility]    Name and Date of Birth:  Andrews Oliveros 66 y o  1950 MRN: [id number]    Date of Visit: September 13, 2021    Reason for visit (CC): Seen for Psychiatric Consult  at Nursing Facility/Assisted Living    No Known Allergies  HPI     Bertin Giron is a 66 y o  male with a psychiatric history of cognitive disorder and but no other previous psychiatric history who presents for psychiatric evaluation to establish care and due to depressive symptoms  Symptoms first started gradually several months ago and gradually worsened over the last several days  Stressors preceding visit included progressive decline due to neurological disorder  Assessment:    Bertin Giron presents alert and oriented to self and situation, but cannot tell me name of facility or date  He states "I was expecting you"  Able to relay events leading to psychiatric consult - making suicidal statements  He does admit to making suicidal statements and does states he has ongoing passive death wish  He denies any active plan or intent  He states he has thought of using nitrogen gas inhalation as method - based on his work experience at [employer] - he believes this would be quick and painless  However, he has no access or immediate intent  He cites his wife and  children as protective factors and states the family recently went through a overdose death, and he would not want to put his family through that  He does feel safe here at [facility] and would speak to staff if unsafe  He is tearful throughout interview  He is aware of his diagnosis of MSA and prognosis and expected decline  He "just wants to go back home", but knows he needs to progress in physical therapy for that to happen and he has not been progressing  He is reluctantly willing to trial an antidepressant and will Start Zoloft 25 mg    He will see [facility] for counseling  Will follow up in one week  Would recommend continuing 15 minutes checks for now due to significant depression and passive death wish  Plan:   All medications and routine orders were reviewed and updated as needed  Start Zoloft 25 mg  Referral for individual psychotherapy  Medication management every 1 week  Plan discussed with: Patient    Current Outpatient Medications on File Prior to Visit   Medication Sig Dispense Refill    acetaminophen (TYLENOL) 650 mg CR tablet Take 650 mg by mouth every 8 (eight) hours as needed for mild pain      allopurinol (ZYLOPRIM) 300 mg tablet Take 300 mg by mouth daily   aspirin 81 MG tablet Take 81 mg by mouth daily      atorvastatin (LIPITOR) 40 mg tablet Take 40 mg by mouth every evening      Bimatoprost (LUMIGAN OP) Apply 1 drop to eye daily at bedtime Both eyes       brimonidine (ALPHAGAN P) 0 15 % ophthalmic solution Administer 1 drop to the right eye daily 5 mL 0    Capsaicin-Menthol (Salonpas Pain Rel Gel-Ptch Hot) 0 025-1 25 % PTCH Apply topically as needed      carbidopa-levodopa (SINEMET)  mg per tablet Take 3 tablets by mouth 3 (three) times a day before meals 810 tablet 3    Cholecalciferol (Vitamin D3) 25 MCG TABS Take 1 tablet by mouth daily      cyanocobalamin 1000 MCG tablet Take 1 tablet (1,000 mcg total) by mouth daily 30 tablet 0    folic acid (FOLVITE) 303 mcg tablet Take 800 mcg by mouth daily      FOLIC ACID PO 0 4 mg daily      glucose blood test strip CHECK BLOOD SUGAR ONCE OR TWICE DAILY   E11 9 Z79 4      latanoprost (XALATAN) 0 005 % ophthalmic solution Administer 1 drop to both eyes daily at bedtime      magnesium oxide (MAG-OX) 400 mg Take 200 mg by mouth daily       METFORMIN HCL ER, MOD, PO Take 1,000 mg by mouth daily      metoprolol succinate (TOPROL-XL) 100 mg 24 hr tablet Take 1 tablet (100 mg total) by mouth daily 30 tablet 0    montelukast (SINGULAIR) 10 mg tablet Take 10 mg by mouth daily at bedtime      Multiple Vitamin (MULTIVITAMIN) tablet Take 1 tablet by mouth daily   polyethylene glycol (MIRALAX) 17 g packet Take 17 g by mouth daily (Patient taking differently: Take 17 g by mouth as needed ) 14 each 0    potassium chloride (K-DUR,KLOR-CON) 20 mEq tablet Take by mouth daily Dosage unknown to patient      rivaroxaban (XARELTO) 20 mg tablet Take 1 tablet (20 mg total) by mouth daily with dinner 30 tablet 0    rivastigmine (EXELON) 13 3 mg/24 hr TD 24 hr patch Place 1 patch on the skin daily 90 patch 2    senna-docusate sodium (SENOKOT-S) 8 6-50 mg per tablet Take 1 tablet by mouth daily at bedtime      tamsulosin (FLOMAX) 0 4 mg TAKE 1 CAPSULE TWICE A  capsule 1     No current facility-administered medications on file prior to visit        Diagnoses and all orders for this visit:    Current severe episode of major depressive disorder without psychotic features without prior episode ([facility] )    Dementia associated with other underlying disease without behavioral disturbance ([facility] )    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[facility] )         Psychiatric Review Of Systems:    Sleep changes: fluctuating sleep pattern  Appetite changes: fluctuating appetite  Weight changes: no weight change  Energy/anergy: decreased energy  Interest/pleasure/anhedonia: yes  Somatic symptoms: yes  Anxiety/panic: no  Shonda: no  Guilty/hopeless: yes  Self injurious behavior/risky behavior: Patient denies current risk or intent to self harm  Suicidal ideation: yes, passive suicidal ideation without plan or intent  Homicidal ideation: Patient denies homicidal ideations  Auditory hallucinations: no  Visual hallucinations: no  Other hallucinations: no  Delusional thinking: no  Eating disorder history: no  Obsessive/compulsive symptoms: no    Review Of Systems:    General decreased functioning   Personality no change in personality   Constitutional feeling tired   ENT as noted in HPI Cardiovascular negative   Respiratory negative   Gastrointestinal negative   Genitourinary negative   Musculoskeletal negative   Integumentary negative   Neurological decreased memory and dyskinetic movements   Endocrine negative   Other Symptoms none, all other systems are negative       OBJECTIVE:    Vital signs in last 24 hours: Vital signs and nursing notes reviewed in facility chart      Mental Status Evaluation:      Appearance Adequate hygiene and grooming   Behavior calm and cooperative   Mood depressed   Speech Increased latency of response and Soft   Affect mood-congruent   Thought Processes Goal directed and coherent   Thought Content Does not verbalize delusional material   Associations Tightly connected   Perceptual Disturbances Denies hallucinations and does not appear to be responding to internal stimuli   Risk Potential Suicidal/Homicidal Ideation - No evidence of suicidal or homicidal ideation and patient does not verbalize suicidal or homicidal ideation and Hopeless with death wishes  Risk of Violence - No evidence of risk for violence found on assessment  Risk of Self Mutilation - No evidence of risk for self mutilation found on assessment   Orientation oriented to person and situation   Memory recent memory impaired   Consciousness alert and awake   Attention/Concentration decreased attention span  decreased concentration   Intellect appears to be of average intelligence   Insight limited   Judgement limited   Muscle Strength and Gait decreased muscle tone, decreased muscle strength, uses wheelchair   Motor Activity no abnormal movements   Language no difficulty naming common objects, no difficulty repeating a phrase, no difficulty writing a sentence   Fund of Knowledge adequate knowledge of current events  adequate fund of knowledge regarding past history  adequate fund of knowledge regarding vocabulary                Laboratory Results: I have personally reviewed all pertinent laboratory/tests results  Historical Information     History Review:     The following portions of the patient's history were reviewed and updated as appropriate: allergies, current medications, past family history, past medical history, past social history, past surgical history and problem list     Past Psychiatric History:     Past Inpatient Psychiatric Treatment:   No history of past inpatient psychiatric admissions  Past Outpatient Psychiatric Treatment:    No history of past outpatient psychiatric treatment  Past Suicide Attempts: No evidence of past suicide attempts  Past Violent Behavior: No evidence of past violent behavior and Patient denies history of violent behavior  Past Psychiatric Medication Trials: none    Traumatic History:     Abuse: no history of physical or sexual abuse  Other Traumatic Events: none     Family Psychiatric History:     Family History   Problem Relation Age of Onset    Heart disease Father     Cancer Mother      Substance Use History:    Social History     Substance and Sexual Activity   Alcohol Use Yes    Comment: socially     Social History     Substance and Sexual Activity   Drug Use Never     Social History:    Social History     Socioeconomic History    Marital status: /Civil Union     Spouse name: Not on file    Number of children: Not on file    Years of education: Not on file    Highest education level: Not on file   Occupational History    Not on file   Tobacco Use    Smoking status: Never Smoker    Smokeless tobacco: Never Used   Vaping Use    Vaping Use: Never used   Substance and Sexual Activity    Alcohol use: Yes     Comment: socially    Drug use: Never    Sexual activity: Not Currently   Other Topics Concern    Not on file   Social History Narrative    Not on file     Social Determinants of Health     Financial Resource Strain:     Difficulty of Paying Living Expenses:    Food Insecurity:     Worried About Running Out of Food in the Last Year:     Ran Out of Food in the Last Year:    Transportation Needs:     Lack of Transportation (Medical):      Lack of Transportation (Non-Medical):    Physical Activity:     Days of Exercise per Week:     Minutes of Exercise per Session:    Stress:     Feeling of Stress :    Social Connections:     Frequency of Communication with Friends and Family:     Frequency of Social Gatherings with Friends and Family:     Attends [employer] Services:     Active Member of Clubs or Organizations:     Attends Club or Organization Meetings:     Marital Status:    Intimate Partner Violence:     Fear of Current or Ex-Partner:     Emotionally Abused:     Physically Abused:     Sexually Abused:      Past Medical History:    Past Medical History:   Diagnosis Date    A-fib ([place] [address] )     Arthritis     Atrial flutter ([place] [address] )     Balance problems     sometimes    BPH (benign prostatic hyperplasia)     CHF (congestive heart failure) ([place] [address] )     Chronic pain disorder     shoulder    Coronary artery disease     cardiac myopathy    Diabetes mellitus ([place] [address] )     type 2    Diabetic neuropathy ([place] [address] )     bilat feet    Glaucoma     bilat    Gout     Hard of hearing     doesn t use his hearing aids    Heart murmur     aortic    Hiatal hernia     History of total right knee replacement     History of total shoulder replacement     left    Hyperlipidemia     Hypertension     Irregular heart beat     Knee pain, left     Knee pain, right     LBBB (left bundle branch block)     Obesity     Parkinson disease ([place] [address] )     Sleep apnea     Use of cane as ambulatory aid     Wears glasses      Past Medical History Pertinent Negatives:   Diagnosis Date Noted    CPAP (continuous positive airway pressure) dependence 09/16/2020    History of transfusion 02/12/2019     Past Surgical History:   Procedure Laterality Date    CARDIAC CATHETERIZATION      x2    CARDIAC CATHETERIZATION     [address] CARDIAC ELECTROPHYSIOLOGY STUDY AND ABLATION      CARDIOVERSION      COLONOSCOPY      COLONOSCOPY N/A 7/15/2016    Procedure: COLONOSCOPY;  Surgeon: Kimberly Oliver MD;  Location: [facility]; Service:     EYE SURGERY      laser eye surgery    FACIAL/NECK BIOPSY Left 9/17/2020    Procedure: EXCISION LESION OF FOREHEAD W/ FROZEN SECTION;  Surgeon: Marva Almanza DO;  Location: [facility];  Service: Plastics    FOOT SURGERY Left     HIATAL HERNIA REPAIR      Darien Yao / Mariana Moyer / Lalla Seip PACEMAKER      IR LUMBAR PUNCTURE  2/17/2020    JOINT REPLACEMENT      left shoulder/right knee    NJ REVISE KNEE JOINT REPLACE,1 PART Right 4/21/2017    Procedure: KNEE REVISION PARTIAL VERSUS COMPLETE REVISION;  Surgeon: Matthew Kruger MD;  Location: [facility];  Service: Orthopedics    WISDOM TOOTH EXTRACTION           Medications Risks/Benefits:      Risks, Benefits And Possible Side Effects Of Medications:    Discussed risks and benefits of treatment with patient including risk of suicidality, serotonin syndrome and SIADH related to treatment with antidepressants;  Risk of induction of manic symptoms in certain patient populations     Controlled Medication Discussion:     Not applicable - controlled prescriptions are not prescribed by this practice        BLESSING Zhang

## 2021-10-06 ENCOUNTER — TELEPHONE (OUTPATIENT)
Dept: OTHER | Facility: OTHER | Age: 71
End: 2021-10-06

## 2021-10-28 ENCOUNTER — TELEPHONE (OUTPATIENT)
Dept: NEUROLOGY | Facility: CLINIC | Age: 71
End: 2021-10-28

## 2021-11-11 ENCOUNTER — TELEPHONE (OUTPATIENT)
Dept: OTHER | Facility: OTHER | Age: 71
End: 2021-11-11

## 2021-11-11 ENCOUNTER — TELEPHONE (OUTPATIENT)
Dept: NEUROLOGY | Facility: CLINIC | Age: 71
End: 2021-11-11

## 2021-12-09 ENCOUNTER — OFFICE VISIT (OUTPATIENT)
Dept: NEUROLOGY | Facility: CLINIC | Age: 71
End: 2021-12-09
Payer: MEDICARE

## 2021-12-09 VITALS — HEART RATE: 70 BPM | DIASTOLIC BLOOD PRESSURE: 64 MMHG | SYSTOLIC BLOOD PRESSURE: 122 MMHG

## 2021-12-09 DIAGNOSIS — R13.12 OROPHARYNGEAL DYSPHAGIA: ICD-10-CM

## 2021-12-09 DIAGNOSIS — G20 ATYPICAL PARKINSONISM (HCC): Primary | ICD-10-CM

## 2021-12-09 PROCEDURE — 99215 OFFICE O/P EST HI 40 MIN: CPT | Performed by: PSYCHIATRY & NEUROLOGY

## 2021-12-16 ENCOUNTER — TELEPHONE (OUTPATIENT)
Dept: PALLIATIVE MEDICINE | Facility: CLINIC | Age: 71
End: 2021-12-16

## 2022-01-03 ENCOUNTER — TELEPHONE (OUTPATIENT)
Dept: PALLIATIVE MEDICINE | Facility: CLINIC | Age: 72
End: 2022-01-03

## 2022-01-03 NOTE — TELEPHONE ENCOUNTER
Wife of patient called and requested cancellation of apt with Palliative Care for 1/17/22 to be cancelled  Patient will be admitted to nursing Home and then transitioning to hospice per wife

## 2022-04-19 ENCOUNTER — TELEPHONE (OUTPATIENT)
Dept: NEUROLOGY | Facility: CLINIC | Age: 72
End: 2022-04-19

## 2022-04-19 NOTE — TELEPHONE ENCOUNTER
Pt's wife Latha López called and states that ELAINE   suggested pt to get botox shot for drooling  Pt is not taking any med for drooling  Has not tried any meds in the past   Asking your thought about botox? Or do you want to prescribe atropine drops?          [phone] ok to leave detailed message

## 2022-04-20 NOTE — TELEPHONE ENCOUNTER
If he wishes to proceed with Botox injections for drooling ,  I can perform  If patient agreeable forward to Botox team that 100units would be needed for sialorrhea    We can see if covered

## 2022-05-27 ENCOUNTER — TELEPHONE (OUTPATIENT)
Dept: NEUROLOGY | Facility: CLINIC | Age: 72
End: 2022-05-27

## 2022-05-27 NOTE — TELEPHONE ENCOUNTER
Pt's pharmacy calling in to clarify dose on pt's Exelon patch.  You last ordered in 08/2021  13.3 mg/24 hours    Pharmacy recv'd an order for the patch in March 2022 for 9.5 mg from a Dr. Kasia Marr.    I don't see this order in patient's chart. But pharmacy says they have it.  They are questioning the lowered dose as patient was also upset about the lower dose.    Should pt stay on 13.3 mg?      Please advise.  [facility] CB# [phone]

## 2022-05-27 NOTE — TELEPHONE ENCOUNTER
I am not sure who that provider is that sent that medication-was he at some type of rehab facility at that time? If there was no reason to reduce the medication such as dizziness, GI side effects, etc, he can go back to the 13.3 mg dose. If they need updated prescription sent please let me know.

## 2022-06-01 NOTE — TELEPHONE ENCOUNTER
Spoke with patient's wife. He is not having any s/e at the moment. And is requesting we send in the higher dose patch 13.3 mg.  Stated he was doing better overall on the 13.3 mg dosage.    If you can send to [facility] pharmacy

## 2022-06-02 DIAGNOSIS — G20 PARKINSON'S DISEASE (HCC): ICD-10-CM

## 2022-06-02 RX ORDER — RIVASTIGMINE 13.3 MG/24H
1 PATCH, EXTENDED RELEASE TRANSDERMAL DAILY
Qty: 90 PATCH | Refills: 2 | Status: SHIPPED | OUTPATIENT
Start: 2022-06-02 | End: 2022-06-22

## 2022-06-21 NOTE — TELEPHONE ENCOUNTER
Patient does not require prior authorization for botox injections as he has medicare A & B as his coverage  Please assist with scheduling botox injections

## 2022-06-22 ENCOUNTER — NURSING HOME VISIT (OUTPATIENT)
Dept: GERIATRICS | Facility: OTHER | Age: 72
End: 2022-06-22
Payer: MEDICARE

## 2022-06-22 DIAGNOSIS — I10 PRIMARY HYPERTENSION: ICD-10-CM

## 2022-06-22 DIAGNOSIS — F02.80 DEMENTIA ASSOCIATED WITH OTHER UNDERLYING DISEASE WITHOUT BEHAVIORAL DISTURBANCE (HCC): ICD-10-CM

## 2022-06-22 DIAGNOSIS — R13.12 OROPHARYNGEAL DYSPHAGIA: ICD-10-CM

## 2022-06-22 DIAGNOSIS — I48.20 ATRIAL FIBRILLATION, CHRONIC (HCC): ICD-10-CM

## 2022-06-22 DIAGNOSIS — I50.32 CHRONIC DIASTOLIC CHF (CONGESTIVE HEART FAILURE) (HCC): ICD-10-CM

## 2022-06-22 DIAGNOSIS — G23.2 MULTIPLE SYSTEM ATROPHY WITH PREDOMINANT PARKINSONISM (HCC): Primary | ICD-10-CM

## 2022-06-22 DIAGNOSIS — E11.49 DIABETES MELLITUS TYPE 2 WITH NEUROLOGICAL MANIFESTATIONS (HCC): ICD-10-CM

## 2022-06-22 PROBLEM — R41.0 CONFUSION AND DISORIENTATION: Status: ACTIVE | Noted: 2022-06-15

## 2022-06-22 PROBLEM — Z95.0 HX OF CARDIAC PACEMAKER: Status: ACTIVE | Noted: 2022-06-22

## 2022-06-22 PROBLEM — N40.0 BPH (BENIGN PROSTATIC HYPERPLASIA): Status: ACTIVE | Noted: 2019-02-07

## 2022-06-22 PROBLEM — E78.5 HYPERLIPIDEMIA: Status: ACTIVE | Noted: 2019-02-07

## 2022-06-22 PROBLEM — G93.40 ACUTE ENCEPHALOPATHY: Status: ACTIVE | Noted: 2021-09-01

## 2022-06-22 PROCEDURE — 99306 1ST NF CARE HIGH MDM 50: CPT | Performed by: FAMILY MEDICINE

## 2022-06-22 RX ORDER — RIVASTIGMINE 9.5 MG/24H
1 PATCH, EXTENDED RELEASE TRANSDERMAL DAILY
COMMUNITY

## 2022-06-22 RX ORDER — FUROSEMIDE 20 MG/1
10 TABLET ORAL EVERY MORNING
COMMUNITY

## 2022-06-23 NOTE — PROGRESS NOTES
[address]  [address] [place] [address]  Facility: -[place]    NAME: Claire Alonsoorter  AGE: 74 y o  SEX: male    DATE OF ENCOUNTER: 6/22/2022 Late Entry    Code status:  No CPR D/W pt's wife and she confirms pt to be DNR/DNI    Assessment and Plan   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[facility] )  With recent hospitalization, needs NH care, ordered Pt/OT to  Improve gait, transfer, ADLs, strength, and endurance  Dementia associated with other underlying disease without behavioral disturbance ([facility] )  With recent increased forgetfulness and disorientation, needs NH care  Ordered SLP  Chronic diastolic CHF (congestive heart failure) ([facility])  Ordered labs, and wt check daily  On Lasix  Will continue with monitoring  Oropharyngeal dysphagia  on Nectar consistency, ordered SLP  Diabetes mellitus type 2 with neurological manifestations ([facility] )    Lab Results   Component Value Date    HGBA1C 6 4 (H) 04/12/2022   on Metformin, ordered BS check  Will monitor closely  HTN (hypertension)  On Metoprolol  Will monitor closely  Ordered labs  Atrial fibrillation, chronic  HR controlled with Metoprolol  On Xarelto  Will continue with monitoring  All medications and routine orders were reviewed and updated as needed  Plan discussed with: Patient, and pt's wife and nursing staff     Chief Complaint     Pt has no specific complaint     History of Present Illness   Pt with PMSSFH and medical problem as this note who was admitted to [facility] on 6/15 with worsening disorientation and forgetfulness as well as ambulatory dysfunction  CT head negative for stroke  Neurology was consulted and presentation thought to b secondary to progressive functional decline in the setting of Multiple system atrophy  Pt had VBS study at the hospital showed transient laryngeal aspiration with suggestion fo mild silent aspiration   SLP recommended dysphagia diet with mild nectar thick liquids  He is now at [facility] today to have STR  Pt has no current complaint       HISTORY:  Past Medical History:   Diagnosis Date    A-fib ([address] )     Arthritis     Atrial flutter (HCC)     Balance problems     sometimes    BPH (benign prostatic hyperplasia)     [facility]) [facility]     Chronic pain disorder     shoulder    Coronary artery disease     cardiac myopathy    Diabetes mellitus (HCC)     type 2    Diabetic neuropathy (HCC)     bilat feet    Glaucoma     bilat    Gout     Hard of hearing     doesn t use his hearing aids    Heart murmur     aortic    Hiatal hernia     History of total right knee replacement     History of total shoulder replacement     left    Hyperlipidemia     Hypertension     Irregular heart beat     Knee pain, left     Knee pain, right     LBBB (left bundle branch block)     Obesity     Parkinson disease (HCC)     Sleep apnea     Use of cane as ambulatory aid     Wears glasses      Family History   Problem Relation Age of Onset    Heart disease Father     Cancer Mother      Social History     Socioeconomic History    Marital status: /Civil Union     Spouse name: None    Number of children: None    Years of education: None    Highest education level: None   Occupational History    None   Tobacco Use    Smoking status: Never Smoker    Smokeless tobacco: Never Used   Vaping Use    Vaping Use: Never used   Substance and Sexual Activity    Alcohol use: Yes     Comment: socially    Drug use: Never    Sexual activity: Not Currently   Other Topics Concern    None   Social History Narrative    None     Social Determinants of Health     Financial Resource Strain: Not on file   Food Insecurity: Not on file   Transportation Needs: Not on file   Physical Activity: Not on file   Stress: Not on file   Social Connections: Not on file   Intimate Partner Violence: Not on file   Housing Stability: Not on file Allergies: Allergies   Allergen Reactions    Peanut Oil - Food Allergy Cough       Review of Systems     Review of Systems   Constitutional: Negative  HENT: Negative  Eyes: Negative  Respiratory: Negative  Cardiovascular: Negative  Gastrointestinal: Negative  Endocrine: Negative  Genitourinary: Negative  Musculoskeletal: Negative  Skin: Negative  Allergic/Immunologic: Negative  Neurological: Negative  Hematological: Negative  Psychiatric/Behavioral: Negative  All other systems reviewed and are negative  Medications and orders     All medications reviewed and updated in Nursing Home EMR  Objective     Vitals: Reviewed     Physical Exam  Vitals reviewed  Constitutional:       General: He is not in acute distress  Appearance: He is well-developed  He is not ill-appearing, toxic-appearing or diaphoretic  HENT:      Head: Normocephalic and atraumatic  Right Ear: External ear normal       Left Ear: External ear normal       Nose: Nose normal  No congestion or rhinorrhea  Mouth/Throat:      Mouth: Mucous membranes are moist    Eyes:      General: No scleral icterus  Right eye: No discharge  Left eye: No discharge  Extraocular Movements: Extraocular movements intact  Conjunctiva/sclera: Conjunctivae normal       Pupils: Pupils are equal, round, and reactive to light  Cardiovascular:      Rate and Rhythm: Normal rate  Rhythm irregular  Heart sounds: Normal heart sounds  No murmur heard  No friction rub  No gallop  Pulmonary:      Effort: Pulmonary effort is normal  No respiratory distress  Breath sounds: Normal breath sounds  No stridor  No wheezing, rhonchi or rales  Chest:      Chest wall: No tenderness  Abdominal:      General: Bowel sounds are normal  There is no distension  Palpations: Abdomen is soft  There is no mass  Tenderness: There is no abdominal tenderness   There is no guarding or rebound  Hernia: No hernia is present  Genitourinary:     Comments: deferred  Musculoskeletal:         General: No swelling, tenderness, deformity or signs of injury  Cervical back: Normal range of motion and neck supple  No rigidity or tenderness  Right lower leg: Edema present  Left lower leg: Edema present  Comments: In a chair sitting, limited ROM   Lymphadenopathy:      Cervical: No cervical adenopathy  Skin:     General: Skin is warm and dry  Coloration: Skin is not jaundiced or pale  Findings: No bruising, erythema, lesion or rash  Comments: Didn't examine sacral area, scabbing area on B/S feet  Left knee open area with small white spot  Neurological:      Mental Status: He is alert and oriented to person, place, and time  Cranial Nerves: Cranial nerve deficit (slow processing ) present  Psychiatric:         Behavior: Behavior normal          Pertinent Laboratory/Diagnostic Studies: The following labs/studies were reviewed please see chart or hospital paperwork for details     Ref Range & Units Value   Hemoglobin 12 5 - 17 0 g/dL 12 4 Low     Hematocrit 37 0 - 48 0 % 37 9    WBC 4 0 - 10 5 thou/cmm 7 3    RBC 4 00 - 5 40 mill/cmm 4 06    Platelet Count 672 - 350 thou/cmm 209    MPV 7 5 - 11 3 fL 7 8    MCV 80 - 100 fL 94    MCH 27 0 - 36 0 pg 30 6    MCHC 32 0 - 37 0 g/dL 32 7    RDW 12 0 - 16 0 % 14 2    Differential Type  AUTO    Absolute Neutrophils 1 8 - 7 8 thou/cmm 4 8    Absolute Lymphocytes 1 0 - 3 0 thou/cmm 1 6    Absolute Monocytes 0 3 - 1 0 thou/cmm 0 8    Absolute Eosinophils 0 0 - 0 5 thou/cmm 0 1    Absolute Basophils 0 0 - 0 1 thou/cmm 0 0    Neutrophils % 67    Lymphocytes % 22    Monocytes % 10    Eosinophils % 1    Basophils % 0      Ref Range & Units 5/5/22 1126    Glucose 65 - 99 mg/dL 130 High     BUN 7 - 28 mg/dL 12    Creatinine 0 53 - 1 30 mg/dL 0 52 Low     Sodium 135 - 145 mmol/L 138    Potassium 3 5 - 5 2 mmol/L 4 1    Chloride 100 - 109 mmol/L 108    Carbon Dioxide 23 - 31 mmol/L 27    Calcium 8 5 - 10 1 mg/dL 9 1    Alkaline Phosphatase 35 - 120 U/L 86    Albumin 3 5 - 4 8 g/dL 3 1 Low     Bilirubin, Total 0 2 - 1 0 mg/dL 1 0    Comment: Use of this assay is not recommended for patients undergoing treatment with eltrombopag due to the potential for falsely elevated results     Protein, Total 6 3 - 8 3 g/dL 6 5    AST <41 U/L 15    ALT <56 U/L <6    Anion Gap 3 - 11 3    eGFRcr >59 106    eGFRcr Comment  Interpretive information: calculated GFR    Comment:                                                     Zee Molina MD

## 2022-06-23 NOTE — ASSESSMENT & PLAN NOTE
Lab Results   Component Value Date    HGBA1C 6 4 (H) 04/12/2022   on Metformin, ordered BS check  Will monitor closely

## 2022-06-24 ENCOUNTER — NURSING HOME VISIT (OUTPATIENT)
Dept: GERIATRICS | Facility: OTHER | Age: 72
End: 2022-06-24
Payer: MEDICARE

## 2022-06-24 DIAGNOSIS — S81.802D WOUND OF LEFT LOWER EXTREMITY, SUBSEQUENT ENCOUNTER: Primary | ICD-10-CM

## 2022-06-24 PROBLEM — S81.802A WOUND OF LEFT LEG: Status: ACTIVE | Noted: 2022-06-24

## 2022-06-24 PROCEDURE — 99308 SBSQ NF CARE LOW MDM 20: CPT | Performed by: FAMILY MEDICINE

## 2022-06-24 NOTE — PROGRESS NOTES
[facility]  [address]  [phone]  Facility: [place] [address]          NAME: Moshe Rubio  AGE: 74 y o  SEX: male    DATE OF ENCOUNTER: 6/24/2022    Chief Complaint     Pt has no specific complaint for this visit     History of Present Illness     HPI    The following portions of the patient's history were reviewed and updated as appropriate (from facility chart and hospital records): allergies, current medications, past family history, past medical history, past social history, past surgical history and problem list  Pt was seen and examined for f/u on Left knee wound which per staff report is getting better  Pt continues with therapy  Pt had labs today which was reviewed       Review of Systems     Review of Systems   Musculoskeletal:        No pain on left knee        Active Problem List     Patient Active Problem List   Diagnosis    Failed total right knee replacement ([address] )    Fall (on) (from) unspecified stairs and steps, sequela    Abnormal gait    Aspiration pneumonitis ([facility])    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[facility] )    HTN (hypertension)    Diabetes mellitus type 2 with neurological manifestations ([address] )    Atrial fibrillation, chronic    Hypertrophic cardiomyopathy ([facility])    Chronic diastolic CHF (congestive heart failure) ([facility])    Dyspnea on exertion    Diplopia    Obstructive sleep apnea syndrome    Visual changes    Diabetes mellitus, type 2 ([address] )    Dementia associated with other underlying disease without behavioral disturbance ([address] )    Ambulatory dysfunction    Oropharyngeal dysphagia    Other constipation    Current severe episode of major depressive disorder without psychotic features without prior episode ([address] )    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[address] )    Morbid obesity with body mass index (BMI) of 40 0 to 49 9 ([facility])    Hx of cardiac pacemaker    Acute encephalopathy    BPH (benign prostatic hyperplasia)    CHF (congestive heart failure) (HCC)    Confusion and disorientation    Hyperlipidemia    Vitamin D deficiency    Wound of left leg       Objective     Vitals: Reviewed   Physical Exam  Skin:     Findings: Erythema (closed skin change with patchy scab area over ) present  Comments: Raised bony area on Left knee below skin changes  Pertinent Laboratory/Diagnostic Studies:  No CBC, BMP for today reviewed     Current Medications   Medication list in facility chart was reviewed and necessary changes made  Assessment and Plan   Wound of left leg  Of left knee, will DC antibiotic order, Betadine ot scab closed area  Will continue with monitoring       Joann Goldman MD  1/54/78294:20 PM

## 2022-06-25 NOTE — ASSESSMENT & PLAN NOTE
Of left knee, will DC antibiotic order, Betadine ot scab closed area  Will continue with monitoring

## 2022-07-06 ENCOUNTER — NURSING HOME VISIT (OUTPATIENT)
Dept: GERIATRICS | Facility: OTHER | Age: 72
End: 2022-07-06
Payer: MEDICARE

## 2022-07-06 DIAGNOSIS — E11.49 DIABETES MELLITUS TYPE 2 WITH NEUROLOGICAL MANIFESTATIONS (HCC): ICD-10-CM

## 2022-07-06 DIAGNOSIS — I50.32 CHRONIC DIASTOLIC CHF (CONGESTIVE HEART FAILURE) (HCC): ICD-10-CM

## 2022-07-06 DIAGNOSIS — I48.20 ATRIAL FIBRILLATION, CHRONIC (HCC): ICD-10-CM

## 2022-07-06 DIAGNOSIS — S81.802D WOUND OF LEFT LOWER EXTREMITY, SUBSEQUENT ENCOUNTER: ICD-10-CM

## 2022-07-06 DIAGNOSIS — R26.2 AMBULATORY DYSFUNCTION: Primary | ICD-10-CM

## 2022-07-06 DIAGNOSIS — I10 PRIMARY HYPERTENSION: ICD-10-CM

## 2022-07-06 DIAGNOSIS — R13.12 OROPHARYNGEAL DYSPHAGIA: ICD-10-CM

## 2022-07-06 PROCEDURE — 99316 NF DSCHRG MGMT 30 MIN+: CPT | Performed by: FAMILY MEDICINE

## 2022-07-06 NOTE — PROGRESS NOTES
[facility]  [address]  [phone]  Facility: [place] [address]  Discharged Note             NAME: Isabel Rubio  AGE: 74 y o  SEX: male    DATE OF ENCOUNTER: 7/6/2022    Chief Complaint     No new complaint     History of Present Illness     HPI    The following portions of the patient's history were reviewed and updated as appropriate (from facility chart and hospital records): allergies, current medications, past family history, past medical history, past social history, past surgical history and problem list  Pt was seen and examined for f/u on Ambulatory dysfunction,DM,  HTN, Afibd, CHF, dementia, dysphagia, and parkinson's disease  Pt has been improving with therapy  Walks using his Walker  Tolerating Nectar thick liquid  BS has been stable  No sign or symptoms of CHF exacerbation HR and BS stable  Pt will be discharged on 7/8  He has no specific complaint  BS stable  Review of Systems     Review of Systems   Constitutional: Negative  HENT: Negative  Eyes: Negative  Respiratory: Negative  Cardiovascular: Negative  Gastrointestinal: Negative  Endocrine: Negative  Genitourinary: Negative  Musculoskeletal: Positive for arthralgias (shoulders pain (chronic))  Skin: Negative  Allergic/Immunologic: Negative  Neurological: Negative  Hematological: Negative  Psychiatric/Behavioral: Negative  All other systems reviewed and are negative        Active Problem List     Patient Active Problem List   Diagnosis    Failed total right knee replacement ([address] )    Fall (on) (from) unspecified stairs and steps, sequela    Abnormal gait    Aspiration pneumonitis (HCC)    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[address] )    HTN (hypertension)    Diabetes mellitus type 2 with neurological manifestations ([address] )    Atrial fibrillation, chronic    Hypertrophic cardiomyopathy (HCC)    Chronic diastolic CHF (congestive heart failure) (HCC)    Dyspnea on exertion    Diplopia    Obstructive sleep apnea syndrome    Visual changes    Diabetes mellitus, type 2 ([facility] )    Dementia associated with other underlying disease without behavioral disturbance ([facility]    Ambulatory dysfunction    Oropharyngeal dysphagia    Other constipation    Current severe episode of major depressive disorder without psychotic features without prior episode ([facility] )    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[facility] )    Morbid obesity with body mass index (BMI) of 40 0 to 49 9 ([facility])    Hx of cardiac pacemaker    Acute encephalopathy    BPH (benign prostatic hyperplasia)    CHF (congestive heart failure) ([facility])    Confusion and disorientation    Hyperlipidemia    Vitamin D deficiency    Wound of left leg       Objective     Vitals: Reviewed     Physical Exam  Vitals reviewed  Constitutional:       General: He is not in acute distress  Appearance: He is well-developed  He is not ill-appearing, toxic-appearing or diaphoretic  HENT:      Head: Normocephalic and atraumatic  Right Ear: External ear normal       Left Ear: External ear normal       Nose: Nose normal  No congestion or rhinorrhea  Mouth/Throat:      Mouth: Mucous membranes are moist    Eyes:      General: No scleral icterus  Right eye: No discharge  Left eye: No discharge  Extraocular Movements: Extraocular movements intact  Conjunctiva/sclera: Conjunctivae normal       Pupils: Pupils are equal, round, and reactive to light  Cardiovascular:      Rate and Rhythm: Normal rate  Rhythm irregular  Heart sounds: Normal heart sounds  No murmur heard  No friction rub  No gallop  Pulmonary:      Effort: Pulmonary effort is normal  No respiratory distress  Breath sounds: Normal breath sounds  No stridor  No wheezing, rhonchi or rales  Chest:      Chest wall: No tenderness  Abdominal:      General: Bowel sounds are normal  There is no distension        Palpations: Abdomen is soft  There is no mass  Tenderness: There is no abdominal tenderness  There is no guarding or rebound  Hernia: No hernia is present  Comments: Protuberant    Genitourinary:     Comments: deferred  Musculoskeletal:         General: No swelling, tenderness, deformity or signs of injury  Normal range of motion  Cervical back: Normal range of motion and neck supple  No rigidity or tenderness  Right lower leg: No edema  Left lower leg: No edema  Comments: Sitting in [place], limited ROM of UEs  Lymphadenopathy:      Cervical: No cervical adenopathy  Skin:     General: Skin is warm and dry  Coloration: Skin is not jaundiced or pale  Findings: Erythema (left knee raised area (bone prominance) scab over) present  No bruising, lesion or rash  Comments: Didn't examine sacral area   Neurological:      Mental Status: He is alert and oriented to person, place, and time  Cranial Nerves: Cranial nerve deficit (slow processing ) present  Psychiatric:         Behavior: Behavior normal          Pertinent Laboratory/Diagnostic Studies:  6/24: Cbc, CMP    Current Medications   Medication list in facility chart was reviewed and necessary changes made  Assessment and Plan    Ambulatory dysfunction  Improving with therapy, will be DC home to continue with PT/OT at home to improve gait, transfer, ADLs,strength, and endurance  Referral to [facility] was done  Script for all meds written  Pt has DME at home  Wound of left leg  On left knee, stable with current treatment of Betadine  Will need VNA for med management and care  Oropharyngeal dysphagia  Tolerating Nectar thick liquid, diet, no issues reported  Diabetes mellitus type 2 with neurological manifestations [facility])    Lab Results   Component Value Date    HGBA1C 6 4 (H) 04/12/2022   on Metformin, will need f/u with his PCP  HTN (hypertension)  BP stable with Metoprolol  Labs last done on 6/24 and stable  Chronic diastolic CHF (congestive heart failure) (HCC)  On Lasix, wt stable  Will need F/U with his PCP  Atrial fibrillation, chronic  On Xarelto, HR controlled with Metoprolol  Will continue with monitoring  Total time spent was 37 mins with more than 50% of time spent on discharge plan and on discharged arrangement and counseling and coordinating care  Script for all meds written     Lou Ocampo MD  2/4/79206:08 PM

## 2022-07-06 NOTE — ASSESSMENT & PLAN NOTE
Lab Results   Component Value Date    HGBA1C 6 4 (H) 04/12/2022   on Metformin, will need f/u with his PCP

## 2022-07-06 NOTE — ASSESSMENT & PLAN NOTE
Improving with therapy, will be DC home to continue with PT/OT at home to improve gait, transfer, ADLs,strength, and endurance  Referral to [facility] was done  Script for all meds written  Pt has DME at home

## 2022-09-01 NOTE — ASSESSMENT & PLAN NOTE
Dr. Whittaker,    Pt called and stated that her Rt groin wound is still draining light green drainage. Has 2 more days of antibiotics left. Has been afebrile. Would like a referral to wound care.     Please advise    Thanks    Luna   · Rate controlled, continue metoprolol   · Restart Xarelto

## 2022-09-06 ENCOUNTER — TELEPHONE (OUTPATIENT)
Dept: NEUROLOGY | Facility: CLINIC | Age: 72
End: 2022-09-06

## 2022-09-06 NOTE — TELEPHONE ENCOUNTER
Per Chace: schedule for Botox    Called patient and asked if patient would be able to come in tomorrow 9/7 at 12:30 in [facility] for botox appointment  Patient wife stated he is in a nursing home and there is no way that he would be able to get there on such short notice

## 2022-09-13 ENCOUNTER — TELEPHONE (OUTPATIENT)
Dept: NEUROLOGY | Facility: CLINIC | Age: 72
End: 2022-09-13

## 2022-09-13 NOTE — TELEPHONE ENCOUNTER
Called to confirm appt with Wednesday 9/14/22 in [place]  Patient wife informed that patient is in [facility] in [place]  Informed me to call and confirm the appointment with them  Patient wife was not aware of upcoming appointment she states she doesn't believe [facility] is aware of it either  Called [employer] with appointment details and provided main number to call back to confirm patient appointment

## 2022-09-13 NOTE — TELEPHONE ENCOUNTER
Staff from [facility] called she states they didn't have patient appointment in their schedule  Tried to reschedule appointment but Dr Diallo Anton didn't have available till July  She said they dont have their schedule yet that far  She didn't tell me to cancel, not sure if I should

## 2022-09-14 NOTE — TELEPHONE ENCOUNTER
[id number] to verify if patient would be coming to his appointment with Dr Zenia Downing   Left message for them to call back with the [facility] office phone number [phone]

## 2022-09-20 ENCOUNTER — TELEPHONE (OUTPATIENT)
Dept: NEUROLOGY | Facility: CLINIC | Age: 72
End: 2022-09-20

## 2022-09-20 NOTE — TELEPHONE ENCOUNTER
Loraine Madison called to schedule the patient's follow up appointments and his Botox injection appointment  I reached out to sobeida and he advised to schedule the injection with Ricardo Myers on 11-9-22 at 830 am in [place] and offer a follow up appointment with [facility] [place]  I offered the time and date for the Botox appointment and she accepted and I offered 1-6-23 at 80 am in [place] with [place] and she also accepted that one too

## 2022-10-31 ENCOUNTER — TELEPHONE (OUTPATIENT)
Dept: NEUROLOGY | Facility: CLINIC | Age: 72
End: 2022-10-31

## 2022-10-31 NOTE — TELEPHONE ENCOUNTER
Patient to be started with botox injections on 11/9/2022  What is the diagnoses and projected amount of botox to be injected for this patient?

## 2022-10-31 NOTE — TELEPHONE ENCOUNTER
Task was from 4/2022    If he wishes to proceed with Botox injections for drooling ,  I can perform  If patient agreeable forward to Botox team that 100units would be needed for sialorrhea

## 2022-11-01 NOTE — TELEPHONE ENCOUNTER
Botox is not covered by medicare for drooling  Would the patient be able to receive xeomin instead and the same dose?

## 2022-11-01 NOTE — TELEPHONE ENCOUNTER
Xeomin 50 units stock to be transferred to the [facility] office from the [place] office by Sapna Gracia for appointment on 11/9/2022

## 2022-11-09 ENCOUNTER — PROCEDURE VISIT (OUTPATIENT)
Dept: NEUROLOGY | Facility: CLINIC | Age: 72
End: 2022-11-09

## 2022-11-09 VITALS — DIASTOLIC BLOOD PRESSURE: 78 MMHG | TEMPERATURE: 97.8 F | SYSTOLIC BLOOD PRESSURE: 132 MMHG

## 2022-11-09 DIAGNOSIS — G20 ATYPICAL PARKINSONISM (HCC): ICD-10-CM

## 2022-11-09 DIAGNOSIS — K11.7 SIALORRHEA: Primary | ICD-10-CM

## 2022-11-09 RX ORDER — MAGNESIUM HYDROXIDE 2400 MG/30ML
SUSPENSION ORAL DAILY PRN
COMMUNITY

## 2022-11-09 NOTE — PROGRESS NOTES
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[facility] )  Atypical parkinsonism, MSA diagnosed at [facility]  Patient presents with [facility] transporter  No new complaint or changes  He does not think his symptoms are worsen  No medication changes maded today  Continue taking Sinemet 25/100 TID  Continue Rivastigmine patch (unclear patient is using 9 5mg or 13 3mg)  Continue using eye patch as tolerable since it helps with diplopia    Next appointment scheduled on 01/06/2022 with Arsenio Deleon PA-C at 9:30am       Diagnoses and all orders for this visit:    Sialorrhea  -     Chemodenervation    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[facility] )    Other orders  -     Cancel: EMG Rep Stim  -     bisacodyl (FLEET) 10 MG/30ML ENEM; Insert 10 mg into the rectum if needed for constipation  -     Sodium Phosphates (ENEMA RE); Insert into the rectum  -     magnesium hydroxide (Milk of Magnesia) 400 mg/5 mL oral suspension; Take by mouth daily as needed for constipation           Subjective:    Josette Starks is a man with AFib, hypertrophic cardiomyopathy, ERROL and diabetes with diabetic neuropathy is here for follow-up and botulinum toxin for sialorrhea  Last visit with Dr Karlynn Kawasaki was on December 9, 2021  To review per previous note: "symptom onset in 2016/2017 with bl thumb tremor and gait dysfunction  Course has been complicated by diplopia and concern for an atypical parkinsonian syndrome  He has been partially responsive to high doses of levodopa "    Patient follows with [facility] with the diagnosis of MSA  Last Tele visit was on 04/18/2022       Current medications and timing:  Sinemet 25/100, 3 tab TID @ 8-9, 1-2pm, before bed (unclear what time in the evening)  Rivastigmine likely 13 3mg patch (patient does not know)    Today patient is here with Jesu brennon  He feels that his symptoms are stable and they are not worsening  He is a little bit upset that he has not seen his wife for about 3-4 months  He has PT once a week and he has desire to do it more  He ambulates with rolling walker at the facility  He denies any problems with swallowing, appetite, or urinary/bowel incontinence  He reports difficulty falling asleep and his sleep being interrupted a few times at night for vitals  He sometimes cough at night  He does not feel that his drooling is worse than normal  He uses napkins and is not bothered by it  The following portions of the patient's history were reviewed and updated as appropriate: He  has a past medical history of A-fib ([facility] ), Arthritis, Atrial flutter ([facility] ), Balance problems, BPH (benign prostatic hyperplasia), CHF (congestive heart failure) ([facility] ), Chronic pain disorder, Coronary artery disease, Diabetes mellitus ([facility] ), Diabetic neuropathy ([facility] ), Glaucoma, Gout, Hard of hearing, Heart murmur, Hiatal hernia, History of total right knee replacement, History of total shoulder replacement, Hyperlipidemia, Hypertension, Irregular heart beat, Knee pain, left, Knee pain, right, LBBB (left bundle branch block), Obesity, Parkinson disease ([facility] ), Sleep apnea, Use of cane as ambulatory aid, and Wears glasses    He   Patient Active Problem List    Diagnosis Date Noted   • Sialorrhea 11/09/2022   • Wound of left leg 06/24/2022   • Hx of cardiac pacemaker 06/22/2022   • Confusion and disorientation 06/15/2022   • Current severe episode of major depressive disorder without psychotic features without prior episode ([facility] ) 09/14/2021   • 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[facility] ) 09/14/2021   • Ambulatory dysfunction 09/10/2021   • Oropharyngeal dysphagia 09/10/2021   • Other constipation 09/10/2021   • Acute encephalopathy 09/01/2021   • Dementia associated with other underlying disease without behavioral disturbance ([place] [address] ) 08/10/2021   • Diabetes mellitus, type 2 ([place] [address] ) 09/17/2020   • Visual changes 02/13/2020   • Diplopia 02/21/2019   • Obstructive sleep apnea syndrome 02/21/2019   • BPH (benign prostatic hyperplasia) 02/07/2019   • Hyperlipidemia 02/07/2019   • Dyspnea on exertion 11/01/2018   • Fall (on) (from) unspecified stairs and steps, sequela 10/30/2018   • Abnormal gait 10/30/2018   • Aspiration pneumonitis ([place] [address] ) 10/30/2018   • 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[place] [address] ) 10/30/2018   • HTN (hypertension) 10/30/2018   • Diabetes mellitus type 2 with neurological manifestations ([place] [address] ) 10/30/2018   • Atrial fibrillation, chronic 10/30/2018   • Hypertrophic cardiomyopathy ([place] [address] ) 10/30/2018   • Chronic diastolic CHF (congestive heart failure) ([place] [address] ) 10/30/2018   • Failed total right knee replacement ([place] [address] ) 04/21/2017   • CHF (congestive heart failure) ([place] [address] ) 12/29/2016   • Morbid obesity with body mass index (BMI) of 40 0 to 49 9 ([place] [address] ) 08/10/2016   • Vitamin D deficiency 08/10/2016     He  has a past surgical history that includes Colonoscopy; Eye surgery; Foot surgery (Left); Colonoscopy (N/A, 7/15/2016); Hiatal hernia repair; [place] tooth extraction; Cardiac catheterization; Cardiac catheterization; pr revise knee joint replace,1 part (Right, 4/21/2017); IR lumbar puncture (2/17/2020); Joint replacement; Insert / replace / remove pacemaker; Cardiac electrophysiology study and ablation; Cardioversion; Cardiac defibrillator placement; and FACIAL/NECK BIOPSY (Left, 9/17/2020)  His family history includes Cancer in his mother; Heart disease in his father  He  reports that he has never smoked  He has never used smokeless tobacco  He reports current alcohol use  He reports that he does not use drugs    Current Outpatient Medications   Medication Sig Dispense Refill   • acetaminophen (TYLENOL) 650 mg CR tablet Take 650 mg by mouth every 8 (eight) hours as needed for mild pain     • allopurinol (ZYLOPRIM) 300 mg tablet Take 300 mg by mouth daily  • atorvastatin (LIPITOR) 40 mg tablet Take 40 mg by mouth every evening     • Bimatoprost (LUMIGAN OP) Apply 1 drop to eye daily at bedtime Both eyes     • bisacodyl (FLEET) 10 MG/30ML ENEM Insert 10 mg into the rectum if needed for constipation     • brimonidine (ALPHAGAN P) 0 15 % ophthalmic solution Administer 1 drop to the right eye daily 5 mL 0   • Capsaicin-Menthol (Salonpas Pain Rel Gel-Ptch Hot) 0 025-1 25 % PTCH Apply topically as needed     • carbidopa-levodopa (SINEMET)  mg per tablet Take 3 tablets by mouth 3 (three) times a day before meals 810 tablet 3   • Cholecalciferol (Vitamin D3) 25 MCG TABS Take 1 tablet by mouth daily     • cyanocobalamin 1000 MCG tablet Take 1 tablet (1,000 mcg total) by mouth daily 30 tablet 0   • folic acid (FOLVITE) 313 mcg tablet Take 800 mcg by mouth daily     • furosemide (LASIX) 20 mg tablet Take 10 mg by mouth every morning     • latanoprost (XALATAN) 0 005 % ophthalmic solution Administer 1 drop to both eyes daily at bedtime     • magnesium hydroxide (Milk of Magnesia) 400 mg/5 mL oral suspension Take by mouth daily as needed for constipation     • magnesium oxide (MAG-OX) 400 mg Take 200 mg by mouth daily     • Melatonin 3 MG CAPS Take 5 mg by mouth daily at bedtime     • METFORMIN HCL ER, MOD, PO Take 1,000 mg by mouth daily     • metoprolol succinate (TOPROL-XL) 100 mg 24 hr tablet Take 1 tablet (100 mg total) by mouth daily 30 tablet 0   • Multiple Vitamin (MULTIVITAMIN) tablet Take 1 tablet by mouth daily       • potassium chloride (K-DUR,KLOR-CON) 20 mEq tablet Take by mouth daily Dosage unknown to patient     • rivaroxaban (XARELTO) 20 mg tablet Take 1 tablet (20 mg total) by mouth daily with dinner 30 tablet 0   • rivastigmine (EXELON) 9 5 mg/24 hr TD 24 hr patch Place 1 patch on the skin daily     • senna-docusate sodium (SENOKOT-S) 8 6-50 mg per tablet Take 1 tablet by mouth daily at bedtime     • Sodium Phosphates (ENEMA RE) Insert into the rectum     • tamsulosin (FLOMAX) 0 4 mg TAKE 1 CAPSULE TWICE A  capsule 1   • aspirin 81 MG tablet Take 81 mg by mouth daily     • FOLIC ACID PO 0 4 mg daily (Patient not taking: No sig reported)     • glucose blood test strip CHECK BLOOD SUGAR ONCE OR TWICE DAILY  E11 9 Z79 4     • montelukast (SINGULAIR) 10 mg tablet Take 10 mg by mouth daily at bedtime     • polyethylene glycol (MIRALAX) 17 g packet Take 17 g by mouth daily (Patient not taking: No sig reported) 14 each 0     No current facility-administered medications for this visit  Current Outpatient Medications on File Prior to Visit   Medication Sig   • acetaminophen (TYLENOL) 650 mg CR tablet Take 650 mg by mouth every 8 (eight) hours as needed for mild pain   • allopurinol (ZYLOPRIM) 300 mg tablet Take 300 mg by mouth daily     • atorvastatin (LIPITOR) 40 mg tablet Take 40 mg by mouth every evening   • Bimatoprost (LUMIGAN OP) Apply 1 drop to eye daily at bedtime Both eyes   • bisacodyl (FLEET) 10 MG/30ML ENEM Insert 10 mg into the rectum if needed for constipation   • brimonidine (ALPHAGAN P) 0 15 % ophthalmic solution Administer 1 drop to the right eye daily   • Capsaicin-Menthol (Salonpas Pain Rel Gel-Ptch Hot) 0 025-1 25 % PTCH Apply topically as needed   • carbidopa-levodopa (SINEMET)  mg per tablet Take 3 tablets by mouth 3 (three) times a day before meals   • Cholecalciferol (Vitamin D3) 25 MCG TABS Take 1 tablet by mouth daily   • cyanocobalamin 1000 MCG tablet Take 1 tablet (1,000 mcg total) by mouth daily   • folic acid (FOLVITE) 929 mcg tablet Take 800 mcg by mouth daily   • furosemide (LASIX) 20 mg tablet Take 10 mg by mouth every morning   • latanoprost (XALATAN) 0 005 % ophthalmic solution Administer 1 drop to both eyes daily at bedtime   • magnesium hydroxide (Milk of Magnesia) 400 mg/5 mL oral suspension Take by mouth daily as needed for constipation   • magnesium oxide (MAG-OX) 400 mg Take 200 mg by mouth daily   • Melatonin 3 MG CAPS Take 5 mg by mouth daily at bedtime   • METFORMIN HCL ER, MOD, PO Take 1,000 mg by mouth daily   • metoprolol succinate (TOPROL-XL) 100 mg 24 hr tablet Take 1 tablet (100 mg total) by mouth daily   • Multiple Vitamin (MULTIVITAMIN) tablet Take 1 tablet by mouth daily  • potassium chloride (K-DUR,KLOR-CON) 20 mEq tablet Take by mouth daily Dosage unknown to patient   • rivaroxaban (XARELTO) 20 mg tablet Take 1 tablet (20 mg total) by mouth daily with dinner   • rivastigmine (EXELON) 9 5 mg/24 hr TD 24 hr patch Place 1 patch on the skin daily   • senna-docusate sodium (SENOKOT-S) 8 6-50 mg per tablet Take 1 tablet by mouth daily at bedtime   • Sodium Phosphates (ENEMA RE) Insert into the rectum   • tamsulosin (FLOMAX) 0 4 mg TAKE 1 CAPSULE TWICE A DAY   • aspirin 81 MG tablet Take 81 mg by mouth daily   • FOLIC ACID PO 0 4 mg daily (Patient not taking: No sig reported)   • glucose blood test strip CHECK BLOOD SUGAR ONCE OR TWICE DAILY  E11 9 Z79 4   • montelukast (SINGULAIR) 10 mg tablet Take 10 mg by mouth daily at bedtime   • polyethylene glycol (MIRALAX) 17 g packet Take 17 g by mouth daily (Patient not taking: No sig reported)     No current facility-administered medications on file prior to visit  He is allergic to peanut oil - food allergy            Objective:    Blood pressure 132/78, temperature 97 8 °F (36 6 °C)  Physical Exam  Constitutional:       General: He is not in acute distress  HENT:      Head: Normocephalic and atraumatic  Right Ear: Hearing normal       Nose: Nose normal       Mouth/Throat:      Mouth: Mucous membranes are moist       Pharynx: Oropharynx is clear  No oropharyngeal exudate or posterior oropharyngeal erythema  Eyes:      Extraocular Movements: EOM normalNo nystagmus  Pupils: Pupils are equal, round, and reactive to light  Cardiovascular:      Rate and Rhythm: Normal rate  Pulses: Normal pulses     Pulmonary:      Effort: Pulmonary effort is normal  No respiratory distress  Musculoskeletal:      Cervical back: Normal range of motion  Right lower leg: Edema present  Left lower leg: Edema present  Skin:     General: Skin is warm and dry  Neurological:      General: No focal deficit present  Mental Status: He is alert and oriented to person, place, and time  Deep Tendon Reflexes: Strength normal       Reflex Scores:       Tricep reflexes are 2+ on the right side and 2+ on the left side  Bicep reflexes are 2+ on the right side and 2+ on the left side  Brachioradialis reflexes are 2+ on the right side and 2+ on the left side  Patellar reflexes are 0 on the right side and 0 on the left side  Achilles reflexes are 0 on the right side and 0 on the left side  Psychiatric:         Mood and Affect: Mood normal          Thought Content: Thought content normal          Judgment: Judgment normal          Neurological Exam  Mental Status  Alert  Oriented only to person, place and situation  Oriented to person, place, and time  Speech: hypophonia  Language is fluent with no aphasia  Attention and concentration are normal     Cranial Nerves  CN II: Visual fields full to confrontation  Right funduscopic exam: not visualized  Left funduscopic exam: not visualized  CN III, IV, VI: Abnormal extraocular movements: Both eyes cannot adduct fully  No nystagmus  Pupils equal round and reactive to light bilaterally  CN V: Facial sensation is normal   CN VII: Full and symmetric facial movement  CN VIII:  Right: Hearing is normal   Left: Hearing is decreased  CN IX, X: Palate elevates symmetrically  CN XI: Shoulder shrug strength is normal   CN XII: Tongue midline without atrophy or fasciculations  Patient has diplopia due to deviation of eyes  Closing one eye resolves diplopia  Motor  Normal muscle bulk throughout  No fasciculations present  Increased muscle tone  No abnormal involuntary movements   Strength is 5/5 throughout all four extremities  Sensory  Sensation is intact to light touch, pinprick, vibration and proprioception in all four extremities  Reflexes                                            Right                      Left  Brachioradialis                    2+                         2+  Biceps                                 2+                         2+  Triceps                                2+                         2+  Finger flex                           2+                         2+  Hamstring                            2+                         2+  Patellar                                0                         0  Achilles                                0                         0    Coordination  Right: Finger-to-nose normal  Rapid alternating movement abnormality:Left: Finger-to-nose normal  Rapid alternating movement abnormality:  Rapid alternating movements are slow and cannot fully perform them  Gait    Needs to use wheelchair  ROS:    Review of Systems   Constitutional: Negative  Negative for appetite change and fever  HENT: Positive for drooling, trouble swallowing and voice change (Softer)  Negative for hearing loss and tinnitus  Eyes: Negative  Negative for photophobia, pain and visual disturbance  Respiratory: Negative  Negative for shortness of breath  Cardiovascular: Negative  Negative for palpitations  Gastrointestinal: Negative  Negative for nausea and vomiting  Endocrine: Negative  Negative for cold intolerance  Genitourinary: Negative  Negative for dysuria, frequency and urgency  Musculoskeletal: Positive for gait problem  Negative for myalgias and neck pain  Balance Issues     Skin: Negative  Negative for rash  Allergic/Immunologic: Negative  Neurological: Positive for numbness (Fingers)  Negative for dizziness, tremors, seizures, syncope, facial asymmetry, speech difficulty, weakness, light-headedness and headaches  Hematological: Negative  Does not bruise/bleed easily  Psychiatric/Behavioral: Negative  Negative for confusion, hallucinations and sleep disturbance  All other systems reviewed and are negative      I personally reviewed the ROS that was entered by the medical assistant

## 2022-11-09 NOTE — ASSESSMENT & PLAN NOTE
Given Botulinum toxin in parotid glands and submandibular glands bilaterally  Please refer to Procedure note for details

## 2022-11-09 NOTE — PROGRESS NOTES
Universal Protocol   Consent: Written consent obtained  Consent given by: patient        Chemodenervation     Date/Time 11/9/2022 8:12 AM     Performed by  Wanetta Opitz, MD     Authorized by Wanetta Opitz, MD        Pre-procedure details      Prepped With: Alcohol     Anesthesia  (see MAR for exact dosages): Anesthesia method:  None   Procedure details     Position:  Upright   Botox     Brand:  Xeomin    mL's of preservative free sterile saline:  1    Final Concentration per CC:  50 units    Needle gauge: 30G 1/2 inch    Procedures     Botox Procedures: salivary gland disturbance      Last date: 1st injection  Injection Location      Cervical Dystonia / Salivary Gland:  L parotid gland, R parotid gland, R submandibular gland and L submandibular gland      L parotid gland injection amount:  15 unit(s)      R parotid gland injection amount:  15 unit(s)      parotid gland comments:  7 5 units x 2 into each gland      L submandibular gland injection amount:  5 unit(s)      R submandibular gland injection amount:  5 unit(s)   Post-procedure details      Chemodenervation:  Head or face    Facial Nerve Location[de-identified]  Bilateral facial nerve   Comments      Atypical parkinsonism with moderate , bothersome drooling       Total injected:  40 units  Discarded : 10 units

## 2022-11-09 NOTE — ASSESSMENT & PLAN NOTE
Atypical parkinsonism, MSA diagnosed at [facility]  Patient presents with [facility] transporter  No new complaint or changes  He does not think his symptoms are worsen  No medication changes maded today     Continue taking Sinemet 25/100 TID  Continue Rivastigmine patch (unclear patient is using 9 5mg or 13 3mg)  Continue using eye patch as tolerable since it helps with diplopia    Next appointment scheduled on 01/06/2022 with Arsenio Deleon PA-C at 9:30am

## 2023-01-06 ENCOUNTER — TELEPHONE (OUTPATIENT)
Dept: NEUROLOGY | Facility: CLINIC | Age: 73
End: 2023-01-06

## 2023-01-06 ENCOUNTER — OFFICE VISIT (OUTPATIENT)
Dept: NEUROLOGY | Facility: CLINIC | Age: 73
End: 2023-01-06

## 2023-01-06 VITALS
DIASTOLIC BLOOD PRESSURE: 58 MMHG | HEART RATE: 70 BPM | RESPIRATION RATE: 16 BRPM | TEMPERATURE: 97.1 F | OXYGEN SATURATION: 96 % | WEIGHT: 266.7 LBS | HEIGHT: 78 IN | SYSTOLIC BLOOD PRESSURE: 117 MMHG | BODY MASS INDEX: 30.86 KG/M2

## 2023-01-06 DIAGNOSIS — G23.2 MULTIPLE SYSTEM ATROPHY WITH PREDOMINANT PARKINSONISM (HCC): Primary | ICD-10-CM

## 2023-01-06 DIAGNOSIS — F02.80 DEMENTIA ASSOCIATED WITH OTHER UNDERLYING DISEASE WITHOUT BEHAVIORAL DISTURBANCE (HCC): ICD-10-CM

## 2023-01-06 RX ORDER — CHLORHEXIDINE GLUCONATE 0.12 MG/ML
RINSE ORAL
COMMUNITY

## 2023-01-06 RX ORDER — BRIMONIDINE TARTRATE 0.1 %
DROPS OPHTHALMIC (EYE)
COMMUNITY
Start: 2022-12-14

## 2023-01-06 RX ORDER — SERTRALINE HYDROCHLORIDE 25 MG/1
TABLET, FILM COATED ORAL
COMMUNITY

## 2023-01-06 NOTE — TELEPHONE ENCOUNTER
Called pt and spoke to FABIENNE in regards to scheduling his Xeomin injection  FABIENNE stated he is currently in [facility] in [facility] and asked if I could call them to schedule the appt

## 2023-01-06 NOTE — TELEPHONE ENCOUNTER
Rosi Chester in regards to scheduling his Xeomin injection but was unable to reach them as the office hours stated on phone was 7AM-3PM

## 2023-01-06 NOTE — PROGRESS NOTES
Review of Systems   Constitutional: Negative  Negative for appetite change and fever  HENT: Negative  Negative for hearing loss, tinnitus, trouble swallowing and voice change  Eyes: Negative  Negative for photophobia, pain and visual disturbance  Respiratory: Negative  Negative for shortness of breath  Cardiovascular: Negative  Negative for palpitations  Gastrointestinal: Negative  Negative for nausea and vomiting  Endocrine: Negative  Negative for cold intolerance  Genitourinary: Negative  Negative for dysuria, frequency and urgency  Musculoskeletal: Negative  Negative for gait problem, myalgias and neck pain  Skin: Negative  Negative for rash  Allergic/Immunologic: Negative  Neurological: Negative  Negative for dizziness, tremors, seizures, syncope, facial asymmetry, speech difficulty, weakness, light-headedness, numbness and headaches  Hematological: Negative  Does not bruise/bleed easily  Psychiatric/Behavioral: Positive for sleep disturbance  Negative for confusion and hallucinations

## 2023-01-06 NOTE — TELEPHONE ENCOUNTER
Lisa Duke called to to make f/u appt from today visit  Instructions were for 1 month  First available is 3/3/23 @ 9:30  Scheduled Pt on that date  No sooner appts on Joao's schedule

## 2023-01-06 NOTE — PATIENT INSTRUCTIONS
Patient with a history of atypical parkinsonism, possible MSA  He has never had a robust response to levodopa, but has noted partial improvement of symptoms so he has remained Sinemet 3tabs tid  No clear on or off throughout the day however his wife does not improvement after taking a dose of Sinemet  At this time will remain on current dose of Sinemet  He also remain on rivastigmine 9 5mg patches for his memory  It appears he had tried increasing this dose in the past with worsening confusion  Will not make any further change to the dose for now  He also had neurotoxin injections 11/9/22 for drooling  Per the staff and patient there was some benefit  Staff noted that his shirts were not as wet as they had been  He would be interested in getting another set of injections and will try and scheduled him for 3 months from his last appt (2/9/23)  He was encouraged to remain active  He is starting PT  He requires max assist for walking  He is also getting swallow therapy and is on a restricted diet with soft foods  For now he will take Sinemet 3tabs tid (9am, 1pm, 5pm) and rivastigmine 9 5mg patches daily     Will schedule for repeat neurotoxin injections for drooling

## 2023-01-06 NOTE — PROGRESS NOTES
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  He has never had a robust response to levodopa, but has noted partial improvement of symptoms so he has remained Sinemet 3tabs tid  No clear on or off throughout the day however his wife does not improvement after taking a dose of Sinemet  At this time will remain on current dose of Sinemet  He also remain on rivastigmine 9 5mg patches for his memory  It appears he had tried increasing this dose in the past with worsening confusion  Will not make any further change to the dose for now  He also had neurotoxin injections 11/9/22 for drooling  Per the staff and patient there was some benefit  Staff noted that his shirts were not as wet as they had been  He would be interested in getting another set of injections and will try and scheduled him for 3 months from his last appt (2/9/23)  He was encouraged to remain active  He is starting PT  He requires max assist for walking  He is also getting swallow therapy and is on a restricted diet with soft foods  He continues to have diplopia  No longer using the eye patch given he felt it was easier to just close an eye when needed  For now he will take Sinemet 3tabs tid (9am, 1pm, 5pm) and rivastigmine 9 5mg patches daily  Will schedule for repeat neurotoxin injections for drooling         Subjective:    Josette Starks is a man with AFib, hypertrophic cardiomyopathy, ERROL and diabetes with diabetic neuropathy is here for follow of atypical parkinsonism  To review, symptom onset in 2016/2017 with bl thumb tremor and gait dysfunction  Course has been complicated by diplopia and concern for an atypical parkinsonian syndrome  He has been partially responsive to high doses of levodopa  Patient also follows with [address] [place] [address] Neurology with the diagnosis of MSA  On 7/27/22 he was in the hospital for worsening functional status at home and he was d/c to [facility] for rehab  During that hospitalization he was also started on Seroquel for insomnia and SLP recommended level 6 soft and bite sized with L2 nectar thick liquids; no straws; single sip only; direct supervision  At his last visit 11/15/22 he underwent neurotoxin injections for drooling  No changes were made to his medications  INTERVAL HISTORY:  Patient presents with [facility] transporter   Last seen by [facility] 4/18/22 - no change in Sinemet, increased rivastigmine to 13 3mg patch   He states that the nurses have told him that his shirts are not as wet since having the neurotoxin injections   He is still bothered by the drooling at times however not as much as in the past   He has assistance with showering and dressing   He can feed himself, foods are soft and smaller bites   He states that he gets swallow therapy   No recent falls   He walks with PT with a walker and a gait belt, he does not walk in his own   He does not notice any clear on with the medication however he states that his wife notes that he is better with the medication   He feels that his memory is still "okay"  He is not aware of any hallucinations however he states that his wife has told him he has them at times   He continues to have dipolia at times, he had a patch however he began to feel that it was difficult to use so now he will just close one eye     Current medications and timing:  Sinemet 25/100, 3 tab TID @ 9, 1pm, 5pm  Rivastigmine 9 5mg patch - dose was never increased per the facility however per review of notes he had increased confusion on higher dosing at home in the past       I personally reviewed and updated the ROS  Total time spent today was 45 minutes  Greater than 50% of total time was spent with the patient and / or family counseling and / or coordinating plan of care           Objective:    Blood pressure 117/58, pulse 70, temperature (!) 97 1 °F (36 2 °C), temperature source Tympanic, resp  rate 16, height 6' 7" (2 007 m), weight 121 kg (266 lb 11 2 oz), SpO2 96 %  Physical Exam  HENT:      Right Ear: Hearing normal       Left Ear: Hearing normal    Eyes:      General: Lids are normal       Pupils: Pupils are equal, round, and reactive to light  Pulmonary:      Effort: Pulmonary effort is normal    Neurological:      Mental Status: He is alert  Motor: Motor strength is normal          Neurological Exam  Mental Status  Alert  Sitting in wheel chair   Able to answer simple questions   He had confusion in regards to his medications, he got upset that he was no longer able to drive and have a job   Cranial Nerves  CN III, IV, VI: Normal lids and orbits bilaterally  Pupils equal round and reactive to light bilaterally  CN V:  Right: Facial sensation is normal   Left: Facial sensation is normal on the left  CN VII:  Right: There is no facial weakness  Left: There is no facial weakness  CN VIII:  Right: Hearing is normal   Left: Hearing is normal   CN IX, X: Palate elevates symmetrically  Normal gag reflex  CN XII: Tongue midline without atrophy or fasciculations  Decreased arm shrug on the left   Dysconjugate gaze   Motor   Strength is 5/5 throughout all four extremities  Sensory  Light touch is normal in upper and lower extremities       Coordination  Right: Finger-to-nose abnormality:Left: Finger-to-nose abnormality:    Gait    Did not formally ambulate in the office today, he is a max assist       UPDRS motor:                              Date:  1/6/23 8/10/21   Speech   2 2   Facial Expression   decreased blinking  2   Rigidity - Neck    0   Rigidity - Upper Extremity (Right)   2 2   Rigidity - Upper Extremity (Left)    2 2   Rigidity - Lower Extremity (Right)   1 1   Rigidity - Lower Extremity (Left)    1 1   Finger Taps (Right)    2 2   Finger Taps (Left)    2 2   Hand Movement (Right)   2 1   Hand Movement (Left)    1 1   Pronation/Supination (Right)   2 2   Pronation/Supination (Left)    1 1   Toe Tapping (Right)  2 2   Toe Tapping (Left)  2 2   Leg Agility (Right)   2 1   Leg Agility (Left)    2 1   Arising from Chair     3   Gait     3   Freezing of Gait   0   Postural Stability         Posture   2   Global spontaneity of movement  2 2   Postural Tremor (Right)  0 0   Postural Tremor (Left)  0 0   Kinetic Tremor (Right)   0 0   Kinetic Tremor (Left)   0 0   Rest tremor amplitude RUE  0 0   Rest tremor amplitude LUE  0 0   Rest tremor amplitude RLE  0 0   Reset tremor amplitude LLE  0 0   Lip/Jaw Tremor        Consistency of tremor  0 0   Motor Exam Total:            ROS:    Review of Systems   Constitutional: Negative  Negative for appetite change and fever  HENT: Negative  Negative for hearing loss, tinnitus, trouble swallowing and voice change  Eyes: Negative  Negative for photophobia, pain and visual disturbance  Respiratory: Negative  Negative for shortness of breath  Cardiovascular: Negative  Negative for palpitations  Gastrointestinal: Negative  Negative for nausea and vomiting  Endocrine: Negative  Negative for cold intolerance  Genitourinary: Negative  Negative for dysuria, frequency and urgency  Musculoskeletal: Positive for gait problem (walks with assistance) and neck stiffness  Negative for myalgias and neck pain  Skin: Negative  Negative for rash  Allergic/Immunologic: Negative  Neurological: Positive for speech difficulty  Negative for dizziness, tremors, seizures, syncope, facial asymmetry, weakness, light-headedness, numbness and headaches  Hematological: Negative  Does not bruise/bleed easily  Psychiatric/Behavioral: Positive for sleep disturbance  Negative for confusion and hallucinations

## 2023-01-06 NOTE — ASSESSMENT & PLAN NOTE
Patient with a history of atypical parkinsonism, possible MSA  He has never had a robust response to levodopa, but has noted partial improvement of symptoms so he has remained Sinemet 3tabs tid  No clear on or off throughout the day however his wife does not improvement after taking a dose of Sinemet  At this time will remain on current dose of Sinemet  He also remain on rivastigmine 9 5mg patches for his memory  It appears he had tried increasing this dose in the past with worsening confusion  Will not make any further change to the dose for now  He also had neurotoxin injections 11/9/22 for drooling  Per the staff and patient there was some benefit  Staff noted that his shirts were not as wet as they had been  He would be interested in getting another set of injections and will try and scheduled him for 3 months from his last appt (2/9/23)  He was encouraged to remain active  He is starting PT  He requires max assist for walking  He is also getting swallow therapy and is on a restricted diet with soft foods  He continues to have diplopia  No longer using the eye patch given he felt it was easier to just close an eye when needed  For now he will take Sinemet 3tabs tid (9am, 1pm, 5pm) and rivastigmine 9 5mg patches daily     Will schedule for repeat neurotoxin injections for drooling

## 2023-03-28 ENCOUNTER — TELEPHONE (OUTPATIENT)
Dept: NEUROLOGY | Facility: CLINIC | Age: 73
End: 2023-03-28

## 2023-03-28 NOTE — TELEPHONE ENCOUNTER
Bel Castro from [facility] [address] home called and advised the patient past away on 3-27-23

## 2023-06-28 NOTE — ASSESSMENT & PLAN NOTE
Follows with [facility]   S/p Biventricular implantable cardioverter-defibrillator (ICD) in situ 12/17/2019   Appears euvolemic  Continue lasix regimen   Continue to follow with Cardiology as an outpatient  Dutasteride Pregnancy And Lactation Text: This medication is absolutely contraindicated in women, especially during pregnancy and breast feeding. Feminization of male fetuses is possible if taking while pregnant.

## 2024-04-04 NOTE — PLAN OF CARE
Problem: Potential for Falls  Goal: Patient will remain free of falls  Description  INTERVENTIONS:  - Assess patient frequently for physical needs  -  Identify cognitive and physical deficits and behaviors that affect risk of falls    -  [place] fall precautions as indicated by assessment   - Educate patient/family on patient safety including physical limitations  - Instruct patient to call for assistance with activity based on assessment  - Modify environment to reduce risk of injury  - Consider OT/PT consult to assist with strengthening/mobility  Outcome: Progressing     Problem: METABOLIC, FLUID AND ELECTROLYTES - ADULT  Goal: Glucose maintained within target range  Description  INTERVENTIONS:  - Monitor Blood Glucose as ordered  - Assess for signs and symptoms of hyperglycemia and hypoglycemia  - Administer ordered medications to maintain glucose within target range  - Assess nutritional intake and initiate nutrition service referral as needed  Outcome: Progressing     Problem: MUSCULOSKELETAL - ADULT  Goal: Maintain or return mobility to safest level of function  Description  INTERVENTIONS:  - Assess patient's ability to carry out ADLs; assess patient's baseline for ADL function and identify physical deficits which impact ability to perform ADLs (bathing, care of mouth/teeth, toileting, grooming, dressing, etc )  - Assess/evaluate cause of self-care deficits   - Assess range of motion  - Assess patient's mobility  - Assess patient's need for assistive devices and provide as appropriate  - Encourage maximum independence but intervene and supervise when necessary  - Involve family in performance of ADLs  - Assess for home care needs following discharge   - Consider OT consult to assist with ADL evaluation and planning for discharge  - Provide patient education as appropriate  Outcome: Progressing     Problem: PAIN - ADULT  Goal: Verbalizes/displays adequate comfort level or baseline comfort Pt want to get stress test results   level  Description  Interventions:  - Encourage patient to monitor pain and request assistance  - Assess pain using appropriate pain scale  - Administer analgesics based on type and severity of pain and evaluate response  - Implement non-pharmacological measures as appropriate and evaluate response  - Consider cultural and social influences on pain and pain management  - Notify physician/advanced practitioner if interventions unsuccessful or patient reports new pain  Outcome: Progressing     Problem: DISCHARGE PLANNING  Goal: Discharge to home or other facility with appropriate resources  Description  INTERVENTIONS:  - Identify barriers to discharge w/patient and caregiver  - Arrange for needed discharge resources and transportation as appropriate  - Identify discharge learning needs (meds, wound care, etc )  - Arrange for interpretive services to assist at discharge as needed  - Refer to Case Management Department for coordinating discharge planning if the patient needs post-hospital services based on physician/advanced practitioner order or complex needs related to functional status, cognitive ability, or social support system  Outcome: Progressing     Problem: Knowledge Deficit  Goal: Patient/family/caregiver demonstrates understanding of disease process, treatment plan, medications, and discharge instructions  Description  Complete learning assessment and assess knowledge base    Interventions:  - Provide teaching at level of understanding  - Provide teaching via preferred learning methods  Outcome: Progressing
